# Patient Record
Sex: FEMALE | Race: WHITE | Employment: OTHER | URBAN - METROPOLITAN AREA
[De-identification: names, ages, dates, MRNs, and addresses within clinical notes are randomized per-mention and may not be internally consistent; named-entity substitution may affect disease eponyms.]

---

## 2017-05-23 ENCOUNTER — OFFICE VISIT (OUTPATIENT)
Dept: ORTHOPEDIC SURGERY | Age: 77
End: 2017-05-23

## 2017-05-23 VITALS
BODY MASS INDEX: 26.4 KG/M2 | HEIGHT: 63 IN | WEIGHT: 149 LBS | HEART RATE: 60 BPM | SYSTOLIC BLOOD PRESSURE: 95 MMHG | DIASTOLIC BLOOD PRESSURE: 53 MMHG

## 2017-05-23 DIAGNOSIS — S52.532A CLOSED COLLES' FRACTURE OF LEFT RADIUS, INITIAL ENCOUNTER: Primary | ICD-10-CM

## 2017-05-23 PROCEDURE — 25600 CLTX DST RDL FX/EPHYS SEP WO: CPT | Performed by: ORTHOPAEDIC SURGERY

## 2017-05-23 PROCEDURE — 99203 OFFICE O/P NEW LOW 30 MIN: CPT | Performed by: ORTHOPAEDIC SURGERY

## 2017-05-23 PROCEDURE — L3908 WHO COCK-UP NONMOLDE PRE OTS: HCPCS | Performed by: ORTHOPAEDIC SURGERY

## 2017-07-06 ENCOUNTER — OFFICE VISIT (OUTPATIENT)
Dept: ORTHOPEDIC SURGERY | Age: 77
End: 2017-07-06

## 2017-07-06 VITALS — BODY MASS INDEX: 26.4 KG/M2 | HEIGHT: 63 IN | WEIGHT: 149 LBS | RESPIRATION RATE: 16 BRPM

## 2017-07-06 DIAGNOSIS — S52.532A CLOSED COLLES' FRACTURE OF LEFT RADIUS, INITIAL ENCOUNTER: Primary | ICD-10-CM

## 2017-07-06 PROCEDURE — 99024 POSTOP FOLLOW-UP VISIT: CPT | Performed by: NURSE PRACTITIONER

## 2017-07-06 PROCEDURE — 73110 X-RAY EXAM OF WRIST: CPT | Performed by: NURSE PRACTITIONER

## 2017-09-07 ENCOUNTER — OFFICE VISIT (OUTPATIENT)
Dept: ORTHOPEDIC SURGERY | Age: 77
End: 2017-09-07

## 2017-09-07 VITALS
SYSTOLIC BLOOD PRESSURE: 147 MMHG | WEIGHT: 149 LBS | HEART RATE: 66 BPM | HEIGHT: 63 IN | DIASTOLIC BLOOD PRESSURE: 85 MMHG | BODY MASS INDEX: 26.4 KG/M2 | RESPIRATION RATE: 15 BRPM

## 2017-09-07 DIAGNOSIS — S52.532A CLOSED COLLES' FRACTURE OF LEFT RADIUS, INITIAL ENCOUNTER: Primary | ICD-10-CM

## 2017-09-07 PROCEDURE — 99213 OFFICE O/P EST LOW 20 MIN: CPT | Performed by: ORTHOPAEDIC SURGERY

## 2017-09-07 PROCEDURE — 73110 X-RAY EXAM OF WRIST: CPT | Performed by: ORTHOPAEDIC SURGERY

## 2017-11-01 ENCOUNTER — OFFICE VISIT (OUTPATIENT)
Dept: FAMILY MEDICINE CLINIC | Age: 77
End: 2017-11-01

## 2017-11-01 VITALS
SYSTOLIC BLOOD PRESSURE: 124 MMHG | RESPIRATION RATE: 16 BRPM | BODY MASS INDEX: 26.57 KG/M2 | HEIGHT: 62 IN | TEMPERATURE: 97.5 F | HEART RATE: 68 BPM | WEIGHT: 144.4 LBS | DIASTOLIC BLOOD PRESSURE: 66 MMHG

## 2017-11-01 DIAGNOSIS — M83.1 SENILE OSTEOMALACIA: ICD-10-CM

## 2017-11-01 DIAGNOSIS — E78.2 MIXED HYPERLIPIDEMIA: ICD-10-CM

## 2017-11-01 DIAGNOSIS — G47.00 INSOMNIA, UNSPECIFIED TYPE: Primary | ICD-10-CM

## 2017-11-01 DIAGNOSIS — G25.81 RESTLESS LEG SYNDROME: ICD-10-CM

## 2017-11-01 PROBLEM — M85.80 OSTEOPENIA: Status: ACTIVE | Noted: 2017-06-22

## 2017-11-01 PROBLEM — M48.061 SPINAL STENOSIS OF LUMBAR REGION: Status: ACTIVE | Noted: 2017-06-22

## 2017-11-01 PROBLEM — R20.8 BURNING SENSATION OF MOUTH: Status: ACTIVE | Noted: 2017-06-22

## 2017-11-01 PROCEDURE — 99202 OFFICE O/P NEW SF 15 MIN: CPT | Performed by: FAMILY MEDICINE

## 2017-11-01 PROCEDURE — 1090F PRES/ABSN URINE INCON ASSESS: CPT | Performed by: FAMILY MEDICINE

## 2017-11-01 PROCEDURE — G8427 DOCREV CUR MEDS BY ELIG CLIN: HCPCS | Performed by: FAMILY MEDICINE

## 2017-11-01 PROCEDURE — G8417 CALC BMI ABV UP PARAM F/U: HCPCS | Performed by: FAMILY MEDICINE

## 2017-11-01 PROCEDURE — 1036F TOBACCO NON-USER: CPT | Performed by: FAMILY MEDICINE

## 2017-11-01 PROCEDURE — 1123F ACP DISCUSS/DSCN MKR DOCD: CPT | Performed by: FAMILY MEDICINE

## 2017-11-01 PROCEDURE — G8484 FLU IMMUNIZE NO ADMIN: HCPCS | Performed by: FAMILY MEDICINE

## 2017-11-01 PROCEDURE — 4040F PNEUMOC VAC/ADMIN/RCVD: CPT | Performed by: FAMILY MEDICINE

## 2017-11-01 PROCEDURE — G8400 PT W/DXA NO RESULTS DOC: HCPCS | Performed by: FAMILY MEDICINE

## 2017-11-01 RX ORDER — PRAVASTATIN SODIUM 40 MG
40 TABLET ORAL NIGHTLY
COMMUNITY
Start: 2017-03-02 | End: 2018-07-18 | Stop reason: SDUPTHER

## 2017-11-01 RX ORDER — HYDROCODONE BITARTRATE AND ACETAMINOPHEN 5; 325 MG/1; MG/1
1 TABLET ORAL DAILY PRN
COMMUNITY
Start: 2017-10-03 | End: 2018-05-04 | Stop reason: ALTCHOICE

## 2017-11-01 RX ORDER — OMEGA-3/DHA/EPA/FISH OIL 60 MG-90MG
500 CAPSULE ORAL EVERY MORNING
Status: ON HOLD | COMMUNITY
End: 2019-04-02 | Stop reason: HOSPADM

## 2017-11-01 RX ORDER — CLONAZEPAM 1 MG/1
.5-1 TABLET ORAL NIGHTLY
Qty: 30 TABLET | Refills: 2 | Status: SHIPPED | OUTPATIENT
Start: 2017-11-01 | End: 2018-06-01 | Stop reason: SDUPTHER

## 2017-11-01 RX ORDER — CELECOXIB 200 MG/1
200 CAPSULE ORAL DAILY
COMMUNITY
Start: 2017-08-22 | End: 2018-03-28 | Stop reason: SDUPTHER

## 2017-11-01 RX ORDER — ASCORBIC ACID 500 MG
500 TABLET ORAL DAILY
COMMUNITY

## 2017-11-01 RX ORDER — LANOLIN ALCOHOL/MO/W.PET/CERES
1000 CREAM (GRAM) TOPICAL DAILY
COMMUNITY

## 2017-11-01 RX ORDER — CLONAZEPAM 1 MG/1
.5-1 TABLET ORAL NIGHTLY
COMMUNITY
Start: 2017-10-03 | End: 2017-11-01 | Stop reason: SDUPTHER

## 2017-11-01 ASSESSMENT — ENCOUNTER SYMPTOMS
COUGH: 0
VOMITING: 0
SHORTNESS OF BREATH: 0
ABDOMINAL PAIN: 0
NAUSEA: 0
CONSTIPATION: 0
DIARRHEA: 0

## 2017-11-01 NOTE — PROGRESS NOTES
Father 47     CHOKED AND FELL    Stroke Sister 80     BOTH SISTERS    Other Brother      BOTH  FROM ACCIDENTS     Current Outpatient Prescriptions   Medication Sig Dispense Refill    aspirin 81 MG tablet Take 1 tablet by mouth nightly      celecoxib (CELEBREX) 200 MG capsule Take 200 mg by mouth daily      HYDROcodone-acetaminophen (NORCO) 5-325 MG per tablet Take 1 tablet by mouth daily as needed .  Omega-3 Fatty Acids (FISH OIL) 500 MG CAPS Take 500 mg by mouth every morning      pravastatin (PRAVACHOL) 40 MG tablet Take 40 mg by mouth nightly      vitamin B-12 (CYANOCOBALAMIN) 1000 MCG tablet Take 1,000 mcg by mouth daily      vitamin C (ASCORBIC ACID) 500 MG tablet Take 500 mg by mouth daily      vitamin D (CHOLECALCIFEROL) 1000 UNIT TABS tablet Take 1,000 Units by mouth daily      clonazePAM (KLONOPIN) 1 MG tablet Take 0.5-1 tablets by mouth nightly 30 tablet 2     No current facility-administered medications for this visit. Allergies   Allergen Reactions    Naproxen Other (See Comments)     GI UPSET       Review of Systems   Constitutional: Positive for unexpected weight change. Negative for chills and fever. Respiratory: Negative for cough and shortness of breath. Cardiovascular: Negative for chest pain and palpitations. Gastrointestinal: Negative for abdominal pain, constipation, diarrhea, nausea and vomiting. Genitourinary: Negative for difficulty urinating. Musculoskeletal: Positive for arthralgias and myalgias. Neurological: Negative for dizziness, syncope and light-headedness. Psychiatric/Behavioral: Positive for sleep disturbance. Objective:  /66 (Site: Right Arm, Position: Sitting, Cuff Size: Medium Adult)   Pulse 68   Temp 97.5 °F (36.4 °C) (Oral)   Resp 16   Ht 5' 2.25\" (1.581 m) Comment: NO SHOES  Wt 144 lb 6.4 oz (65.5 kg) Comment: NO SHOES  Breastfeeding?  No   BMI 26.20 kg/m²     Physical Exam   Constitutional: She is oriented to person,

## 2017-11-01 NOTE — PATIENT INSTRUCTIONS
Patient Education        Restless Legs Syndrome: Care Instructions  Your Care Instructions  Restless legs syndrome is a common nervous system problem. People with this syndrome feel a creeping, achy, or unpleasant feeling in the legs and an overpowering urge to move them. It often occurs in the evening and at night and can lead to sleep problems and tiredness. Your doctor may suggest doing a study of your sleep patterns to figure out what is happening when you try to sleep. Many people get relief from symptoms when they get regular exercise, eat well, and avoid caffeine, alcohol, and tobacco.  Follow-up care is a key part of your treatment and safety. Be sure to make and go to all appointments, and call your doctor if you are having problems. It's also a good idea to know your test results and keep a list of the medicines you take. How can you care for yourself at home? · Take your medicines exactly as prescribed. Call your doctor if you think you are having a problem with your medicine. · Try bathing in hot or cold water. Applying a heating pad or ice bag to your legs may also help symptoms. · Stretch and massage your legs before bed or when discomfort begins. · Get some exercise for at least 30 minutes a day on most days of the week. Stop exercising at least 3 hours before bedtime. · Try to plan for situations where you will need to remain seated for long stretches. For example, if you are traveling by car, plan some stops so you can get out and walk around. · Tell your doctor about any medicines you are taking. This includes all over-the-counter, prescription, and herbal medicines. Some medicines, such as antidepressants, antihistamines, and cold and sinus medicines, can make your symptoms worse. · Avoid caffeine products, such as coffee, tea, cola, and chocolate. Caffeine can interrupt your sleep and stimulate you. · Do not smoke. Nicotine can make restless legs worse.  If you need help quitting, talk to your doctor about stop-smoking programs and medicines. These can increase your chances of quitting for good. · Do not drink alcohol late in the evening. Take steps to help you sleep better  · Get plenty of sunlight in the outdoors, particularly later in the afternoon. · Use the evening hours for settling down. Avoid activities that challenge you in the hours before bedtime. · Eat meals at regular times, and do not snack before bedtime. · Keep your bedroom quiet, dark, and cool. Try using a sleep mask and earplugs to help you sleep. · Limit how much you drink at night to reduce your need to get up to urinate. But do not go to bed thirsty. · Run a fan or other steady \"white noise\" during the night if noises wake you up. · [place] the bed for sleeping and sex. Do your reading or TV watching in another room. · Once you are in bed, relax from head to toe, and guide your mind to pleasant thoughts. · Do not stay in bed longer than 8 hours, and try to avoid naps. · If your symptoms usually improve around 4 a.m. to 6 a.m., try going to bed later than usual or allowing extra time for sleeping in to help you get the rest you need. When should you call for help? Watch closely for changes in your health, and be sure to contact your doctor if:  · You are still not getting enough sleep. · Your symptoms become more severe or happen more often. Where can you learn more? Go to https://ControluspejazzyUsarium.Vmedia Research. org and sign in to your [employer] account. Enter X037 in the [facility] box to learn more about \"Restless Legs Syndrome: Care Instructions. \"     If you do not have an account, please click on the \"Sign Up Now\" link. Current as of: October 14, 2016  Content Version: 11.3  © 4068-7637 [employer]. Care instructions adapted under license by [facility] ([facility]).  If you have questions about a medical condition or this instruction, always ask your healthcare professional. [employer] disclaims any warranty or liability for your use of this information.

## 2017-11-10 ENCOUNTER — NURSE ONLY (OUTPATIENT)
Dept: FAMILY MEDICINE CLINIC | Age: 77
End: 2017-11-10

## 2017-11-10 DIAGNOSIS — E78.2 MIXED HYPERLIPIDEMIA: ICD-10-CM

## 2017-11-10 DIAGNOSIS — G25.81 RESTLESS LEG SYNDROME: ICD-10-CM

## 2017-11-10 DIAGNOSIS — G47.00 INSOMNIA, UNSPECIFIED TYPE: ICD-10-CM

## 2017-11-10 DIAGNOSIS — M83.1 SENILE OSTEOMALACIA: ICD-10-CM

## 2017-11-10 LAB
A/G RATIO: 1.4 (ref 1.1–2.2)
ALBUMIN SERPL-MCNC: 3.9 G/DL (ref 3.4–5)
ALP BLD-CCNC: 56 U/L (ref 40–129)
ALT SERPL-CCNC: 14 U/L (ref 10–40)
ANION GAP SERPL CALCULATED.3IONS-SCNC: 11 MMOL/L (ref 3–16)
AST SERPL-CCNC: 26 U/L (ref 15–37)
BASOPHILS ABSOLUTE: 0 K/UL (ref 0–0.2)
BASOPHILS RELATIVE PERCENT: 0.4 %
BILIRUB SERPL-MCNC: 0.5 MG/DL (ref 0–1)
BUN BLDV-MCNC: 19 MG/DL (ref 7–20)
CALCIUM SERPL-MCNC: 9.3 MG/DL (ref 8.3–10.6)
CHLORIDE BLD-SCNC: 102 MMOL/L (ref 99–110)
CHOLESTEROL, TOTAL: 182 MG/DL (ref 0–199)
CO2: 31 MMOL/L (ref 21–32)
CREAT SERPL-MCNC: 1 MG/DL (ref 0.6–1.2)
EOSINOPHILS ABSOLUTE: 0.2 K/UL (ref 0–0.6)
EOSINOPHILS RELATIVE PERCENT: 2.4 %
FOLATE: >20 NG/ML (ref 4.78–24.2)
GFR AFRICAN AMERICAN: >60
GFR NON-AFRICAN AMERICAN: 54
GLOBULIN: 2.7 G/DL
GLUCOSE BLD-MCNC: 96 MG/DL (ref 70–99)
HCT VFR BLD CALC: 47 % (ref 36–48)
HDLC SERPL-MCNC: 51 MG/DL (ref 40–60)
HEMOGLOBIN: 15.3 G/DL (ref 12–16)
LDL CHOLESTEROL CALCULATED: 101 MG/DL
LYMPHOCYTES ABSOLUTE: 3 K/UL (ref 1–5.1)
LYMPHOCYTES RELATIVE PERCENT: 41.7 %
MCH RBC QN AUTO: 30.3 PG (ref 26–34)
MCHC RBC AUTO-ENTMCNC: 32.7 G/DL (ref 31–36)
MCV RBC AUTO: 92.7 FL (ref 80–100)
MONOCYTES ABSOLUTE: 0.4 K/UL (ref 0–1.3)
MONOCYTES RELATIVE PERCENT: 6 %
NEUTROPHILS ABSOLUTE: 3.5 K/UL (ref 1.7–7.7)
NEUTROPHILS RELATIVE PERCENT: 49.5 %
PDW BLD-RTO: 14.5 % (ref 12.4–15.4)
PLATELET # BLD: 179 K/UL (ref 135–450)
PMV BLD AUTO: 8.9 FL (ref 5–10.5)
POTASSIUM SERPL-SCNC: 5.1 MMOL/L (ref 3.5–5.1)
RBC # BLD: 5.07 M/UL (ref 4–5.2)
SODIUM BLD-SCNC: 144 MMOL/L (ref 136–145)
TOTAL PROTEIN: 6.6 G/DL (ref 6.4–8.2)
TRIGL SERPL-MCNC: 151 MG/DL (ref 0–150)
TSH REFLEX: 3.19 UIU/ML (ref 0.27–4.2)
VITAMIN B-12: >2000 PG/ML (ref 211–911)
VITAMIN D 25-HYDROXY: 44.2 NG/ML
VLDLC SERPL CALC-MCNC: 30 MG/DL
WBC # BLD: 7.1 K/UL (ref 4–11)

## 2017-11-10 PROCEDURE — 36415 COLL VENOUS BLD VENIPUNCTURE: CPT | Performed by: FAMILY MEDICINE

## 2017-11-29 ENCOUNTER — OFFICE VISIT (OUTPATIENT)
Dept: FAMILY MEDICINE CLINIC | Age: 77
End: 2017-11-29

## 2017-11-29 VITALS
BODY MASS INDEX: 27.09 KG/M2 | DIASTOLIC BLOOD PRESSURE: 68 MMHG | HEART RATE: 70 BPM | HEIGHT: 62 IN | SYSTOLIC BLOOD PRESSURE: 118 MMHG | WEIGHT: 147.2 LBS | RESPIRATION RATE: 16 BRPM

## 2017-11-29 DIAGNOSIS — H69.83 EUSTACHIAN TUBE DYSFUNCTION, BILATERAL: ICD-10-CM

## 2017-11-29 DIAGNOSIS — H61.21 IMPACTED CERUMEN OF RIGHT EAR: Primary | ICD-10-CM

## 2017-11-29 PROCEDURE — G8400 PT W/DXA NO RESULTS DOC: HCPCS | Performed by: REGISTERED NURSE

## 2017-11-29 PROCEDURE — G8484 FLU IMMUNIZE NO ADMIN: HCPCS | Performed by: REGISTERED NURSE

## 2017-11-29 PROCEDURE — G8427 DOCREV CUR MEDS BY ELIG CLIN: HCPCS | Performed by: REGISTERED NURSE

## 2017-11-29 PROCEDURE — 1090F PRES/ABSN URINE INCON ASSESS: CPT | Performed by: REGISTERED NURSE

## 2017-11-29 PROCEDURE — 4040F PNEUMOC VAC/ADMIN/RCVD: CPT | Performed by: REGISTERED NURSE

## 2017-11-29 PROCEDURE — 99213 OFFICE O/P EST LOW 20 MIN: CPT | Performed by: REGISTERED NURSE

## 2017-11-29 PROCEDURE — G8417 CALC BMI ABV UP PARAM F/U: HCPCS | Performed by: REGISTERED NURSE

## 2017-11-29 PROCEDURE — 1123F ACP DISCUSS/DSCN MKR DOCD: CPT | Performed by: REGISTERED NURSE

## 2017-11-29 PROCEDURE — 1036F TOBACCO NON-USER: CPT | Performed by: REGISTERED NURSE

## 2017-11-29 PROCEDURE — 69209 REMOVE IMPACTED EAR WAX UNI: CPT | Performed by: REGISTERED NURSE

## 2017-11-29 ASSESSMENT — PATIENT HEALTH QUESTIONNAIRE - PHQ9
SUM OF ALL RESPONSES TO PHQ9 QUESTIONS 1 & 2: 0
SUM OF ALL RESPONSES TO PHQ QUESTIONS 1-9: 0
2. FEELING DOWN, DEPRESSED OR HOPELESS: 0
1. LITTLE INTEREST OR PLEASURE IN DOING THINGS: 0

## 2017-11-29 ASSESSMENT — ENCOUNTER SYMPTOMS
SINUS PAIN: 0
SINUS PRESSURE: 0

## 2017-11-29 NOTE — PATIENT INSTRUCTIONS
Patient Education        Earwax Blockage: Care Instructions  Your Care Instructions    Earwax is a natural substance that protects the ear canal. Normally, earwax drains from the ears and does not cause problems. Sometimes earwax builds up and hardens. Earwax blockage (also called cerumen impaction) can cause some loss of hearing and pain. When wax is tightly packed, you will need to have your doctor remove it. Follow-up care is a key part of your treatment and safety. Be sure to make and go to all appointments, and call your doctor if you are having problems. Its also a good idea to know your test results and keep a list of the medicines you take. How can you care for yourself at home? · Do not try to remove earwax with cotton swabs, fingers, or other objects. This can make the blockage worse and damage the eardrum. · If your doctor recommends that you try to remove earwax at home:  ¨ Soften and loosen the earwax with warm mineral oil. You also can try hydrogen peroxide mixed with an equal amount of room temperature water. Place 2 drops of the fluid, warmed to body temperature, in the ear two times a day for up to 5 days. ¨ Once the wax is loose and soft, all that is usually needed to remove it from the ear canal is a gentle, warm shower. Direct the water into the ear, then tip your head to let the earwax drain out. Dry your ear thoroughly with a hair dryer set on low. Hold the dryer several inches from your ear. ¨ If the warm mineral oil and shower do not work, use an over-the-counter wax softener followed by gentle flushing with an ear syringe each night for a week or two. Make sure the flushing solution is body temperature. Cool or hot fluids in the ear can cause dizziness. When should you call for help? Call your doctor now or seek immediate medical care if:  · Pus or blood drains from your ear. · Your ears are ringing or feel full. · You have a loss of hearing.   Watch closely for changes in your health, and be sure to contact your doctor if:  · You have pain or reduced hearing after 1 week of home treatment. · You have any new symptoms, such as nausea or balance problems. Where can you learn more? Go to https://chnancy.CyrusOne. org and sign in to your [employer] account. Enter Z055 in the [facility] box to learn more about \"Earwax Blockage: Care Instructions. \"     If you do not have an account, please click on the \"Sign Up Now\" link. Current as of: March 20, 2017  Content Version: 11.3  © 1758-7389 [employer]. Care instructions adapted under license by [facility] ([facility]). If you have questions about a medical condition or this instruction, always ask your healthcare professional. [address] any warranty or liability for your use of this information. Patient Education        Middle Ear Fluid: Care Instructions  Your Care Instructions    Fluid often builds up inside the ear during a cold or allergies. Usually the fluid drains away, but sometimes a small tube in the ear, called the eustachian tube, stays blocked for months. Symptoms of fluid buildup may include:  · Popping, ringing, or a feeling of fullness or pressure in the ear. · Trouble hearing. · Balance problems and dizziness. In most cases, you can treat yourself at home. Follow-up care is a key part of your treatment and safety. Be sure to make and go to all appointments, and call your doctor if you are having problems. It's also a good idea to know your test results and keep a list of the medicines you take. How can you care for yourself at home? · In most cases, the fluid clears up within a few months without treatment. You may need more tests if the fluid does not clear up after 3 months. · If your doctor prescribed antibiotics, take them as directed. Do not stop taking them just because you feel better. You need to take the full course of antibiotics.   When should you call for help? Call your doctor now or seek immediate medical care if:  · You have symptoms of infection, such as:  ¨ Increased pain, swelling, warmth, or redness. ¨ Pus draining from the area. ¨ A fever. Watch closely for changes in your health, and be sure to contact your doctor if:  · You notice changes in hearing. · You do not get better as expected. Where can you learn more? Go to https://Cell Gate USApepiceweb.[email or web address]. org and sign in to your [email or web address] account. Enter H307 in the [employer] box to learn more about \"Middle Ear Fluid: Care Instructions. \"     If you do not have an account, please click on the \"Sign Up Now\" link. Current as of: July 29, 2016  Content Version: 11.3  © 2353-5988 [employer]ink [employer] Incorporated. Care instructions adapted under license by [facility] ([facility]). If you have questions about a medical condition or this instruction, always ask your healthcare professional. [place] [address] any warranty or liability for your use of this information.

## 2017-11-29 NOTE — PROGRESS NOTES
[facility]  Clinic Note    Date: 11/29/2017                                               Subjective/Objective:     Chief Complaint   Patient presents with    Ear Fullness     LEFT EAR CLOGGED X 2 WKS. USED DROPS AND IT HELPED BREAK UP THE WAX BUT NOW HAVING PROBLEMS AGAIN. HPI Patient presents with left ear clogged and decreased hearing x 2 weeks. Denies fever, chills, sinus congestion, ear drainage or pain. Attempted to treat with ear drops OTC with no relief. No Q tips.  States she has had this issue in the past.         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    Burning sensation of mouth 06/22/2017    Spinal stenosis of lumbar region 06/22/2017    Osteopenia 06/22/2017    Closed Colles' fracture of left radius 05/23/2017    History of nonmelanoma skin cancer 08/03/2016    Mixed hyperlipidemia 10/08/2007       Past Medical History:   Diagnosis Date    Arthritis     Diverticulitis     Hyperlipidemia     Osteoarthritis     Restless legs syndrome     Wrist fracture, left 06/2017    TRIPPED AT SON'S HOUSE       Past Surgical History:   Procedure Laterality Date    CHOLECYSTECTOMY  1996    COLON SURGERY  2007    DIVERTICULITIS, REMOVED DIVERTICULA POCKETS       Nurse Only on 11/10/2017   Component Date Value Ref Range Status    Sodium 11/10/2017 144  136 - 145 mmol/L Final    Potassium 11/10/2017 5.1  3.5 - 5.1 mmol/L Final    Chloride 11/10/2017 102  99 - 110 mmol/L Final    CO2 11/10/2017 31  21 - 32 mmol/L Final    Anion Gap 11/10/2017 11  3 - 16 Final    Glucose 11/10/2017 96  70 - 99 mg/dL Final    BUN 11/10/2017 19  7 - 20 mg/dL Final    CREATININE 11/10/2017 1.0  0.6 - 1.2 mg/dL Final    GFR Non- 11/10/2017 54* >60 Final    Comment: >60 mL/min/1.73m2 EGFR, calc. for ages 25 and older using the  MDRD formula (not corrected for weight), is valid for stable  renal function.  GFR  11/10/2017 >60  >60 Final    Comment: Chronic Kidney Disease: less than 60 ml/min/1.73 sq.m. Kidney Failure: less than 15 ml/min/1.73 sq.m. Results valid for patients 18 years and older.       Calcium 11/10/2017 9.3  8.3 - 10.6 mg/dL Final    Total Protein 11/10/2017 6.6  6.4 - 8.2 g/dL Final    Alb 11/10/2017 3.9  3.4 - 5.0 g/dL Final    Albumin/Globulin Ratio 11/10/2017 1.4  1.1 - 2.2 Final    Total Bilirubin 11/10/2017 0.5  0.0 - 1.0 mg/dL Final    Alkaline Phosphatase 11/10/2017 56  40 - 129 U/L Final    ALT 11/10/2017 14  10 - 40 U/L Final    AST 11/10/2017 26  15 - 37 U/L Final    Globulin 11/10/2017 2.7  g/dL Final    Cholesterol, Total 11/10/2017 182  0 - 199 mg/dL Final    Triglycerides 11/10/2017 151* 0 - 150 mg/dL Final    HDL 11/10/2017 51  40 - 60 mg/dL Final    LDL Calculated 11/10/2017 101* <100 mg/dL Final    VLDL CHOLESTEROL CALCULATED 11/10/2017 30  Not Established mg/dL Final    TSH 11/10/2017 3.19  0.27 - 4.20 uIU/mL Final    WBC 11/10/2017 7.1  4.0 - 11.0 K/uL Final    RBC 11/10/2017 5.07  4.00 - 5.20 M/uL Final    Hemoglobin 11/10/2017 15.3  12.0 - 16.0 g/dL Final    Hematocrit 11/10/2017 47.0  36.0 - 48.0 % Final    MCV 11/10/2017 92.7  80.0 - 100.0 fL Final    MCH 11/10/2017 30.3  26.0 - 34.0 pg Final    MCHC 11/10/2017 32.7  31.0 - 36.0 g/dL Final    RDW 11/10/2017 14.5  12.4 - 15.4 % Final    Platelets 04/78/1970 179  135 - 450 K/uL Final    MPV 11/10/2017 8.9  5.0 - 10.5 fL Final    Neutrophils % 11/10/2017 49.5  % Final    Lymphocytes % 11/10/2017 41.7  % Final    Monocytes % 11/10/2017 6.0  % Final    Eosinophils % 11/10/2017 2.4  % Final    Basophils % 11/10/2017 0.4  % Final    Neutrophils # 11/10/2017 3.5  1.7 - 7.7 K/uL Final    Lymphocytes # 11/10/2017 3.0  1.0 - 5.1 K/uL Final    Monocytes # 11/10/2017 0.4  0.0 - 1.3 K/uL Final    Eosinophils # 11/10/2017 0.2  0.0 - 0.6 K/uL Final    Basophils #

## 2017-12-27 ENCOUNTER — OFFICE VISIT (OUTPATIENT)
Dept: FAMILY MEDICINE CLINIC | Age: 77
End: 2017-12-27

## 2017-12-27 ENCOUNTER — HOSPITAL ENCOUNTER (OUTPATIENT)
Dept: OTHER | Age: 77
Discharge: HOME | End: 2017-12-27
Attending: NURSE PRACTITIONER | Admitting: NURSE PRACTITIONER

## 2017-12-27 VITALS
HEIGHT: 62 IN | WEIGHT: 146.6 LBS | TEMPERATURE: 98.5 F | OXYGEN SATURATION: 98 % | DIASTOLIC BLOOD PRESSURE: 70 MMHG | HEART RATE: 62 BPM | RESPIRATION RATE: 16 BRPM | BODY MASS INDEX: 26.98 KG/M2 | SYSTOLIC BLOOD PRESSURE: 140 MMHG

## 2017-12-27 DIAGNOSIS — M79.605 LEFT LEG PAIN: Primary | ICD-10-CM

## 2017-12-27 DIAGNOSIS — M79.605 LEFT LEG PAIN: ICD-10-CM

## 2017-12-27 PROCEDURE — G8400 PT W/DXA NO RESULTS DOC: HCPCS | Performed by: NURSE PRACTITIONER

## 2017-12-27 PROCEDURE — 1123F ACP DISCUSS/DSCN MKR DOCD: CPT | Performed by: NURSE PRACTITIONER

## 2017-12-27 PROCEDURE — 1090F PRES/ABSN URINE INCON ASSESS: CPT | Performed by: NURSE PRACTITIONER

## 2017-12-27 PROCEDURE — 99213 OFFICE O/P EST LOW 20 MIN: CPT | Performed by: NURSE PRACTITIONER

## 2017-12-27 PROCEDURE — G8484 FLU IMMUNIZE NO ADMIN: HCPCS | Performed by: NURSE PRACTITIONER

## 2017-12-27 PROCEDURE — 1036F TOBACCO NON-USER: CPT | Performed by: NURSE PRACTITIONER

## 2017-12-27 PROCEDURE — G8427 DOCREV CUR MEDS BY ELIG CLIN: HCPCS | Performed by: NURSE PRACTITIONER

## 2017-12-27 PROCEDURE — G8417 CALC BMI ABV UP PARAM F/U: HCPCS | Performed by: NURSE PRACTITIONER

## 2017-12-27 PROCEDURE — 4040F PNEUMOC VAC/ADMIN/RCVD: CPT | Performed by: NURSE PRACTITIONER

## 2017-12-27 RX ORDER — PREDNISONE 10 MG/1
TABLET ORAL
Qty: 20 TABLET | Refills: 0 | Status: SHIPPED | OUTPATIENT
Start: 2017-12-27 | End: 2018-03-20 | Stop reason: ALTCHOICE

## 2018-01-29 ENCOUNTER — OFFICE VISIT (OUTPATIENT)
Dept: FAMILY MEDICINE CLINIC | Age: 78
End: 2018-01-29

## 2018-01-29 VITALS
HEIGHT: 60 IN | BODY MASS INDEX: 29.09 KG/M2 | RESPIRATION RATE: 16 BRPM | TEMPERATURE: 98.1 F | WEIGHT: 148.2 LBS | OXYGEN SATURATION: 96 % | HEART RATE: 68 BPM | SYSTOLIC BLOOD PRESSURE: 140 MMHG | DIASTOLIC BLOOD PRESSURE: 82 MMHG

## 2018-01-29 DIAGNOSIS — M25.572 ARTHRALGIA OF LEFT ANKLE: Primary | ICD-10-CM

## 2018-01-29 DIAGNOSIS — R20.8 BURNING SENSATION OF MOUTH: ICD-10-CM

## 2018-01-29 DIAGNOSIS — E78.5 HYPERLIPIDEMIA, UNSPECIFIED HYPERLIPIDEMIA TYPE: ICD-10-CM

## 2018-01-29 DIAGNOSIS — N39.0 URINARY TRACT INFECTION WITHOUT HEMATURIA, SITE UNSPECIFIED: ICD-10-CM

## 2018-01-29 DIAGNOSIS — Z91.81 AT HIGH RISK FOR FALLS: ICD-10-CM

## 2018-01-29 DIAGNOSIS — N28.9 RENAL INSUFFICIENCY: ICD-10-CM

## 2018-01-29 LAB
ANION GAP SERPL CALCULATED.3IONS-SCNC: 17 MMOL/L (ref 3–16)
BILIRUBIN, POC: ABNORMAL
BLOOD URINE, POC: ABNORMAL
BUN BLDV-MCNC: 22 MG/DL (ref 7–20)
CALCIUM SERPL-MCNC: 10 MG/DL (ref 8.3–10.6)
CHLORIDE BLD-SCNC: 104 MMOL/L (ref 99–110)
CLARITY, POC: ABNORMAL
CO2: 27 MMOL/L (ref 21–32)
COLOR, POC: ABNORMAL
CREAT SERPL-MCNC: 1 MG/DL (ref 0.6–1.2)
FOLATE: >20 NG/ML (ref 4.78–24.2)
GFR AFRICAN AMERICAN: >60
GFR NON-AFRICAN AMERICAN: 54
GLUCOSE BLD-MCNC: 84 MG/DL (ref 70–99)
GLUCOSE URINE, POC: ABNORMAL
HOMOCYSTEINE: 15 UMOL/L (ref 0–10)
KETONES, POC: ABNORMAL
LEUKOCYTE EST, POC: ABNORMAL
NITRITE, POC: ABNORMAL
PARATHYROID HORMONE INTACT: 32.2 PG/ML (ref 14–72)
PH, POC: 7
PHOSPHORUS: 3.9 MG/DL (ref 2.5–4.9)
POTASSIUM SERPL-SCNC: 5.4 MMOL/L (ref 3.5–5.1)
PROTEIN, POC: ABNORMAL
SODIUM BLD-SCNC: 148 MMOL/L (ref 136–145)
SPECIFIC GRAVITY, POC: 1.02
URIC ACID, SERUM: 5.4 MG/DL (ref 2.6–6)
UROBILINOGEN, POC: ABNORMAL
VITAMIN B-12: >2000 PG/ML (ref 211–911)

## 2018-01-29 PROCEDURE — 99214 OFFICE O/P EST MOD 30 MIN: CPT | Performed by: FAMILY MEDICINE

## 2018-01-29 PROCEDURE — 81002 URINALYSIS NONAUTO W/O SCOPE: CPT | Performed by: FAMILY MEDICINE

## 2018-01-29 PROCEDURE — 36415 COLL VENOUS BLD VENIPUNCTURE: CPT | Performed by: FAMILY MEDICINE

## 2018-01-29 RX ORDER — SULFAMETHOXAZOLE AND TRIMETHOPRIM 800; 160 MG/1; MG/1
1 TABLET ORAL 2 TIMES DAILY
Qty: 14 TABLET | Refills: 0 | Status: SHIPPED | OUTPATIENT
Start: 2018-01-29 | End: 2018-02-05

## 2018-01-29 ASSESSMENT — ENCOUNTER SYMPTOMS
SHORTNESS OF BREATH: 0
VOMITING: 0
ABDOMINAL PAIN: 0
DIARRHEA: 0
COUGH: 0

## 2018-01-29 NOTE — PROGRESS NOTES
Neutrophils % 11/10/2017 49.5  % Final    Lymphocytes % 11/10/2017 41.7  % Final    Monocytes % 11/10/2017 6.0  % Final    Eosinophils % 11/10/2017 2.4  % Final    Basophils % 11/10/2017 0.4  % Final    Neutrophils # 11/10/2017 3.5  1.7 - 7.7 K/uL Final    Lymphocytes # 11/10/2017 3.0  1.0 - 5.1 K/uL Final    Monocytes # 11/10/2017 0.4  0.0 - 1.3 K/uL Final    Eosinophils # 11/10/2017 0.2  0.0 - 0.6 K/uL Final    Basophils # 11/10/2017 0.0  0.0 - 0.2 K/uL Final    Vitamin B-12 11/10/2017 >2000* 211 - 911 pg/mL Final    Folate 11/10/2017 >20.00  4.78 - 24.20 ng/mL Final    Comment: Effective 11-15-16 10:00am EST  Please note reference ranges have  changed for Folate.  Vit D, 25-Hydroxy 11/10/2017 44.2  >=30 ng/mL Final    Comment: <=20 ng/mL. ........... Skylar Sean Deficient  21-29 ng/mL. ......... Skylar Sean Insufficient  >=30 ng/mL. ........ Skylar Sean Sufficient       Family History   Problem Relation Age of Onset    Stroke Mother [de-identified]    Alzheimer's Disease Mother 68    Other Father 47     CHOKED AND FELL    Stroke Sister 80     BOTH SISTERS    Other Brother      BOTH  FROM ACCIDENTS     Current Outpatient Prescriptions   Medication Sig Dispense Refill    sulfamethoxazole-trimethoprim (BACTRIM DS) 800-160 MG per tablet Take 1 tablet by mouth 2 times daily for 7 days 14 tablet 0    aspirin 81 MG tablet Take 1 tablet by mouth nightly      HYDROcodone-acetaminophen (NORCO) 5-325 MG per tablet Take 1 tablet by mouth daily as needed .       Omega-3 Fatty Acids (FISH OIL) 500 MG CAPS Take 500 mg by mouth every morning      pravastatin (PRAVACHOL) 40 MG tablet Take 40 mg by mouth nightly      vitamin B-12 (CYANOCOBALAMIN) 1000 MCG tablet Take 1,000 mcg by mouth daily      vitamin C (ASCORBIC ACID) 500 MG tablet Take 500 mg by mouth daily      vitamin D (CHOLECALCIFEROL) 1000 UNIT TABS tablet Take 1,000 Units by mouth daily      clonazePAM (KLONOPIN) 1 MG tablet Take 0.5-1 tablets by mouth nightly 30 tablet 2    predniSONE Assessment/Plan:   Shaquille Puga was seen today for other. Diagnoses and all orders for this visit:    Arthralgia of left ankle  -     Homocysteine, Serum; Future  -     Vitamin B12 & Folate; Future  -     Uric Acid; Future  -     Basic Metabolic Panel; Future  -     Homocysteine, Serum  -     Vitamin B12 & Folate  -     Uric Acid  -     Basic Metabolic Panel    Urinary tract infection without hematuria, site unspecified  -     POCT Urinalysis no Micro  -     Urine Culture; Future  -     sulfamethoxazole-trimethoprim (BACTRIM DS) 800-160 MG per tablet; Take 1 tablet by mouth 2 times daily for 7 days  -     Urine Culture    Renal insufficiency  -     PTH, Intact; Future  -     Phosphorus; Future  -     PTH, Intact  -     Phosphorus    Burning sensation of mouth  -     Vitamin B12 & Folate; Future  -     Vitamin B12 & Folate    Hyperlipidemia, unspecified hyperlipidemia type   -     Homocysteine, Serum; Future  -     Homocysteine, Serum    At high risk for falls    Treat for UTI  Refer to pain if continues to require norco  Refer to rheum or ortho if leg and ankle pain persist    Return if symptoms worsen or fail to improve. [id number] [address]  1/29/2018  5:18 PM    On the basis of positive falls risk screening, assessment and plan is as follows: medications adjusted- added atbx for suspected UTI.

## 2018-01-30 DIAGNOSIS — N28.9 RENAL INSUFFICIENCY: ICD-10-CM

## 2018-01-30 DIAGNOSIS — E78.2 MIXED HYPERLIPIDEMIA: Primary | ICD-10-CM

## 2018-01-31 LAB — URINE CULTURE, ROUTINE: NORMAL

## 2018-02-13 ENCOUNTER — NURSE ONLY (OUTPATIENT)
Dept: FAMILY MEDICINE CLINIC | Age: 78
End: 2018-02-13

## 2018-02-13 DIAGNOSIS — E78.2 MIXED HYPERLIPIDEMIA: ICD-10-CM

## 2018-02-13 DIAGNOSIS — N28.9 RENAL INSUFFICIENCY: ICD-10-CM

## 2018-02-13 LAB
ANION GAP SERPL CALCULATED.3IONS-SCNC: 15 MMOL/L (ref 3–16)
BUN BLDV-MCNC: 20 MG/DL (ref 7–20)
CALCIUM SERPL-MCNC: 9.5 MG/DL (ref 8.3–10.6)
CHLORIDE BLD-SCNC: 102 MMOL/L (ref 99–110)
CO2: 28 MMOL/L (ref 21–32)
CREAT SERPL-MCNC: 0.8 MG/DL (ref 0.6–1.2)
GFR AFRICAN AMERICAN: >60
GFR NON-AFRICAN AMERICAN: >60
GLUCOSE BLD-MCNC: 115 MG/DL (ref 70–99)
HOMOCYSTEINE: 11 UMOL/L (ref 0–10)
POTASSIUM SERPL-SCNC: 4.4 MMOL/L (ref 3.5–5.1)
SODIUM BLD-SCNC: 145 MMOL/L (ref 136–145)

## 2018-02-13 PROCEDURE — 36415 COLL VENOUS BLD VENIPUNCTURE: CPT | Performed by: FAMILY MEDICINE

## 2018-02-15 ENCOUNTER — TELEPHONE (OUTPATIENT)
Dept: FAMILY MEDICINE CLINIC | Age: 78
End: 2018-02-15

## 2018-02-15 DIAGNOSIS — M19.90 ARTHRITIS: Primary | ICD-10-CM

## 2018-02-19 ENCOUNTER — TELEPHONE (OUTPATIENT)
Dept: INTERNAL MEDICINE CLINIC | Age: 78
End: 2018-02-19

## 2018-02-19 NOTE — TELEPHONE ENCOUNTER
Referral from Dr Benny Perez --called pt made NP appt for 3/20 ---consult requested for arthritits. ---records should be in epic.

## 2018-03-20 ENCOUNTER — OFFICE VISIT (OUTPATIENT)
Dept: RHEUMATOLOGY | Age: 78
End: 2018-03-20

## 2018-03-20 VITALS
SYSTOLIC BLOOD PRESSURE: 134 MMHG | WEIGHT: 149 LBS | HEIGHT: 64 IN | DIASTOLIC BLOOD PRESSURE: 82 MMHG | HEART RATE: 64 BPM | BODY MASS INDEX: 25.44 KG/M2

## 2018-03-20 DIAGNOSIS — M15.9 PRIMARY OSTEOARTHRITIS INVOLVING MULTIPLE JOINTS: Primary | ICD-10-CM

## 2018-03-20 DIAGNOSIS — S52.532D CLOSED COLLES' FRACTURE OF LEFT RADIUS WITH ROUTINE HEALING, SUBSEQUENT ENCOUNTER: ICD-10-CM

## 2018-03-20 PROCEDURE — 1090F PRES/ABSN URINE INCON ASSESS: CPT | Performed by: INTERNAL MEDICINE

## 2018-03-20 PROCEDURE — G8482 FLU IMMUNIZE ORDER/ADMIN: HCPCS | Performed by: INTERNAL MEDICINE

## 2018-03-20 PROCEDURE — 1036F TOBACCO NON-USER: CPT | Performed by: INTERNAL MEDICINE

## 2018-03-20 PROCEDURE — 99204 OFFICE O/P NEW MOD 45 MIN: CPT | Performed by: INTERNAL MEDICINE

## 2018-03-20 PROCEDURE — G8417 CALC BMI ABV UP PARAM F/U: HCPCS | Performed by: INTERNAL MEDICINE

## 2018-03-20 PROCEDURE — G8427 DOCREV CUR MEDS BY ELIG CLIN: HCPCS | Performed by: INTERNAL MEDICINE

## 2018-03-20 PROCEDURE — 1123F ACP DISCUSS/DSCN MKR DOCD: CPT | Performed by: INTERNAL MEDICINE

## 2018-03-20 PROCEDURE — G8400 PT W/DXA NO RESULTS DOC: HCPCS | Performed by: INTERNAL MEDICINE

## 2018-03-20 PROCEDURE — 4040F PNEUMOC VAC/ADMIN/RCVD: CPT | Performed by: INTERNAL MEDICINE

## 2018-03-20 NOTE — PROGRESS NOTES
SUBJECTIVE:    Patient ID: Silverio Negro is a 65 y.o. female. She is sent for consultation because of pain in her left ankle and pain right lateral hip. She was previously seen by another rheumatologist for osteoarthritis. She's currently on Celebrex 200 mg a day with some relief of her discomfort. She denies joint swelling. She denies psoriasis. Review of Systems:  Genitourinary: Menopause in her 45s briefly on replacement therapy  Constitutional symptoms: denies weight loss, fevers night sweats  Eyes: denies dry eyes,  Ears, nose, mouth, throat: denies dry mouth  Cardiovascular: denies pleuritic chest pain  Respiratory: denies cough, denies shortness of breath  Gastrointestinal: denies peptic ulcer disease,   Skin: denies psori  Hematologic: denies blood clots  Musculoskeletal: denies Raynaud's, denies joint swelling  History left Colles' fracture  Prior to Visit Medications    Medication Sig Taking? Authorizing Provider   Multiple Vitamins-Minerals (CENTRUM SILVER PO) Take by mouth Yes Historical Provider, MD   aspirin 81 MG tablet Take 1 tablet by mouth nightly Yes Historical Provider, MD   celecoxib (CELEBREX) 200 MG capsule Take 200 mg by mouth daily Yes Historical Provider, MD   HYDROcodone-acetaminophen (NORCO) 5-325 MG per tablet Take 1 tablet by mouth daily as needed .  Yes Historical Provider, MD   Omega-3 Fatty Acids (FISH OIL) 500 MG CAPS Take 500 mg by mouth every morning Yes Historical Provider, MD   pravastatin (PRAVACHOL) 40 MG tablet Take 40 mg by mouth nightly Yes Historical Provider, MD   vitamin B-12 (CYANOCOBALAMIN) 1000 MCG tablet Take 1,000 mcg by mouth daily Yes Historical Provider, MD   vitamin C (ASCORBIC ACID) 500 MG tablet Take 500 mg by mouth daily Yes Historical Provider, MD   vitamin D (CHOLECALCIFEROL) 1000 UNIT TABS tablet Take 1,000 Units by mouth daily Yes Historical Provider, MD   clonazePAM (KLONOPIN) 1 MG tablet Take 0.5-1 tablets by mouth nightly Yes Wyatt Jacobs

## 2018-03-28 ENCOUNTER — TELEPHONE (OUTPATIENT)
Dept: INTERNAL MEDICINE CLINIC | Age: 78
End: 2018-03-28

## 2018-03-28 RX ORDER — CELECOXIB 200 MG/1
200 CAPSULE ORAL DAILY
Qty: 90 CAPSULE | Refills: 0 | Status: SHIPPED | OUTPATIENT
Start: 2018-03-28 | End: 2018-07-18 | Stop reason: SDUPTHER

## 2018-05-01 ENCOUNTER — OFFICE VISIT (OUTPATIENT)
Dept: RHEUMATOLOGY | Age: 78
End: 2018-05-01

## 2018-05-01 ENCOUNTER — HOSPITAL ENCOUNTER (OUTPATIENT)
Dept: OTHER | Age: 78
Discharge: HOME | End: 2018-05-01
Attending: INTERNAL MEDICINE | Admitting: INTERNAL MEDICINE

## 2018-05-01 VITALS
WEIGHT: 149.38 LBS | HEIGHT: 64 IN | SYSTOLIC BLOOD PRESSURE: 134 MMHG | BODY MASS INDEX: 25.5 KG/M2 | DIASTOLIC BLOOD PRESSURE: 80 MMHG | HEART RATE: 72 BPM

## 2018-05-01 DIAGNOSIS — M25.572 CHRONIC PAIN OF LEFT ANKLE: ICD-10-CM

## 2018-05-01 DIAGNOSIS — M15.9 PRIMARY OSTEOARTHRITIS INVOLVING MULTIPLE JOINTS: ICD-10-CM

## 2018-05-01 DIAGNOSIS — M25.572 CHRONIC PAIN OF LEFT ANKLE: Primary | ICD-10-CM

## 2018-05-01 DIAGNOSIS — G89.29 CHRONIC PAIN OF LEFT ANKLE: ICD-10-CM

## 2018-05-01 DIAGNOSIS — G89.29 CHRONIC PAIN OF LEFT ANKLE: Primary | ICD-10-CM

## 2018-05-01 PROCEDURE — 99213 OFFICE O/P EST LOW 20 MIN: CPT | Performed by: INTERNAL MEDICINE

## 2018-05-01 PROCEDURE — G8417 CALC BMI ABV UP PARAM F/U: HCPCS | Performed by: INTERNAL MEDICINE

## 2018-05-01 PROCEDURE — 4040F PNEUMOC VAC/ADMIN/RCVD: CPT | Performed by: INTERNAL MEDICINE

## 2018-05-01 PROCEDURE — G8399 PT W/DXA RESULTS DOCUMENT: HCPCS | Performed by: INTERNAL MEDICINE

## 2018-05-01 PROCEDURE — 1036F TOBACCO NON-USER: CPT | Performed by: INTERNAL MEDICINE

## 2018-05-01 PROCEDURE — 1090F PRES/ABSN URINE INCON ASSESS: CPT | Performed by: INTERNAL MEDICINE

## 2018-05-01 PROCEDURE — 1123F ACP DISCUSS/DSCN MKR DOCD: CPT | Performed by: INTERNAL MEDICINE

## 2018-05-01 PROCEDURE — G8427 DOCREV CUR MEDS BY ELIG CLIN: HCPCS | Performed by: INTERNAL MEDICINE

## 2018-05-02 ENCOUNTER — TELEPHONE (OUTPATIENT)
Dept: RHEUMATOLOGY | Age: 78
End: 2018-05-02

## 2018-05-02 DIAGNOSIS — M25.572 CHRONIC PAIN OF LEFT ANKLE: Primary | ICD-10-CM

## 2018-05-02 DIAGNOSIS — G89.29 CHRONIC PAIN OF LEFT ANKLE: Primary | ICD-10-CM

## 2018-05-04 ENCOUNTER — OFFICE VISIT (OUTPATIENT)
Dept: FAMILY MEDICINE CLINIC | Age: 78
End: 2018-05-04

## 2018-05-04 VITALS
DIASTOLIC BLOOD PRESSURE: 78 MMHG | WEIGHT: 151 LBS | SYSTOLIC BLOOD PRESSURE: 120 MMHG | RESPIRATION RATE: 16 BRPM | HEART RATE: 80 BPM | HEIGHT: 64 IN | BODY MASS INDEX: 25.78 KG/M2

## 2018-05-04 DIAGNOSIS — N30.01 ACUTE CYSTITIS WITH HEMATURIA: Primary | ICD-10-CM

## 2018-05-04 DIAGNOSIS — R35.0 FREQUENT URINATION: ICD-10-CM

## 2018-05-04 LAB
BILIRUBIN, POC: ABNORMAL
BLOOD URINE, POC: ABNORMAL
CLARITY, POC: ABNORMAL
COLOR, POC: ABNORMAL
GLUCOSE URINE, POC: ABNORMAL
KETONES, POC: ABNORMAL
LEUKOCYTE EST, POC: ABNORMAL
NITRITE, POC: ABNORMAL
PH, POC: 6.5
PROTEIN, POC: ABNORMAL
SPECIFIC GRAVITY, POC: 1.01
UROBILINOGEN, POC: ABNORMAL

## 2018-05-04 PROCEDURE — G8427 DOCREV CUR MEDS BY ELIG CLIN: HCPCS | Performed by: REGISTERED NURSE

## 2018-05-04 PROCEDURE — G8417 CALC BMI ABV UP PARAM F/U: HCPCS | Performed by: REGISTERED NURSE

## 2018-05-04 PROCEDURE — G8399 PT W/DXA RESULTS DOCUMENT: HCPCS | Performed by: REGISTERED NURSE

## 2018-05-04 PROCEDURE — 99213 OFFICE O/P EST LOW 20 MIN: CPT | Performed by: REGISTERED NURSE

## 2018-05-04 PROCEDURE — 1090F PRES/ABSN URINE INCON ASSESS: CPT | Performed by: REGISTERED NURSE

## 2018-05-04 PROCEDURE — 81002 URINALYSIS NONAUTO W/O SCOPE: CPT | Performed by: REGISTERED NURSE

## 2018-05-04 PROCEDURE — 4040F PNEUMOC VAC/ADMIN/RCVD: CPT | Performed by: REGISTERED NURSE

## 2018-05-04 PROCEDURE — 1036F TOBACCO NON-USER: CPT | Performed by: REGISTERED NURSE

## 2018-05-04 PROCEDURE — 1123F ACP DISCUSS/DSCN MKR DOCD: CPT | Performed by: REGISTERED NURSE

## 2018-05-04 RX ORDER — SULFAMETHOXAZOLE AND TRIMETHOPRIM 800; 160 MG/1; MG/1
1 TABLET ORAL 2 TIMES DAILY
Qty: 6 TABLET | Refills: 0 | Status: SHIPPED | OUTPATIENT
Start: 2018-05-04 | End: 2018-05-07

## 2018-05-04 ASSESSMENT — ENCOUNTER SYMPTOMS
WHEEZING: 0
COUGH: 0
CHEST TIGHTNESS: 0
SHORTNESS OF BREATH: 0

## 2018-05-06 LAB — URINE CULTURE, ROUTINE: NORMAL

## 2018-05-07 ENCOUNTER — HOSPITAL ENCOUNTER (OUTPATIENT)
Dept: MRI IMAGING | Age: 78
Discharge: HOME | End: 2018-05-07
Attending: INTERNAL MEDICINE | Admitting: INTERNAL MEDICINE

## 2018-05-07 DIAGNOSIS — G89.29 CHRONIC PAIN OF LEFT ANKLE: ICD-10-CM

## 2018-05-07 DIAGNOSIS — M25.572 CHRONIC PAIN OF LEFT ANKLE: ICD-10-CM

## 2018-05-07 DIAGNOSIS — M25.572 PAIN IN LEFT ANKLE: ICD-10-CM

## 2018-05-08 ENCOUNTER — TELEPHONE (OUTPATIENT)
Dept: ORTHOPEDIC SURGERY | Age: 78
End: 2018-05-08

## 2018-05-08 ENCOUNTER — OFFICE VISIT (OUTPATIENT)
Dept: FAMILY MEDICINE CLINIC | Age: 78
End: 2018-05-08

## 2018-05-08 VITALS
SYSTOLIC BLOOD PRESSURE: 120 MMHG | HEIGHT: 64 IN | RESPIRATION RATE: 12 BRPM | DIASTOLIC BLOOD PRESSURE: 80 MMHG | BODY MASS INDEX: 25.51 KG/M2 | WEIGHT: 149.4 LBS | HEART RATE: 78 BPM

## 2018-05-08 DIAGNOSIS — R22.32 ELBOW MASS, LEFT: Primary | ICD-10-CM

## 2018-05-08 PROCEDURE — 1123F ACP DISCUSS/DSCN MKR DOCD: CPT | Performed by: REGISTERED NURSE

## 2018-05-08 PROCEDURE — G8417 CALC BMI ABV UP PARAM F/U: HCPCS | Performed by: REGISTERED NURSE

## 2018-05-08 PROCEDURE — 1036F TOBACCO NON-USER: CPT | Performed by: REGISTERED NURSE

## 2018-05-08 PROCEDURE — 99213 OFFICE O/P EST LOW 20 MIN: CPT | Performed by: REGISTERED NURSE

## 2018-05-08 PROCEDURE — 1090F PRES/ABSN URINE INCON ASSESS: CPT | Performed by: REGISTERED NURSE

## 2018-05-08 PROCEDURE — 4040F PNEUMOC VAC/ADMIN/RCVD: CPT | Performed by: REGISTERED NURSE

## 2018-05-08 PROCEDURE — G8399 PT W/DXA RESULTS DOCUMENT: HCPCS | Performed by: REGISTERED NURSE

## 2018-05-08 PROCEDURE — 20605 DRAIN/INJ JOINT/BURSA W/O US: CPT | Performed by: REGISTERED NURSE

## 2018-05-08 PROCEDURE — G8427 DOCREV CUR MEDS BY ELIG CLIN: HCPCS | Performed by: REGISTERED NURSE

## 2018-05-10 ENCOUNTER — OFFICE VISIT (OUTPATIENT)
Dept: ORTHOPEDIC SURGERY | Age: 78
End: 2018-05-10

## 2018-05-10 VITALS
WEIGHT: 149 LBS | HEIGHT: 63 IN | DIASTOLIC BLOOD PRESSURE: 84 MMHG | SYSTOLIC BLOOD PRESSURE: 156 MMHG | HEART RATE: 64 BPM | BODY MASS INDEX: 26.4 KG/M2

## 2018-05-10 DIAGNOSIS — M25.522 ELBOW PAIN, LEFT: ICD-10-CM

## 2018-05-10 DIAGNOSIS — M19.079 ANKLE ARTHRITIS: Primary | ICD-10-CM

## 2018-05-10 PROCEDURE — 1090F PRES/ABSN URINE INCON ASSESS: CPT | Performed by: ORTHOPAEDIC SURGERY

## 2018-05-10 PROCEDURE — G8399 PT W/DXA RESULTS DOCUMENT: HCPCS | Performed by: ORTHOPAEDIC SURGERY

## 2018-05-10 PROCEDURE — APPNB15 APP NON BILLABLE TIME 0-15 MINS: Performed by: NURSE PRACTITIONER

## 2018-05-10 PROCEDURE — 99214 OFFICE O/P EST MOD 30 MIN: CPT | Performed by: ORTHOPAEDIC SURGERY

## 2018-05-10 PROCEDURE — 1036F TOBACCO NON-USER: CPT | Performed by: ORTHOPAEDIC SURGERY

## 2018-05-10 PROCEDURE — G8428 CUR MEDS NOT DOCUMENT: HCPCS | Performed by: ORTHOPAEDIC SURGERY

## 2018-05-10 PROCEDURE — 1123F ACP DISCUSS/DSCN MKR DOCD: CPT | Performed by: ORTHOPAEDIC SURGERY

## 2018-05-10 PROCEDURE — G8417 CALC BMI ABV UP PARAM F/U: HCPCS | Performed by: ORTHOPAEDIC SURGERY

## 2018-05-10 PROCEDURE — 4040F PNEUMOC VAC/ADMIN/RCVD: CPT | Performed by: ORTHOPAEDIC SURGERY

## 2018-05-10 PROCEDURE — 20605 DRAIN/INJ JOINT/BURSA W/O US: CPT | Performed by: ORTHOPAEDIC SURGERY

## 2018-05-11 ENCOUNTER — NURSE ONLY (OUTPATIENT)
Dept: FAMILY MEDICINE CLINIC | Age: 78
End: 2018-05-11

## 2018-05-11 DIAGNOSIS — N30.01 ACUTE CYSTITIS WITH HEMATURIA: ICD-10-CM

## 2018-05-11 DIAGNOSIS — N39.0 URINARY TRACT INFECTION WITHOUT HEMATURIA, SITE UNSPECIFIED: Primary | ICD-10-CM

## 2018-05-11 DIAGNOSIS — M93.272 OSTEOCHONDRITIS DISSECANS OF LEFT TALUS: Primary | ICD-10-CM

## 2018-05-11 LAB
BILIRUBIN, POC: ABNORMAL
BLOOD URINE, POC: ABNORMAL
CLARITY, POC: ABNORMAL
COLOR, POC: ABNORMAL
GLUCOSE URINE, POC: ABNORMAL
KETONES, POC: ABNORMAL
LEUKOCYTE EST, POC: ABNORMAL
NITRITE, POC: ABNORMAL
PH, POC: 5.5
PROTEIN, POC: ABNORMAL
SPECIFIC GRAVITY, POC: >=1.03
UROBILINOGEN, POC: ABNORMAL

## 2018-05-11 PROCEDURE — 81002 URINALYSIS NONAUTO W/O SCOPE: CPT | Performed by: REGISTERED NURSE

## 2018-05-12 PROBLEM — M19.079 ANKLE ARTHRITIS: Status: ACTIVE | Noted: 2018-05-12

## 2018-06-01 ENCOUNTER — TELEPHONE (OUTPATIENT)
Dept: FAMILY MEDICINE CLINIC | Age: 78
End: 2018-06-01

## 2018-06-01 DIAGNOSIS — G25.81 RESTLESS LEG SYNDROME: Primary | ICD-10-CM

## 2018-06-01 RX ORDER — CLONAZEPAM 1 MG/1
.5-1 TABLET ORAL NIGHTLY
Qty: 30 TABLET | Refills: 0 | Status: SHIPPED | OUTPATIENT
Start: 2018-06-01 | End: 2018-08-07 | Stop reason: SDUPTHER

## 2018-06-20 ENCOUNTER — OFFICE VISIT (OUTPATIENT)
Dept: FAMILY MEDICINE CLINIC | Age: 78
End: 2018-06-20

## 2018-06-20 VITALS
OXYGEN SATURATION: 98 % | HEART RATE: 62 BPM | HEIGHT: 63 IN | SYSTOLIC BLOOD PRESSURE: 128 MMHG | DIASTOLIC BLOOD PRESSURE: 72 MMHG | TEMPERATURE: 98.3 F | BODY MASS INDEX: 26.08 KG/M2 | WEIGHT: 147.2 LBS | RESPIRATION RATE: 16 BRPM

## 2018-06-20 DIAGNOSIS — R05.9 COUGH: ICD-10-CM

## 2018-06-20 DIAGNOSIS — J06.9 VIRAL URI: ICD-10-CM

## 2018-06-20 DIAGNOSIS — J02.9 SORE THROAT: Primary | ICD-10-CM

## 2018-06-20 LAB — S PYO AG THROAT QL: NORMAL

## 2018-06-20 PROCEDURE — 1123F ACP DISCUSS/DSCN MKR DOCD: CPT | Performed by: NURSE PRACTITIONER

## 2018-06-20 PROCEDURE — G8427 DOCREV CUR MEDS BY ELIG CLIN: HCPCS | Performed by: NURSE PRACTITIONER

## 2018-06-20 PROCEDURE — 87880 STREP A ASSAY W/OPTIC: CPT | Performed by: NURSE PRACTITIONER

## 2018-06-20 PROCEDURE — 1090F PRES/ABSN URINE INCON ASSESS: CPT | Performed by: NURSE PRACTITIONER

## 2018-06-20 PROCEDURE — G8399 PT W/DXA RESULTS DOCUMENT: HCPCS | Performed by: NURSE PRACTITIONER

## 2018-06-20 PROCEDURE — 1036F TOBACCO NON-USER: CPT | Performed by: NURSE PRACTITIONER

## 2018-06-20 PROCEDURE — G8417 CALC BMI ABV UP PARAM F/U: HCPCS | Performed by: NURSE PRACTITIONER

## 2018-06-20 PROCEDURE — 99213 OFFICE O/P EST LOW 20 MIN: CPT | Performed by: NURSE PRACTITIONER

## 2018-06-20 PROCEDURE — 4040F PNEUMOC VAC/ADMIN/RCVD: CPT | Performed by: NURSE PRACTITIONER

## 2018-06-20 ASSESSMENT — PATIENT HEALTH QUESTIONNAIRE - PHQ9
1. LITTLE INTEREST OR PLEASURE IN DOING THINGS: 0
SUM OF ALL RESPONSES TO PHQ9 QUESTIONS 1 & 2: 0
2. FEELING DOWN, DEPRESSED OR HOPELESS: 0
SUM OF ALL RESPONSES TO PHQ QUESTIONS 1-9: 0

## 2018-06-20 ASSESSMENT — ENCOUNTER SYMPTOMS: VOICE CHANGE: 0

## 2018-07-18 ENCOUNTER — OFFICE VISIT (OUTPATIENT)
Dept: FAMILY MEDICINE CLINIC | Age: 78
End: 2018-07-18

## 2018-07-18 VITALS
WEIGHT: 147 LBS | HEART RATE: 66 BPM | BODY MASS INDEX: 26.05 KG/M2 | SYSTOLIC BLOOD PRESSURE: 128 MMHG | RESPIRATION RATE: 16 BRPM | HEIGHT: 63 IN | DIASTOLIC BLOOD PRESSURE: 74 MMHG

## 2018-07-18 DIAGNOSIS — Z00.00 WELL ADULT EXAM: ICD-10-CM

## 2018-07-18 DIAGNOSIS — M19.072 ARTHRITIS OF LEFT ANKLE: Primary | ICD-10-CM

## 2018-07-18 DIAGNOSIS — M15.9 PRIMARY OSTEOARTHRITIS INVOLVING MULTIPLE JOINTS: ICD-10-CM

## 2018-07-18 DIAGNOSIS — E78.00 HYPERCHOLESTEREMIA: ICD-10-CM

## 2018-07-18 DIAGNOSIS — Z00.00 ROUTINE GENERAL MEDICAL EXAMINATION AT A HEALTH CARE FACILITY: ICD-10-CM

## 2018-07-18 PROCEDURE — 4040F PNEUMOC VAC/ADMIN/RCVD: CPT | Performed by: FAMILY MEDICINE

## 2018-07-18 PROCEDURE — G0438 PPPS, INITIAL VISIT: HCPCS | Performed by: FAMILY MEDICINE

## 2018-07-18 RX ORDER — CELECOXIB 200 MG/1
200 CAPSULE ORAL DAILY
Qty: 90 CAPSULE | Refills: 3 | Status: SHIPPED | OUTPATIENT
Start: 2018-07-18 | End: 2018-07-18 | Stop reason: SDUPTHER

## 2018-07-18 RX ORDER — TRAMADOL HYDROCHLORIDE 50 MG/1
50 TABLET ORAL EVERY 6 HOURS PRN
Qty: 60 TABLET | Refills: 0 | Status: SHIPPED | OUTPATIENT
Start: 2018-07-18 | End: 2018-08-17

## 2018-07-18 RX ORDER — CELECOXIB 200 MG/1
CAPSULE ORAL
Qty: 90 CAPSULE | Refills: 0 | Status: ON HOLD | OUTPATIENT
Start: 2018-07-18 | End: 2019-04-02 | Stop reason: HOSPADM

## 2018-07-18 RX ORDER — PRAVASTATIN SODIUM 40 MG
40 TABLET ORAL NIGHTLY
Qty: 90 TABLET | Refills: 3 | Status: SHIPPED | OUTPATIENT
Start: 2018-07-18 | End: 2018-07-27 | Stop reason: SDUPTHER

## 2018-07-18 ASSESSMENT — ANXIETY QUESTIONNAIRES: GAD7 TOTAL SCORE: 0

## 2018-07-18 ASSESSMENT — LIFESTYLE VARIABLES: HOW OFTEN DO YOU HAVE A DRINK CONTAINING ALCOHOL: 0

## 2018-07-18 ASSESSMENT — PATIENT HEALTH QUESTIONNAIRE - PHQ9: SUM OF ALL RESPONSES TO PHQ QUESTIONS 1-9: 0

## 2018-07-18 NOTE — PATIENT INSTRUCTIONS
Personalized Preventive Plan for Naomy Gentile - 7/18/2018  Medicare offers a range of preventive health benefits. Some of the tests and screenings are paid in full while other may be subject to a deductible, co-insurance, and/or copay. Some of these benefits include a comprehensive review of your medical history including lifestyle, illnesses that may run in your family, and various assessments and screenings as appropriate. After reviewing your medical record and screening and assessments performed today your provider may have ordered immunizations, labs, imaging, and/or referrals for you. A list of these orders (if applicable) as well as your Preventive Care list are included within your After Visit Summary for your review. Other Preventive Recommendations:    · A preventive eye exam performed by an eye specialist is recommended every 1-2 years to screen for glaucoma; cataracts, macular degeneration, and other eye disorders. · A preventive dental visit is recommended every 6 months. · Try to get at least 150 minutes of exercise per week or 10,000 steps per day on a pedometer . · Order or download the FREE \"Exercise & Physical Activity: Your Everyday Guide\" from [employer]. Call 5-736.978.9337 or search [employer] online. · You need 2455-1715 mg of calcium and 7898-0867 IU of vitamin D per day. It is possible to meet your calcium requirement with diet alone, but a vitamin D supplement is usually necessary to meet this goal.  · When exposed to the sun, use a sunscreen that protects against both UVA and UVB radiation with an SPF of 30 or greater. Reapply every 2 to 3 hours or after sweating, drying off with a towel, or swimming. · Always wear a seat belt when traveling in a car. Always wear a helmet when riding a bicycle or motorcycle.

## 2018-07-18 NOTE — PROGRESS NOTES
History:   Diagnosis Date    Arthritis     Diverticulitis     Hyperlipidemia     Osteoarthritis     Restless legs syndrome     Wrist fracture, left 2017    TRIPPED AT SON'S HOUSE     Past Surgical History:   Procedure Laterality Date    CHOLECYSTECTOMY      COLON SURGERY      DIVERTICULITIS, REMOVED DIVERTICULA POCKETS     Family History   Problem Relation Age of Onset    Stroke Mother [de-identified]    Alzheimer's Disease Mother 68    Other Father 47        [address]    Stroke Sister 80        BOTH SISTERS    Other Brother         BOTH  FROM ACCIDENTS     occ pain in left breast - not sure related to exertion    pe - nad   cv rrr  Lungs cta  Minimal edema ankles  Fairly normal gait  abd soft nd/nt    CareTeam (Including outside providers/suppliers regularly involved in providing care):   Patient Care Team:  MICAELA Escoto CNP as PCP - General (Family Nurse Practitioner)    Wt Readings from Last 3 Encounters:   18 147 lb (66.7 kg)   18 147 lb 3.2 oz (66.8 kg)   05/10/18 149 lb (67.6 kg)     Vitals:    18 1005   BP: 128/74   Site: Left Arm   Position: Sitting   Cuff Size: Medium Adult   Pulse: 66   Resp: 16   Weight: 147 lb (66.7 kg)   Height: 5' 3\" (1.6 m)       Patient's complete Health Risk Assessment and screening values have been reviewed and are found in Flowsheets. The following problems were reviewed today and where indicated follow up appointments were made and/or referrals ordered.     Positive Risk Factor Screenings with Interventions:     General Health:  General  In general, how would you say your health is?: Good  In the past 7 days, have you experienced any of the following?: (!) New or Increased Pain  Do you get the social and emotional support that you need?: Yes  Do you have a Living Will?: Yes  General Health Risk Interventions:  · pain fairly stable - on celebrex - tolerating well    Health Habits/Nutrition:  Health Habits/Nutrition  Do you exercise

## 2018-07-27 RX ORDER — PRAVASTATIN SODIUM 40 MG
40 TABLET ORAL NIGHTLY
Qty: 90 TABLET | Refills: 3 | Status: SHIPPED | OUTPATIENT
Start: 2018-07-27 | End: 2019-07-22 | Stop reason: SDUPTHER

## 2018-08-07 ENCOUNTER — OFFICE VISIT (OUTPATIENT)
Dept: ORTHOPEDIC SURGERY | Age: 78
End: 2018-08-07

## 2018-08-07 VITALS
WEIGHT: 147 LBS | BODY MASS INDEX: 26.05 KG/M2 | DIASTOLIC BLOOD PRESSURE: 85 MMHG | HEART RATE: 55 BPM | SYSTOLIC BLOOD PRESSURE: 164 MMHG | HEIGHT: 63 IN

## 2018-08-07 DIAGNOSIS — G25.81 RESTLESS LEG SYNDROME: ICD-10-CM

## 2018-08-07 DIAGNOSIS — M25.572 ACUTE LEFT ANKLE PAIN: Primary | ICD-10-CM

## 2018-08-07 PROCEDURE — 99214 OFFICE O/P EST MOD 30 MIN: CPT | Performed by: ORTHOPAEDIC SURGERY

## 2018-08-07 PROCEDURE — 1090F PRES/ABSN URINE INCON ASSESS: CPT | Performed by: ORTHOPAEDIC SURGERY

## 2018-08-07 PROCEDURE — G8417 CALC BMI ABV UP PARAM F/U: HCPCS | Performed by: ORTHOPAEDIC SURGERY

## 2018-08-07 PROCEDURE — 1101F PT FALLS ASSESS-DOCD LE1/YR: CPT | Performed by: ORTHOPAEDIC SURGERY

## 2018-08-07 PROCEDURE — 4040F PNEUMOC VAC/ADMIN/RCVD: CPT | Performed by: ORTHOPAEDIC SURGERY

## 2018-08-07 PROCEDURE — G8399 PT W/DXA RESULTS DOCUMENT: HCPCS | Performed by: ORTHOPAEDIC SURGERY

## 2018-08-07 PROCEDURE — 1123F ACP DISCUSS/DSCN MKR DOCD: CPT | Performed by: ORTHOPAEDIC SURGERY

## 2018-08-07 PROCEDURE — 1036F TOBACCO NON-USER: CPT | Performed by: ORTHOPAEDIC SURGERY

## 2018-08-07 PROCEDURE — G8427 DOCREV CUR MEDS BY ELIG CLIN: HCPCS | Performed by: ORTHOPAEDIC SURGERY

## 2018-08-07 RX ORDER — CLONAZEPAM 1 MG/1
TABLET ORAL
Qty: 30 TABLET | Refills: 0 | Status: SHIPPED | OUTPATIENT
Start: 2018-08-07 | End: 2018-09-24 | Stop reason: SDUPTHER

## 2018-08-08 RX ORDER — MELOXICAM 7.5 MG/1
7.5 TABLET ORAL DAILY
Qty: 30 TABLET | Refills: 3 | Status: SHIPPED | OUTPATIENT
Start: 2018-08-08 | End: 2018-11-27

## 2018-08-14 NOTE — PROGRESS NOTES
Former User     Quit date: 2017    Alcohol use No    Drug use: No    Sexual activity: Not on file     Other Topics Concern    Not on file     Social History Narrative    No narrative on file       Family History   Problem Relation Age of Onset    Stroke Mother [de-identified]    Alzheimer's Disease Mother 68    Other Father 47        [address] Stroke Sister 80        BOTH SISTERS    Other Brother         BOTH  FROM ACCIDENTS       Current Outpatient Prescriptions on File Prior to Visit   Medication Sig Dispense Refill    pravastatin (PRAVACHOL) 40 MG tablet Take 1 tablet by mouth nightly 90 tablet 3    traMADol (ULTRAM) 50 MG tablet Take 1 tablet by mouth every 6 hours as needed for Pain for up to 30 days. . 60 tablet 0    celecoxib (CELEBREX) 200 MG capsule TAKE 1 CAPSULE EVERY DAY 90 capsule 0    Multiple Vitamins-Minerals (CENTRUM SILVER PO) Take by mouth      aspirin 81 MG tablet Take 1 tablet by mouth nightly      Omega-3 Fatty Acids (FISH OIL) 500 MG CAPS Take 500 mg by mouth every morning      vitamin B-12 (CYANOCOBALAMIN) 1000 MCG tablet Take 1,000 mcg by mouth daily      vitamin C (ASCORBIC ACID) 500 MG tablet Take 500 mg by mouth daily      vitamin D (CHOLECALCIFEROL) 1000 UNIT TABS tablet Take 1,000 Units by mouth daily       No current facility-administered medications on file prior to visit. Pertinent items are noted in HPI  Review of systems reviewed from Patient History Form dated on 2017 and available in the patient's chart under the Media tab. No change       PHYSICAL EXAMINATION:  Ms. Victor Manuel Ortega is a very pleasant 65 y.o.  female who presents today in no acute distress, awake, alert, and oriented. She is well dressed, nourished and  groomed. Patient with normal affect. Height is  5' 3\" (1.6 m), weight is 147 lb (66.7 kg), Body mass index is 26.04 kg/m². Resting respiratory rate is 16.      Examination of the gait, showed that the patient walks heel-toe with minimal limp.  Examination of left ankle showing decrease ROM compare to the other side. She has intact sensation and good pedal pulses.  She has good strength in all four planes, including eversion, and has moderate tenderness on deep palpation over the medial ankle joint line, with mild swelling compared to the other side.  The ankles are stable to drawer test bilaterally, equally.    Examination of both elbows showing a good range of motion. She has intact sensation and good radial pulses bilateral.  She has good hand  strength bilateral, and has mild to no tenderness on deep palpation over the olecranon bursa. There is mild soft tissue swelling over the left olecranon bursa. No erythema or warmth. The elbows are stable. Biceps reflex 1+ bilaterally. IMAGING:  [facility] were reviewed,  3 views of the left ankle taken 5/1/2018, and showed no acute fracture. X-rays were taken in the office 5/10/2018, 2 views of the left elbow, and showed no fracture. Posterior soft tissue swelling. 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 IMPRESSION:   1-Left ankle pain/OCD lesion talus. 2-Left posterior elbow pain/Olecranon bursitis. 3-Left anterior leg pain/ shin splints. PLAN: For the ankle: I discussed with the patient the treatment options. We recommended continue aggressive stretching exercises of the calf which was taught to the patient today. She will take NSAIDS Mobic. For the elbow:I discussed with the patient the treatment options including both surgical and non-surgical treatment. We recommended stretching exercises of the forearm and avoid pressure on the olecranon. She will take NSAIDS as needed. F/u in 6 weeks, PT if needed.        Leona Nguyen MD

## 2018-09-18 ENCOUNTER — OFFICE VISIT (OUTPATIENT)
Dept: ORTHOPEDIC SURGERY | Age: 78
End: 2018-09-18

## 2018-09-18 VITALS
HEIGHT: 63 IN | BODY MASS INDEX: 26.05 KG/M2 | SYSTOLIC BLOOD PRESSURE: 155 MMHG | DIASTOLIC BLOOD PRESSURE: 87 MMHG | RESPIRATION RATE: 16 BRPM | WEIGHT: 147 LBS | HEART RATE: 56 BPM

## 2018-09-18 DIAGNOSIS — M72.2 PLANTAR FASCIITIS, BILATERAL: Primary | ICD-10-CM

## 2018-09-18 PROCEDURE — G8427 DOCREV CUR MEDS BY ELIG CLIN: HCPCS | Performed by: ORTHOPAEDIC SURGERY

## 2018-09-18 PROCEDURE — 1123F ACP DISCUSS/DSCN MKR DOCD: CPT | Performed by: ORTHOPAEDIC SURGERY

## 2018-09-18 PROCEDURE — 1090F PRES/ABSN URINE INCON ASSESS: CPT | Performed by: ORTHOPAEDIC SURGERY

## 2018-09-18 PROCEDURE — 99214 OFFICE O/P EST MOD 30 MIN: CPT | Performed by: ORTHOPAEDIC SURGERY

## 2018-09-18 PROCEDURE — 1036F TOBACCO NON-USER: CPT | Performed by: ORTHOPAEDIC SURGERY

## 2018-09-18 PROCEDURE — G8417 CALC BMI ABV UP PARAM F/U: HCPCS | Performed by: ORTHOPAEDIC SURGERY

## 2018-09-18 PROCEDURE — 4040F PNEUMOC VAC/ADMIN/RCVD: CPT | Performed by: ORTHOPAEDIC SURGERY

## 2018-09-18 PROCEDURE — G8399 PT W/DXA RESULTS DOCUMENT: HCPCS | Performed by: ORTHOPAEDIC SURGERY

## 2018-09-18 PROCEDURE — 1101F PT FALLS ASSESS-DOCD LE1/YR: CPT | Performed by: ORTHOPAEDIC SURGERY

## 2018-09-24 DIAGNOSIS — G25.81 RESTLESS LEG SYNDROME: ICD-10-CM

## 2018-09-25 NOTE — PROGRESS NOTES
CHIEF COMPLAINT:   1-Left ankle pain/OCD lesion talus. 2-Left posterior elbow pain/Olecranon bursitis. 3-Left anterior leg pain/ shin splints. HISTORY:  Ms. Lilian Cottrell 77 y.o.  female presents today for f/u evaluation of left anterior tibia pain which started spring 2018 and has been worsening. She had left ankle cortisone injection on 5/10/2018 with no improvement. She is still complaining of achy pain anterior left ankle and leg 8/10 end of day. Alleviating factors:  OTC NSAIDS and rest. Pain is increase with standing and walking and shoe wear. Pain is sharp with first few steps, dull achy pain by the end of the day. No radiation and no numbness and tingling sensation. No other complaint. There is no history of ankle injury that she can remember. She also presents today for evaluation of left posterior elbow pain which started one week ago. She sees Dr. Kristie Garces for arthritis. Dr. Kristie Garces drained of left olecranon bursitis May 2018. The pain has improved since it was drained. She is complaining of dull pain that is improving. Pain is increase with moving the elbow. Pain is dull achy pain by the end of the day. No radiation to the forearm and no numbness and tingling sensation. No other complaint. No h/o trauma or gout. Past Medical History:   Diagnosis Date    Arthritis     Diverticulitis     Hyperlipidemia     Osteoarthritis     Restless legs syndrome     Wrist fracture, left 06/2017    TRIPPED AT [facility] HOUSE       Past Surgical History:   Procedure Laterality Date    CHOLECYSTECTOMY  1996    COLON SURGERY  2007    DIVERTICULITIS, REMOVED DIVERTICULA POCKETS       Social History     Social History    Marital status:      Spouse name: N/A    Number of children: N/A    Years of education: N/A     Occupational History    Not on file.      Social History Main Topics    Smoking status: Former Smoker     Packs/day: 0.25     Years: 50.00     Types: Cigarettes     Quit date: 2016    Smokeless tobacco: Former User     Quit date: 2017    Alcohol use No    Drug use: No    Sexual activity: Not on file     Other Topics Concern    Not on file     Social History Narrative    No narrative on file       Family History   Problem Relation Age of Onset    Stroke Mother [de-identified]    Alzheimer's Disease Mother 68    Other Father 47        [address] Stroke Sister 80        BOTH SISTERS    Other Brother         BOTH  FROM ACCIDENTS       Current Outpatient Prescriptions on File Prior to Visit   Medication Sig Dispense Refill    meloxicam (MOBIC) 7.5 MG tablet Take 1 tablet by mouth daily 30 tablet 3    clonazePAM (KLONOPIN) 1 MG tablet TAKE ONE-HALF TO ONE TABLET BY MOUTH TAKE TABLET BY MOUTH ONCE NIGHTLY 30 tablet 0    pravastatin (PRAVACHOL) 40 MG tablet Take 1 tablet by mouth nightly 90 tablet 3    celecoxib (CELEBREX) 200 MG capsule TAKE 1 CAPSULE EVERY DAY 90 capsule 0    Multiple Vitamins-Minerals (CENTRUM SILVER PO) Take by mouth      aspirin 81 MG tablet Take 1 tablet by mouth nightly      Omega-3 Fatty Acids (FISH OIL) 500 MG CAPS Take 500 mg by mouth every morning      vitamin B-12 (CYANOCOBALAMIN) 1000 MCG tablet Take 1,000 mcg by mouth daily      vitamin C (ASCORBIC ACID) 500 MG tablet Take 500 mg by mouth daily      vitamin D (CHOLECALCIFEROL) 1000 UNIT TABS tablet Take 1,000 Units by mouth daily       No current facility-administered medications on file prior to visit. Pertinent items are noted in HPI  Review of systems reviewed from Patient History Form dated on 2017 and available in the patient's chart under the Media tab. No change. PHYSICAL EXAMINATION:  Ms. Ridge Nagel is a very pleasant 77 y.o.  female who presents today in no acute distress, awake, alert, and oriented. She is well dressed, nourished and  groomed. Patient with normal affect.   Height is  5' 3\" (1.6 m), weight is 147 lb (66.7 kg), Body mass index is 26.04 kg/m². Resting respiratory rate is 16. Examination of the gait, showed that the patient walks heel-toe with minimal limp.  Examination of left ankle showing decrease ROM compare to the other side. She has intact sensation and good pedal pulses.  She has good strength in all four planes, including eversion, and has moderate tenderness on deep palpation over the medial ankle joint line, with mild swelling compared to the other side.  The ankles are stable to drawer test bilaterally, equally.    Examination of both elbows showing a good range of motion. She has intact sensation and good radial pulses bilateral.  She has good hand  strength bilateral, and has mild to no tenderness on deep palpation over the olecranon bursa. There is mild soft tissue swelling over the left olecranon bursa. No erythema or warmth. The elbows are stable. Biceps reflex 1+ bilaterally. IMAGING:  [facility] were reviewed,  3 views of the left ankle taken 5/1/2018, and showed no acute fracture. X-rays were taken in the office 5/10/2018, 2 views of the left elbow, and showed no fracture. Posterior soft tissue swelling. 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 IMPRESSION:   1-Left ankle pain/OCD lesion talus. 2-Left posterior elbow pain/Olecranon bursitis. 3-Left anterior leg pain/ shin splints. PLAN:  For the elbow:I discussed with the patient the treatment options including both surgical and non-surgical treatment. We recommended stretching exercises of the forearm and avoid pressure on the olecranon. She will take NSAIDS as needed. For the ankle: I discussed with the patient the treatment options. We recommended continue aggressive stretching exercises of the calf which was taught to the patient today. She will take NSAIDS Mobic. She would like to try Orthosis, and I believe she may benefit from Orthosis, Rx given to her.  I

## 2018-09-26 RX ORDER — CLONAZEPAM 1 MG/1
TABLET ORAL
Qty: 30 TABLET | Refills: 1 | Status: SHIPPED | OUTPATIENT
Start: 2018-09-26 | End: 2019-01-30 | Stop reason: SDUPTHER

## 2018-10-11 ENCOUNTER — OFFICE VISIT (OUTPATIENT)
Dept: ENT CLINIC | Age: 78
End: 2018-10-11

## 2018-10-11 VITALS — HEART RATE: 64 BPM | DIASTOLIC BLOOD PRESSURE: 70 MMHG | OXYGEN SATURATION: 96 % | SYSTOLIC BLOOD PRESSURE: 110 MMHG

## 2018-10-11 DIAGNOSIS — H65.02 ACUTE SEROUS OTITIS MEDIA OF LEFT EAR, RECURRENCE NOT SPECIFIED: Primary | ICD-10-CM

## 2018-10-11 PROCEDURE — 99203 OFFICE O/P NEW LOW 30 MIN: CPT | Performed by: OTOLARYNGOLOGY

## 2018-10-11 RX ORDER — PREDNISONE 10 MG/1
TABLET ORAL
Qty: 25 TABLET | Refills: 0 | Status: SHIPPED | OUTPATIENT
Start: 2018-10-11 | End: 2018-11-27 | Stop reason: ALTCHOICE

## 2018-10-11 RX ORDER — DOXYCYCLINE 100 MG/1
100 TABLET ORAL 2 TIMES DAILY
Qty: 20 TABLET | Refills: 0 | Status: SHIPPED | OUTPATIENT
Start: 2018-10-11 | End: 2019-01-30 | Stop reason: SDUPTHER

## 2018-10-11 ASSESSMENT — ENCOUNTER SYMPTOMS
SORE THROAT: 0
RESPIRATORY NEGATIVE: 1
SINUS PAIN: 0
ALLERGIC/IMMUNOLOGIC NEGATIVE: 1
RHINORRHEA: 0
TROUBLE SWALLOWING: 0
EYES NEGATIVE: 1
FACIAL SWELLING: 0
VOICE CHANGE: 0
SINUS PRESSURE: 0

## 2018-11-27 ENCOUNTER — OFFICE VISIT (OUTPATIENT)
Dept: FAMILY MEDICINE CLINIC | Age: 78
End: 2018-11-27
Payer: MEDICARE

## 2018-11-27 VITALS
HEIGHT: 63 IN | DIASTOLIC BLOOD PRESSURE: 84 MMHG | RESPIRATION RATE: 16 BRPM | BODY MASS INDEX: 26.86 KG/M2 | HEART RATE: 68 BPM | WEIGHT: 151.6 LBS | TEMPERATURE: 98.5 F | SYSTOLIC BLOOD PRESSURE: 126 MMHG | OXYGEN SATURATION: 97 %

## 2018-11-27 DIAGNOSIS — R30.0 DYSURIA: ICD-10-CM

## 2018-11-27 DIAGNOSIS — R35.0 URINARY FREQUENCY: Primary | ICD-10-CM

## 2018-11-27 LAB
BILIRUBIN, POC: NEGATIVE
BLOOD URINE, POC: ABNORMAL
CLARITY, POC: CLEAR
COLOR, POC: YELLOW
GLUCOSE URINE, POC: NEGATIVE
KETONES, POC: NEGATIVE
LEUKOCYTE EST, POC: ABNORMAL
NITRITE, POC: NEGATIVE
PH, POC: 6
PROTEIN, POC: ABNORMAL
SPECIFIC GRAVITY, POC: >=1.03
UROBILINOGEN, POC: 0.2

## 2018-11-27 PROCEDURE — 1123F ACP DISCUSS/DSCN MKR DOCD: CPT | Performed by: NURSE PRACTITIONER

## 2018-11-27 PROCEDURE — 81002 URINALYSIS NONAUTO W/O SCOPE: CPT | Performed by: NURSE PRACTITIONER

## 2018-11-27 PROCEDURE — G8399 PT W/DXA RESULTS DOCUMENT: HCPCS | Performed by: NURSE PRACTITIONER

## 2018-11-27 PROCEDURE — 1036F TOBACCO NON-USER: CPT | Performed by: NURSE PRACTITIONER

## 2018-11-27 PROCEDURE — G8482 FLU IMMUNIZE ORDER/ADMIN: HCPCS | Performed by: NURSE PRACTITIONER

## 2018-11-27 PROCEDURE — G8417 CALC BMI ABV UP PARAM F/U: HCPCS | Performed by: NURSE PRACTITIONER

## 2018-11-27 PROCEDURE — 99213 OFFICE O/P EST LOW 20 MIN: CPT | Performed by: NURSE PRACTITIONER

## 2018-11-27 PROCEDURE — 4040F PNEUMOC VAC/ADMIN/RCVD: CPT | Performed by: NURSE PRACTITIONER

## 2018-11-27 PROCEDURE — 1101F PT FALLS ASSESS-DOCD LE1/YR: CPT | Performed by: NURSE PRACTITIONER

## 2018-11-27 PROCEDURE — 1090F PRES/ABSN URINE INCON ASSESS: CPT | Performed by: NURSE PRACTITIONER

## 2018-11-27 PROCEDURE — G8427 DOCREV CUR MEDS BY ELIG CLIN: HCPCS | Performed by: NURSE PRACTITIONER

## 2018-11-27 RX ORDER — CIPROFLOXACIN 250 MG/1
250 TABLET, FILM COATED ORAL 2 TIMES DAILY
Qty: 14 TABLET | Refills: 0 | Status: SHIPPED | OUTPATIENT
Start: 2018-11-27 | End: 2018-12-04

## 2018-11-27 ASSESSMENT — PATIENT HEALTH QUESTIONNAIRE - PHQ9
2. FEELING DOWN, DEPRESSED OR HOPELESS: 0
1. LITTLE INTEREST OR PLEASURE IN DOING THINGS: 0
SUM OF ALL RESPONSES TO PHQ9 QUESTIONS 1 & 2: 0
SUM OF ALL RESPONSES TO PHQ QUESTIONS 1-9: 0
SUM OF ALL RESPONSES TO PHQ QUESTIONS 1-9: 0

## 2018-11-29 LAB — URINE CULTURE, ROUTINE: NORMAL

## 2018-12-10 ENCOUNTER — TELEPHONE (OUTPATIENT)
Dept: FAMILY MEDICINE CLINIC | Age: 78
End: 2018-12-10

## 2018-12-10 DIAGNOSIS — L98.9 FACE LESION: Primary | ICD-10-CM

## 2018-12-10 NOTE — TELEPHONE ENCOUNTER
Pt is calling to get a referral to a dermatologist.  Pt has a thing on her nose and it gets read and itches sometime. So she wants to see a derm. Pt wants one close to here.       Pt just wants one from a doc to a dco

## 2018-12-27 ENCOUNTER — OFFICE VISIT (OUTPATIENT)
Dept: ORTHOPEDIC SURGERY | Age: 78
End: 2018-12-27
Payer: MEDICARE

## 2018-12-27 VITALS
DIASTOLIC BLOOD PRESSURE: 84 MMHG | HEIGHT: 63 IN | WEIGHT: 152.4 LBS | SYSTOLIC BLOOD PRESSURE: 146 MMHG | BODY MASS INDEX: 27 KG/M2 | HEART RATE: 63 BPM

## 2018-12-27 DIAGNOSIS — M17.11 PRIMARY OSTEOARTHRITIS OF RIGHT KNEE: ICD-10-CM

## 2018-12-27 DIAGNOSIS — M25.561 RIGHT KNEE PAIN, UNSPECIFIED CHRONICITY: Primary | ICD-10-CM

## 2018-12-27 PROCEDURE — 99214 OFFICE O/P EST MOD 30 MIN: CPT | Performed by: ORTHOPAEDIC SURGERY

## 2018-12-27 PROCEDURE — 4004F PT TOBACCO SCREEN RCVD TLK: CPT | Performed by: ORTHOPAEDIC SURGERY

## 2018-12-27 PROCEDURE — 1090F PRES/ABSN URINE INCON ASSESS: CPT | Performed by: ORTHOPAEDIC SURGERY

## 2018-12-27 PROCEDURE — 4040F PNEUMOC VAC/ADMIN/RCVD: CPT | Performed by: ORTHOPAEDIC SURGERY

## 2018-12-27 PROCEDURE — 1101F PT FALLS ASSESS-DOCD LE1/YR: CPT | Performed by: ORTHOPAEDIC SURGERY

## 2018-12-27 PROCEDURE — G8417 CALC BMI ABV UP PARAM F/U: HCPCS | Performed by: ORTHOPAEDIC SURGERY

## 2018-12-27 PROCEDURE — 20610 DRAIN/INJ JOINT/BURSA W/O US: CPT | Performed by: ORTHOPAEDIC SURGERY

## 2018-12-27 PROCEDURE — G8427 DOCREV CUR MEDS BY ELIG CLIN: HCPCS | Performed by: ORTHOPAEDIC SURGERY

## 2018-12-27 PROCEDURE — G8482 FLU IMMUNIZE ORDER/ADMIN: HCPCS | Performed by: ORTHOPAEDIC SURGERY

## 2018-12-27 PROCEDURE — G8399 PT W/DXA RESULTS DOCUMENT: HCPCS | Performed by: ORTHOPAEDIC SURGERY

## 2018-12-27 PROCEDURE — 1123F ACP DISCUSS/DSCN MKR DOCD: CPT | Performed by: ORTHOPAEDIC SURGERY

## 2018-12-27 RX ORDER — TRIAMCINOLONE ACETONIDE 40 MG/ML
40 INJECTION, SUSPENSION INTRA-ARTICULAR; INTRAMUSCULAR ONCE
Status: COMPLETED | OUTPATIENT
Start: 2018-12-27 | End: 2018-12-27

## 2018-12-27 RX ADMIN — TRIAMCINOLONE ACETONIDE 40 MG: 40 INJECTION, SUSPENSION INTRA-ARTICULAR; INTRAMUSCULAR at 09:54

## 2018-12-31 ENCOUNTER — APPOINTMENT (OUTPATIENT)
Dept: GENERAL RADIOLOGY | Age: 78
End: 2018-12-31
Payer: MEDICARE

## 2018-12-31 ENCOUNTER — HOSPITAL ENCOUNTER (EMERGENCY)
Age: 78
Discharge: HOME | End: 2018-12-31
Payer: MEDICARE

## 2018-12-31 VITALS
HEIGHT: 63 IN | DIASTOLIC BLOOD PRESSURE: 93 MMHG | SYSTOLIC BLOOD PRESSURE: 187 MMHG | WEIGHT: 152 LBS | RESPIRATION RATE: 20 BRPM | TEMPERATURE: 98.4 F | BODY MASS INDEX: 26.93 KG/M2 | HEART RATE: 66 BPM | OXYGEN SATURATION: 98 %

## 2018-12-31 DIAGNOSIS — M25.561 RIGHT KNEE PAIN, UNSPECIFIED CHRONICITY: Primary | ICD-10-CM

## 2018-12-31 PROCEDURE — 73560 X-RAY EXAM OF KNEE 1 OR 2: CPT

## 2018-12-31 PROCEDURE — 99283 EMERGENCY DEPT VISIT LOW MDM: CPT

## 2018-12-31 RX ORDER — HYDROCODONE BITARTRATE AND ACETAMINOPHEN 5; 325 MG/1; MG/1
1 TABLET ORAL EVERY 6 HOURS PRN
Qty: 20 TABLET | Refills: 0 | Status: SHIPPED | OUTPATIENT
Start: 2018-12-31 | End: 2019-01-05

## 2018-12-31 ASSESSMENT — PAIN DESCRIPTION - ORIENTATION: ORIENTATION: RIGHT

## 2018-12-31 ASSESSMENT — PAIN SCALES - GENERAL: PAINLEVEL_OUTOF10: 9

## 2018-12-31 ASSESSMENT — PAIN DESCRIPTION - PAIN TYPE: TYPE: CHRONIC PAIN

## 2018-12-31 ASSESSMENT — PAIN DESCRIPTION - LOCATION: LOCATION: KNEE

## 2019-01-08 ENCOUNTER — OFFICE VISIT (OUTPATIENT)
Dept: ORTHOPEDIC SURGERY | Age: 79
End: 2019-01-08
Payer: MEDICARE

## 2019-01-08 VITALS — RESPIRATION RATE: 16 BRPM | BODY MASS INDEX: 26.93 KG/M2 | HEIGHT: 63 IN | WEIGHT: 152 LBS

## 2019-01-08 DIAGNOSIS — M25.551 RIGHT HIP PAIN: ICD-10-CM

## 2019-01-08 DIAGNOSIS — M19.071 PRIMARY OSTEOARTHRITIS OF RIGHT ANKLE: Primary | ICD-10-CM

## 2019-01-08 PROCEDURE — 1123F ACP DISCUSS/DSCN MKR DOCD: CPT | Performed by: ORTHOPAEDIC SURGERY

## 2019-01-08 PROCEDURE — G8427 DOCREV CUR MEDS BY ELIG CLIN: HCPCS | Performed by: ORTHOPAEDIC SURGERY

## 2019-01-08 PROCEDURE — 1101F PT FALLS ASSESS-DOCD LE1/YR: CPT | Performed by: ORTHOPAEDIC SURGERY

## 2019-01-08 PROCEDURE — G8399 PT W/DXA RESULTS DOCUMENT: HCPCS | Performed by: ORTHOPAEDIC SURGERY

## 2019-01-08 PROCEDURE — G8482 FLU IMMUNIZE ORDER/ADMIN: HCPCS | Performed by: ORTHOPAEDIC SURGERY

## 2019-01-08 PROCEDURE — 99214 OFFICE O/P EST MOD 30 MIN: CPT | Performed by: ORTHOPAEDIC SURGERY

## 2019-01-08 PROCEDURE — G8417 CALC BMI ABV UP PARAM F/U: HCPCS | Performed by: ORTHOPAEDIC SURGERY

## 2019-01-08 PROCEDURE — 1090F PRES/ABSN URINE INCON ASSESS: CPT | Performed by: ORTHOPAEDIC SURGERY

## 2019-01-08 PROCEDURE — 4004F PT TOBACCO SCREEN RCVD TLK: CPT | Performed by: ORTHOPAEDIC SURGERY

## 2019-01-08 PROCEDURE — 4040F PNEUMOC VAC/ADMIN/RCVD: CPT | Performed by: ORTHOPAEDIC SURGERY

## 2019-01-08 RX ORDER — MELOXICAM 7.5 MG/1
7.5 TABLET ORAL DAILY
Qty: 30 TABLET | Refills: 0 | Status: SHIPPED | OUTPATIENT
Start: 2019-01-08 | End: 2019-03-21 | Stop reason: ALTCHOICE

## 2019-01-10 ENCOUNTER — HOSPITAL ENCOUNTER (OUTPATIENT)
Dept: PHYSICAL THERAPY | Age: 79
Setting detail: THERAPIES SERIES
Discharge: HOME | End: 2019-01-10
Payer: MEDICARE

## 2019-01-10 PROCEDURE — 97530 THERAPEUTIC ACTIVITIES: CPT

## 2019-01-10 PROCEDURE — 97110 THERAPEUTIC EXERCISES: CPT

## 2019-01-10 PROCEDURE — 97161 PT EVAL LOW COMPLEX 20 MIN: CPT

## 2019-01-10 ASSESSMENT — PAIN SCALES - GENERAL: PAINLEVEL_OUTOF10: 6

## 2019-01-10 ASSESSMENT — PAIN DESCRIPTION - PAIN TYPE: TYPE: ACUTE PAIN

## 2019-01-15 ENCOUNTER — HOSPITAL ENCOUNTER (OUTPATIENT)
Dept: PHYSICAL THERAPY | Age: 79
Setting detail: THERAPIES SERIES
Discharge: HOME | End: 2019-01-15
Payer: MEDICARE

## 2019-01-15 PROCEDURE — 97110 THERAPEUTIC EXERCISES: CPT

## 2019-01-15 PROCEDURE — 97530 THERAPEUTIC ACTIVITIES: CPT

## 2019-01-17 ENCOUNTER — HOSPITAL ENCOUNTER (OUTPATIENT)
Dept: PHYSICAL THERAPY | Age: 79
Setting detail: THERAPIES SERIES
Discharge: HOME | End: 2019-01-17
Payer: MEDICARE

## 2019-01-17 PROCEDURE — 97110 THERAPEUTIC EXERCISES: CPT

## 2019-01-22 ENCOUNTER — HOSPITAL ENCOUNTER (OUTPATIENT)
Dept: PHYSICAL THERAPY | Age: 79
Setting detail: THERAPIES SERIES
Discharge: HOME | End: 2019-01-22
Payer: MEDICARE

## 2019-01-22 PROCEDURE — 97530 THERAPEUTIC ACTIVITIES: CPT

## 2019-01-22 PROCEDURE — 97110 THERAPEUTIC EXERCISES: CPT

## 2019-01-24 ENCOUNTER — HOSPITAL ENCOUNTER (OUTPATIENT)
Dept: PHYSICAL THERAPY | Age: 79
Setting detail: THERAPIES SERIES
Discharge: HOME | End: 2019-01-24
Payer: MEDICARE

## 2019-01-24 PROCEDURE — 97530 THERAPEUTIC ACTIVITIES: CPT

## 2019-01-24 PROCEDURE — 97110 THERAPEUTIC EXERCISES: CPT

## 2019-01-25 DIAGNOSIS — G25.81 RESTLESS LEG SYNDROME: ICD-10-CM

## 2019-01-25 RX ORDER — CLONAZEPAM 1 MG/1
TABLET ORAL
Qty: 30 TABLET | Refills: 1 | OUTPATIENT
Start: 2019-01-25 | End: 2019-02-24

## 2019-01-29 ENCOUNTER — HOSPITAL ENCOUNTER (OUTPATIENT)
Dept: PHYSICAL THERAPY | Age: 79
Setting detail: THERAPIES SERIES
Discharge: HOME | End: 2019-01-29
Payer: MEDICARE

## 2019-01-29 PROCEDURE — 97530 THERAPEUTIC ACTIVITIES: CPT

## 2019-01-29 PROCEDURE — 97110 THERAPEUTIC EXERCISES: CPT

## 2019-01-30 ENCOUNTER — OFFICE VISIT (OUTPATIENT)
Dept: FAMILY MEDICINE CLINIC | Age: 79
End: 2019-01-30
Payer: MEDICARE

## 2019-01-30 VITALS
RESPIRATION RATE: 16 BRPM | BODY MASS INDEX: 26.26 KG/M2 | WEIGHT: 148.2 LBS | DIASTOLIC BLOOD PRESSURE: 60 MMHG | TEMPERATURE: 98.4 F | HEIGHT: 63 IN | SYSTOLIC BLOOD PRESSURE: 116 MMHG | OXYGEN SATURATION: 98 % | HEART RATE: 62 BPM

## 2019-01-30 DIAGNOSIS — Z79.899 LONG TERM USE OF DRUG: ICD-10-CM

## 2019-01-30 DIAGNOSIS — G25.81 RESTLESS LEG SYNDROME: Primary | ICD-10-CM

## 2019-01-30 DIAGNOSIS — H65.02 ACUTE SEROUS OTITIS MEDIA OF LEFT EAR, RECURRENCE NOT SPECIFIED: ICD-10-CM

## 2019-01-30 LAB
AMPHETAMINE SCREEN, URINE: NORMAL
BARBITURATE SCREEN, URINE: NORMAL
BENZODIAZEPINE SCREEN, URINE: NORMAL
BUPRENORPHINE URINE: NORMAL
COCAINE METABOLITE SCREEN URINE: NORMAL
GABAPENTIN SCREEN, URINE: NORMAL
MDMA URINE: NORMAL
METHADONE SCREEN, URINE: NORMAL
METHAMPHETAMINE, URINE: NORMAL
OPIATE SCREEN URINE: NORMAL
OXYCODONE SCREEN URINE: NORMAL
PHENCYCLIDINE SCREEN URINE: NORMAL
PROPOXYPHENE SCREEN, URINE: NORMAL
THC SCREEN, URINE: NORMAL
TRICYCLIC ANTIDEPRESSANTS, UR: NORMAL

## 2019-01-30 PROCEDURE — G8417 CALC BMI ABV UP PARAM F/U: HCPCS | Performed by: FAMILY MEDICINE

## 2019-01-30 PROCEDURE — 4004F PT TOBACCO SCREEN RCVD TLK: CPT | Performed by: FAMILY MEDICINE

## 2019-01-30 PROCEDURE — G8399 PT W/DXA RESULTS DOCUMENT: HCPCS | Performed by: FAMILY MEDICINE

## 2019-01-30 PROCEDURE — G8482 FLU IMMUNIZE ORDER/ADMIN: HCPCS | Performed by: FAMILY MEDICINE

## 2019-01-30 PROCEDURE — 1101F PT FALLS ASSESS-DOCD LE1/YR: CPT | Performed by: FAMILY MEDICINE

## 2019-01-30 PROCEDURE — G8427 DOCREV CUR MEDS BY ELIG CLIN: HCPCS | Performed by: FAMILY MEDICINE

## 2019-01-30 PROCEDURE — 1123F ACP DISCUSS/DSCN MKR DOCD: CPT | Performed by: FAMILY MEDICINE

## 2019-01-30 PROCEDURE — 1090F PRES/ABSN URINE INCON ASSESS: CPT | Performed by: FAMILY MEDICINE

## 2019-01-30 PROCEDURE — 4040F PNEUMOC VAC/ADMIN/RCVD: CPT | Performed by: FAMILY MEDICINE

## 2019-01-30 PROCEDURE — 99213 OFFICE O/P EST LOW 20 MIN: CPT | Performed by: FAMILY MEDICINE

## 2019-01-30 PROCEDURE — 80305 DRUG TEST PRSMV DIR OPT OBS: CPT | Performed by: FAMILY MEDICINE

## 2019-01-30 RX ORDER — PREDNISONE 10 MG/1
TABLET ORAL
Qty: 25 TABLET | Refills: 0 | Status: SHIPPED | OUTPATIENT
Start: 2019-01-30 | End: 2019-03-12 | Stop reason: ALTCHOICE

## 2019-01-30 RX ORDER — CLONAZEPAM 1 MG/1
TABLET ORAL
Qty: 30 TABLET | Refills: 2 | Status: SHIPPED | OUTPATIENT
Start: 2019-01-30 | End: 2019-07-25 | Stop reason: SDUPTHER

## 2019-01-30 RX ORDER — DOXYCYCLINE 100 MG/1
100 TABLET ORAL 2 TIMES DAILY
Qty: 20 TABLET | Refills: 0 | Status: SHIPPED | OUTPATIENT
Start: 2019-01-30 | End: 2019-02-09

## 2019-01-30 ASSESSMENT — ENCOUNTER SYMPTOMS
ABDOMINAL PAIN: 0
COUGH: 0
VOMITING: 0
DIARRHEA: 0
SHORTNESS OF BREATH: 0
WHEEZING: 0

## 2019-01-31 ENCOUNTER — HOSPITAL ENCOUNTER (OUTPATIENT)
Dept: PHYSICAL THERAPY | Age: 79
Setting detail: THERAPIES SERIES
Discharge: HOME | End: 2019-01-31
Payer: MEDICARE

## 2019-01-31 PROCEDURE — 97530 THERAPEUTIC ACTIVITIES: CPT

## 2019-01-31 PROCEDURE — 97110 THERAPEUTIC EXERCISES: CPT

## 2019-02-05 ENCOUNTER — HOSPITAL ENCOUNTER (OUTPATIENT)
Dept: PHYSICAL THERAPY | Age: 79
Setting detail: THERAPIES SERIES
Discharge: HOME | End: 2019-02-05
Payer: MEDICARE

## 2019-02-05 PROCEDURE — 97110 THERAPEUTIC EXERCISES: CPT

## 2019-02-05 PROCEDURE — 97112 NEUROMUSCULAR REEDUCATION: CPT

## 2019-02-07 ENCOUNTER — HOSPITAL ENCOUNTER (OUTPATIENT)
Dept: PHYSICAL THERAPY | Age: 79
Setting detail: THERAPIES SERIES
Discharge: HOME | End: 2019-02-07
Payer: MEDICARE

## 2019-02-07 PROCEDURE — 97110 THERAPEUTIC EXERCISES: CPT

## 2019-02-07 PROCEDURE — 97530 THERAPEUTIC ACTIVITIES: CPT

## 2019-02-19 ENCOUNTER — OFFICE VISIT (OUTPATIENT)
Dept: ORTHOPEDIC SURGERY | Age: 79
End: 2019-02-19
Payer: MEDICARE

## 2019-02-19 ENCOUNTER — TELEPHONE (OUTPATIENT)
Dept: ORTHOPEDIC SURGERY | Age: 79
End: 2019-02-19

## 2019-02-19 VITALS
SYSTOLIC BLOOD PRESSURE: 136 MMHG | WEIGHT: 152 LBS | HEIGHT: 63 IN | DIASTOLIC BLOOD PRESSURE: 84 MMHG | RESPIRATION RATE: 16 BRPM | BODY MASS INDEX: 26.93 KG/M2 | HEART RATE: 64 BPM

## 2019-02-19 DIAGNOSIS — S83.281A ACUTE TEAR LATERAL MENISCUS, RIGHT, INITIAL ENCOUNTER: Primary | ICD-10-CM

## 2019-02-19 PROCEDURE — 1101F PT FALLS ASSESS-DOCD LE1/YR: CPT | Performed by: ORTHOPAEDIC SURGERY

## 2019-02-19 PROCEDURE — 1036F TOBACCO NON-USER: CPT | Performed by: ORTHOPAEDIC SURGERY

## 2019-02-19 PROCEDURE — G8427 DOCREV CUR MEDS BY ELIG CLIN: HCPCS | Performed by: ORTHOPAEDIC SURGERY

## 2019-02-19 PROCEDURE — G8482 FLU IMMUNIZE ORDER/ADMIN: HCPCS | Performed by: ORTHOPAEDIC SURGERY

## 2019-02-19 PROCEDURE — 1123F ACP DISCUSS/DSCN MKR DOCD: CPT | Performed by: ORTHOPAEDIC SURGERY

## 2019-02-19 PROCEDURE — G8417 CALC BMI ABV UP PARAM F/U: HCPCS | Performed by: ORTHOPAEDIC SURGERY

## 2019-02-19 PROCEDURE — 99214 OFFICE O/P EST MOD 30 MIN: CPT | Performed by: ORTHOPAEDIC SURGERY

## 2019-02-19 PROCEDURE — G8399 PT W/DXA RESULTS DOCUMENT: HCPCS | Performed by: ORTHOPAEDIC SURGERY

## 2019-02-19 PROCEDURE — 4040F PNEUMOC VAC/ADMIN/RCVD: CPT | Performed by: ORTHOPAEDIC SURGERY

## 2019-02-19 PROCEDURE — 1090F PRES/ABSN URINE INCON ASSESS: CPT | Performed by: ORTHOPAEDIC SURGERY

## 2019-02-19 RX ORDER — MELOXICAM 7.5 MG/1
7.5 TABLET ORAL DAILY
Qty: 60 TABLET | Refills: 1 | Status: SHIPPED | OUTPATIENT
Start: 2019-02-19 | End: 2019-03-21 | Stop reason: ALTCHOICE

## 2019-02-26 ENCOUNTER — HOSPITAL ENCOUNTER (OUTPATIENT)
Dept: MRI IMAGING | Age: 79
Discharge: HOME | End: 2019-02-26
Payer: MEDICARE

## 2019-02-26 DIAGNOSIS — S83.281A ACUTE TEAR LATERAL MENISCUS, RIGHT, INITIAL ENCOUNTER: ICD-10-CM

## 2019-02-26 PROCEDURE — 73721 MRI JNT OF LWR EXTRE W/O DYE: CPT

## 2019-02-28 ENCOUNTER — OFFICE VISIT (OUTPATIENT)
Dept: ORTHOPEDIC SURGERY | Age: 79
End: 2019-02-28
Payer: MEDICARE

## 2019-02-28 VITALS
HEART RATE: 63 BPM | TEMPERATURE: 98 F | BODY MASS INDEX: 26.93 KG/M2 | DIASTOLIC BLOOD PRESSURE: 92 MMHG | WEIGHT: 152 LBS | HEIGHT: 63 IN | SYSTOLIC BLOOD PRESSURE: 156 MMHG | RESPIRATION RATE: 16 BRPM

## 2019-02-28 DIAGNOSIS — M17.11 PRIMARY OSTEOARTHRITIS OF RIGHT KNEE: Primary | ICD-10-CM

## 2019-02-28 PROCEDURE — G8399 PT W/DXA RESULTS DOCUMENT: HCPCS | Performed by: ORTHOPAEDIC SURGERY

## 2019-02-28 PROCEDURE — G8417 CALC BMI ABV UP PARAM F/U: HCPCS | Performed by: ORTHOPAEDIC SURGERY

## 2019-02-28 PROCEDURE — 1036F TOBACCO NON-USER: CPT | Performed by: ORTHOPAEDIC SURGERY

## 2019-02-28 PROCEDURE — 1090F PRES/ABSN URINE INCON ASSESS: CPT | Performed by: ORTHOPAEDIC SURGERY

## 2019-02-28 PROCEDURE — 1101F PT FALLS ASSESS-DOCD LE1/YR: CPT | Performed by: ORTHOPAEDIC SURGERY

## 2019-02-28 PROCEDURE — G8427 DOCREV CUR MEDS BY ELIG CLIN: HCPCS | Performed by: ORTHOPAEDIC SURGERY

## 2019-02-28 PROCEDURE — 1123F ACP DISCUSS/DSCN MKR DOCD: CPT | Performed by: ORTHOPAEDIC SURGERY

## 2019-02-28 PROCEDURE — 99214 OFFICE O/P EST MOD 30 MIN: CPT | Performed by: ORTHOPAEDIC SURGERY

## 2019-02-28 PROCEDURE — 4040F PNEUMOC VAC/ADMIN/RCVD: CPT | Performed by: ORTHOPAEDIC SURGERY

## 2019-02-28 PROCEDURE — G8482 FLU IMMUNIZE ORDER/ADMIN: HCPCS | Performed by: ORTHOPAEDIC SURGERY

## 2019-03-05 ENCOUNTER — PREP FOR PROCEDURE (OUTPATIENT)
Dept: ORTHOPEDIC SURGERY | Age: 79
End: 2019-03-05

## 2019-03-05 ENCOUNTER — OFFICE VISIT (OUTPATIENT)
Dept: ORTHOPEDIC SURGERY | Age: 79
End: 2019-03-05
Payer: MEDICARE

## 2019-03-05 VITALS — BODY MASS INDEX: 26.93 KG/M2 | HEIGHT: 63 IN | RESPIRATION RATE: 16 BRPM | HEART RATE: 64 BPM | WEIGHT: 152 LBS

## 2019-03-05 DIAGNOSIS — M79.672 FOOT PAIN, LEFT: Primary | ICD-10-CM

## 2019-03-05 DIAGNOSIS — M17.11 PRIMARY OSTEOARTHRITIS OF RIGHT KNEE: Primary | ICD-10-CM

## 2019-03-05 DIAGNOSIS — M25.572 LEFT ANKLE PAIN, UNSPECIFIED CHRONICITY: ICD-10-CM

## 2019-03-05 PROCEDURE — G8399 PT W/DXA RESULTS DOCUMENT: HCPCS | Performed by: ORTHOPAEDIC SURGERY

## 2019-03-05 PROCEDURE — G8427 DOCREV CUR MEDS BY ELIG CLIN: HCPCS | Performed by: ORTHOPAEDIC SURGERY

## 2019-03-05 PROCEDURE — 1101F PT FALLS ASSESS-DOCD LE1/YR: CPT | Performed by: ORTHOPAEDIC SURGERY

## 2019-03-05 PROCEDURE — 1036F TOBACCO NON-USER: CPT | Performed by: ORTHOPAEDIC SURGERY

## 2019-03-05 PROCEDURE — G8482 FLU IMMUNIZE ORDER/ADMIN: HCPCS | Performed by: ORTHOPAEDIC SURGERY

## 2019-03-05 PROCEDURE — 1123F ACP DISCUSS/DSCN MKR DOCD: CPT | Performed by: ORTHOPAEDIC SURGERY

## 2019-03-05 PROCEDURE — G8417 CALC BMI ABV UP PARAM F/U: HCPCS | Performed by: ORTHOPAEDIC SURGERY

## 2019-03-05 PROCEDURE — 4040F PNEUMOC VAC/ADMIN/RCVD: CPT | Performed by: ORTHOPAEDIC SURGERY

## 2019-03-05 PROCEDURE — 1090F PRES/ABSN URINE INCON ASSESS: CPT | Performed by: ORTHOPAEDIC SURGERY

## 2019-03-05 PROCEDURE — 99213 OFFICE O/P EST LOW 20 MIN: CPT | Performed by: ORTHOPAEDIC SURGERY

## 2019-03-06 ENCOUNTER — TELEPHONE (OUTPATIENT)
Dept: ORTHOPEDIC SURGERY | Age: 79
End: 2019-03-06

## 2019-03-06 ENCOUNTER — HOSPITAL ENCOUNTER (OUTPATIENT)
Dept: PREADMISSION TESTING | Age: 79
Discharge: HOME | End: 2019-03-10
Payer: MEDICARE

## 2019-03-06 DIAGNOSIS — M17.11 PRIMARY OSTEOARTHRITIS OF RIGHT KNEE: ICD-10-CM

## 2019-03-06 LAB
ABO/RH: NORMAL
ALBUMIN SERPL-MCNC: 4.5 G/DL (ref 3.4–5)
ANION GAP SERPL CALCULATED.3IONS-SCNC: 13 MMOL/L (ref 3–16)
ANTIBODY SCREEN: NORMAL
APTT: 30.4 SEC (ref 26–36)
BASOPHILS ABSOLUTE: 0 K/UL (ref 0–0.2)
BASOPHILS RELATIVE PERCENT: 0.4 %
BILIRUBIN URINE: NEGATIVE
BLOOD, URINE: NEGATIVE
BUN BLDV-MCNC: 18 MG/DL (ref 7–20)
CALCIUM SERPL-MCNC: 10.2 MG/DL (ref 8.3–10.6)
CHLORIDE BLD-SCNC: 101 MMOL/L (ref 99–110)
CLARITY: CLEAR
CO2: 29 MMOL/L (ref 21–32)
COLOR: YELLOW
CREAT SERPL-MCNC: 0.8 MG/DL (ref 0.6–1.2)
EKG ATRIAL RATE: 60 BPM
EKG DIAGNOSIS: NORMAL
EKG P AXIS: 67 DEGREES
EKG P-R INTERVAL: 144 MS
EKG Q-T INTERVAL: 444 MS
EKG QRS DURATION: 76 MS
EKG QTC CALCULATION (BAZETT): 444 MS
EKG R AXIS: 3 DEGREES
EKG T AXIS: 56 DEGREES
EKG VENTRICULAR RATE: 60 BPM
EOSINOPHILS ABSOLUTE: 0.1 K/UL (ref 0–0.6)
EOSINOPHILS RELATIVE PERCENT: 0.8 %
GFR AFRICAN AMERICAN: >60
GFR NON-AFRICAN AMERICAN: >60
GLUCOSE BLD-MCNC: 101 MG/DL (ref 70–99)
GLUCOSE URINE: NEGATIVE MG/DL
HCT VFR BLD CALC: 48.1 % (ref 36–48)
HEMOGLOBIN: 15.7 G/DL (ref 12–16)
INR BLD: 0.93 (ref 0.86–1.14)
KETONES, URINE: NEGATIVE MG/DL
LEUKOCYTE ESTERASE, URINE: NEGATIVE
LYMPHOCYTES ABSOLUTE: 2.5 K/UL (ref 1–5.1)
LYMPHOCYTES RELATIVE PERCENT: 33.8 %
MCH RBC QN AUTO: 30.2 PG (ref 26–34)
MCHC RBC AUTO-ENTMCNC: 32.6 G/DL (ref 31–36)
MCV RBC AUTO: 92.8 FL (ref 80–100)
MICROSCOPIC EXAMINATION: NORMAL
MONOCYTES ABSOLUTE: 0.5 K/UL (ref 0–1.3)
MONOCYTES RELATIVE PERCENT: 6.6 %
NEUTROPHILS ABSOLUTE: 4.3 K/UL (ref 1.7–7.7)
NEUTROPHILS RELATIVE PERCENT: 58.4 %
NITRITE, URINE: NEGATIVE
PDW BLD-RTO: 14.4 % (ref 12.4–15.4)
PH UA: 5 (ref 5–8)
PLATELET # BLD: 263 K/UL (ref 135–450)
PMV BLD AUTO: 7.9 FL (ref 5–10.5)
POTASSIUM SERPL-SCNC: 4.6 MMOL/L (ref 3.5–5.1)
PREALBUMIN: 28.2 MG/DL (ref 20–40)
PROTEIN UA: NEGATIVE MG/DL
PROTHROMBIN TIME: 10.6 SEC (ref 9.8–13)
RBC # BLD: 5.19 M/UL (ref 4–5.2)
SEDIMENTATION RATE, ERYTHROCYTE: 10 MM/HR (ref 0–30)
SODIUM BLD-SCNC: 143 MMOL/L (ref 136–145)
SPECIFIC GRAVITY UA: 1.02 (ref 1–1.03)
URINE REFLEX TO CULTURE: NORMAL
URINE TYPE: NORMAL
UROBILINOGEN, URINE: 0.2 E.U./DL
VITAMIN D 25-HYDROXY: 43.3 NG/ML
WBC # BLD: 7.4 K/UL (ref 4–11)

## 2019-03-06 PROCEDURE — 82306 VITAMIN D 25 HYDROXY: CPT

## 2019-03-06 PROCEDURE — 81003 URINALYSIS AUTO W/O SCOPE: CPT

## 2019-03-06 PROCEDURE — 84134 ASSAY OF PREALBUMIN: CPT

## 2019-03-06 PROCEDURE — 85025 COMPLETE CBC W/AUTO DIFF WBC: CPT

## 2019-03-06 PROCEDURE — 93005 ELECTROCARDIOGRAM TRACING: CPT | Performed by: ORTHOPAEDIC SURGERY

## 2019-03-06 PROCEDURE — 85610 PROTHROMBIN TIME: CPT

## 2019-03-06 PROCEDURE — 80048 BASIC METABOLIC PNL TOTAL CA: CPT

## 2019-03-06 PROCEDURE — 93010 ELECTROCARDIOGRAM REPORT: CPT | Performed by: INTERNAL MEDICINE

## 2019-03-06 PROCEDURE — 86850 RBC ANTIBODY SCREEN: CPT

## 2019-03-06 PROCEDURE — 83036 HEMOGLOBIN GLYCOSYLATED A1C: CPT

## 2019-03-06 PROCEDURE — 82040 ASSAY OF SERUM ALBUMIN: CPT

## 2019-03-06 PROCEDURE — 86900 BLOOD TYPING SEROLOGIC ABO: CPT

## 2019-03-06 PROCEDURE — 85652 RBC SED RATE AUTOMATED: CPT

## 2019-03-06 PROCEDURE — 86901 BLOOD TYPING SEROLOGIC RH(D): CPT

## 2019-03-06 PROCEDURE — 85730 THROMBOPLASTIN TIME PARTIAL: CPT

## 2019-03-06 PROCEDURE — 87641 MR-STAPH DNA AMP PROBE: CPT

## 2019-03-06 NOTE — CARE COORDINATION
DATE OF JET CLASS INTERVIEW: 3-6-19  Pt is here with her . FIRST JOINT REPLACEMENT? yes     CHOICES FOR HHC, DME VENDORS AND SKILLED/ REHAB FACILITIES PROVIDED TO PT DURING THIS INTERVIEW. DISCHARGE PLAN:/ INCLUDING WHO WILL BE STAYING WITH YOU AT HOME? Pt plans to return to her two story home in the care of her retired . 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 She walks with no AD prior to this surgery. There are 2 entry steps into the house and 11 to the upstairs bedroom, but she states she can stay in the first floor bedroom. LENGTH OF STAY HAS BEEN DISCUSSED WITH THE PT, APPROPRIATE TO HIS/ HER PROCEDURE. PT HAS BEEN INFORMED THAT THEY WILL BE DISCHARGED WHEN THE PHYSICIAN DEEMS THEM MEDICALLY READY. MOST PATIENTS CAN EXPECT  TO BE IN THE HOSPITAL ONE NIGHT AS AN OBSERVATION ONLY, OR 1-2 DAYS AS AN ADMISSION FOR THOSE WITH MEDICAL HEALTH  ISSUES/COMPLICATIONS. HOME CARE: pt declined a home care list.  She requests a referral be made to [facility] post op. SNF/REHAB: na    DME: Ilir Arango already has a two wheeled walker and a shower chair. She states that she will not need a RTS as she is only  5' 3 and there is a sink next to the main commode. PT AGREEABLE TO MEDS TO BEDS FROM [facility]. TRANSPORTATION: her daughter, Felipe Molina, or her  will drive her home. Contact information for case management provided to pt. Will follow with therapies and social service. Jeramy Brown R.N.     [phone]

## 2019-03-07 LAB
ESTIMATED AVERAGE GLUCOSE: 131.2 MG/DL
HBA1C MFR BLD: 6.2 %
MRSA SCREEN RT-PCR: NORMAL

## 2019-03-08 ENCOUNTER — TELEPHONE (OUTPATIENT)
Dept: ORTHOPEDIC SURGERY | Age: 79
End: 2019-03-08

## 2019-03-08 ENCOUNTER — PREP FOR PROCEDURE (OUTPATIENT)
Dept: ORTHOPEDICS UNIT | Age: 79
End: 2019-03-08

## 2019-03-12 ENCOUNTER — OFFICE VISIT (OUTPATIENT)
Dept: FAMILY MEDICINE CLINIC | Age: 79
End: 2019-03-12
Payer: MEDICARE

## 2019-03-12 VITALS
TEMPERATURE: 97.7 F | HEIGHT: 63 IN | HEART RATE: 68 BPM | WEIGHT: 152.6 LBS | DIASTOLIC BLOOD PRESSURE: 68 MMHG | BODY MASS INDEX: 27.04 KG/M2 | SYSTOLIC BLOOD PRESSURE: 118 MMHG | OXYGEN SATURATION: 99 %

## 2019-03-12 DIAGNOSIS — R30.0 DYSURIA: Primary | ICD-10-CM

## 2019-03-12 LAB
BILIRUBIN, POC: ABNORMAL
BLOOD URINE, POC: ABNORMAL
CLARITY, POC: ABNORMAL
COLOR, POC: YELLOW
GLUCOSE URINE, POC: ABNORMAL
KETONES, POC: ABNORMAL
LEUKOCYTE EST, POC: ABNORMAL
NITRITE, POC: ABNORMAL
PH, POC: 7.5
PROTEIN, POC: 100
SPECIFIC GRAVITY, POC: 1.02
UROBILINOGEN, POC: 0.2

## 2019-03-12 PROCEDURE — G8399 PT W/DXA RESULTS DOCUMENT: HCPCS | Performed by: NURSE PRACTITIONER

## 2019-03-12 PROCEDURE — G8482 FLU IMMUNIZE ORDER/ADMIN: HCPCS | Performed by: NURSE PRACTITIONER

## 2019-03-12 PROCEDURE — 1101F PT FALLS ASSESS-DOCD LE1/YR: CPT | Performed by: NURSE PRACTITIONER

## 2019-03-12 PROCEDURE — 1036F TOBACCO NON-USER: CPT | Performed by: NURSE PRACTITIONER

## 2019-03-12 PROCEDURE — G8427 DOCREV CUR MEDS BY ELIG CLIN: HCPCS | Performed by: NURSE PRACTITIONER

## 2019-03-12 PROCEDURE — G8417 CALC BMI ABV UP PARAM F/U: HCPCS | Performed by: NURSE PRACTITIONER

## 2019-03-12 PROCEDURE — 4040F PNEUMOC VAC/ADMIN/RCVD: CPT | Performed by: NURSE PRACTITIONER

## 2019-03-12 PROCEDURE — 1090F PRES/ABSN URINE INCON ASSESS: CPT | Performed by: NURSE PRACTITIONER

## 2019-03-12 PROCEDURE — 81002 URINALYSIS NONAUTO W/O SCOPE: CPT | Performed by: NURSE PRACTITIONER

## 2019-03-12 PROCEDURE — 99213 OFFICE O/P EST LOW 20 MIN: CPT | Performed by: NURSE PRACTITIONER

## 2019-03-12 PROCEDURE — 1123F ACP DISCUSS/DSCN MKR DOCD: CPT | Performed by: NURSE PRACTITIONER

## 2019-03-12 RX ORDER — SULFAMETHOXAZOLE AND TRIMETHOPRIM 800; 160 MG/1; MG/1
1 TABLET ORAL 2 TIMES DAILY
Qty: 14 TABLET | Refills: 0 | Status: SHIPPED | OUTPATIENT
Start: 2019-03-12 | End: 2019-03-19

## 2019-03-12 ASSESSMENT — PATIENT HEALTH QUESTIONNAIRE - PHQ9
1. LITTLE INTEREST OR PLEASURE IN DOING THINGS: 0
SUM OF ALL RESPONSES TO PHQ QUESTIONS 1-9: 0
SUM OF ALL RESPONSES TO PHQ QUESTIONS 1-9: 0
2. FEELING DOWN, DEPRESSED OR HOPELESS: 0
SUM OF ALL RESPONSES TO PHQ9 QUESTIONS 1 & 2: 0

## 2019-03-12 ASSESSMENT — ENCOUNTER SYMPTOMS: ABDOMINAL PAIN: 0

## 2019-03-13 ENCOUNTER — TELEPHONE (OUTPATIENT)
Dept: ORTHOPEDIC SURGERY | Age: 79
End: 2019-03-13

## 2019-03-13 DIAGNOSIS — M17.11 PRIMARY OSTEOARTHRITIS OF RIGHT KNEE: Primary | ICD-10-CM

## 2019-03-14 LAB — URINE CULTURE, ROUTINE: NORMAL

## 2019-03-14 RX ORDER — OXYCODONE HYDROCHLORIDE 5 MG/1
10 TABLET ORAL ONCE
Status: CANCELLED | OUTPATIENT
Start: 2019-03-14 | End: 2019-03-14

## 2019-03-14 RX ORDER — CELECOXIB 200 MG/1
200 CAPSULE ORAL ONCE
Status: CANCELLED | OUTPATIENT
Start: 2019-03-14 | End: 2019-03-14

## 2019-03-15 ENCOUNTER — TELEPHONE (OUTPATIENT)
Dept: FAMILY MEDICINE CLINIC | Age: 79
End: 2019-03-15

## 2019-03-21 ENCOUNTER — OFFICE VISIT (OUTPATIENT)
Dept: FAMILY MEDICINE CLINIC | Age: 79
End: 2019-03-21
Payer: MEDICARE

## 2019-03-21 VITALS
HEIGHT: 63 IN | RESPIRATION RATE: 26 BRPM | TEMPERATURE: 98.2 F | BODY MASS INDEX: 26.93 KG/M2 | OXYGEN SATURATION: 96 % | HEART RATE: 64 BPM | WEIGHT: 152 LBS | DIASTOLIC BLOOD PRESSURE: 64 MMHG | SYSTOLIC BLOOD PRESSURE: 100 MMHG

## 2019-03-21 DIAGNOSIS — M17.11 PRIMARY OSTEOARTHRITIS OF RIGHT KNEE: ICD-10-CM

## 2019-03-21 DIAGNOSIS — Z01.818 PRE-OP EVALUATION: Primary | ICD-10-CM

## 2019-03-21 PROBLEM — R20.8 BURNING SENSATION OF MOUTH: Status: RESOLVED | Noted: 2017-06-22 | Resolved: 2019-03-21

## 2019-03-21 PROBLEM — H65.02 ACUTE SEROUS OTITIS MEDIA OF LEFT EAR: Status: RESOLVED | Noted: 2018-10-11 | Resolved: 2019-03-21

## 2019-03-21 PROBLEM — S52.532A CLOSED COLLES' FRACTURE OF LEFT RADIUS: Status: RESOLVED | Noted: 2017-05-23 | Resolved: 2019-03-21

## 2019-03-21 PROCEDURE — 1101F PT FALLS ASSESS-DOCD LE1/YR: CPT | Performed by: REGISTERED NURSE

## 2019-03-21 PROCEDURE — 99214 OFFICE O/P EST MOD 30 MIN: CPT | Performed by: REGISTERED NURSE

## 2019-03-21 PROCEDURE — G8417 CALC BMI ABV UP PARAM F/U: HCPCS | Performed by: REGISTERED NURSE

## 2019-03-21 PROCEDURE — G8427 DOCREV CUR MEDS BY ELIG CLIN: HCPCS | Performed by: REGISTERED NURSE

## 2019-03-21 PROCEDURE — G8482 FLU IMMUNIZE ORDER/ADMIN: HCPCS | Performed by: REGISTERED NURSE

## 2019-03-21 PROCEDURE — 1090F PRES/ABSN URINE INCON ASSESS: CPT | Performed by: REGISTERED NURSE

## 2019-03-21 ASSESSMENT — PATIENT HEALTH QUESTIONNAIRE - PHQ9
SUM OF ALL RESPONSES TO PHQ QUESTIONS 1-9: 0
SUM OF ALL RESPONSES TO PHQ9 QUESTIONS 1 & 2: 0
1. LITTLE INTEREST OR PLEASURE IN DOING THINGS: 0
SUM OF ALL RESPONSES TO PHQ QUESTIONS 1-9: 0
2. FEELING DOWN, DEPRESSED OR HOPELESS: 0

## 2019-03-21 ASSESSMENT — ENCOUNTER SYMPTOMS
CONSTIPATION: 0
FACIAL SWELLING: 1
CHEST TIGHTNESS: 0
WHEEZING: 0
ABDOMINAL PAIN: 0
VOMITING: 0
SHORTNESS OF BREATH: 0
COUGH: 0
DIARRHEA: 0
NAUSEA: 0

## 2019-03-25 NOTE — PROGRESS NOTES
PRE-OP INSTRUCTIONS     · Do not eat or drink anything after 12:00 midnight prior to surgery. · This includes water, chewing gum, mints and ice chips. · You may brush your teeth and gargle the morning of surgery but DO  NOT SWALLOW THE WATER. Take the following medications with a small sip of water on the morning of procedure. ..    · You may be asked to stop blood thinners such as:  Coumadin, Plavix, Fragmin and lovenox. Please check with your doctor before stopping these or any other medications. · Aspirin, ibuprofen, advil and naproxen, any anti-inflammatory products should be stopped for a week prior to your surgery. · Do not smoke and do not drink any alcoholic beverages 24 hours prior to your surgery. · Please do not wear any jewelry or body piercings on the day of surgery. · Please wear something simple, loose fitting clothing to the hospital.  Do not wear any make-up(including eye make-up) or nail polish on your fingers and toes. As part of our patient safety program to minimize surgical infections, we ask you to do the following:    Please notify your surgeon if you develop any illness between now and the day of your surgery. This includes a cough, cold, fever, sore  throat, nausea, vomiting, diarrhea, etc.   Please notify your surgeon if you experience dizziness, shortness of  breath or blurred vision between now and the time of your surgery. Please notify your surgeon of any open or redden areas that may look infected       DO NOT shave your operative site 96 hours(four days) prior to surgery. Shower the week before surgery with an antibacterial soap such as:dial,safeguard, etc.       Three(3) days prior to your surgery, cleanse the operative site with Hibiclens(anti-microbial soap).  This soap may dry your skin, please do not apply any oils or lotions     · Please bring your insurance card and picture ID day of surgery    · If you have a living will or durable power of attorny. Please bring in a copy of you advanced directives. · If you have dentures, they will be removed before going to the OR, we will provide you with a container. If you wear contact lenses/glasses, they will be removes, please bring a case    · Have you seen your family doctor for a pre-op history and physical.      · Surgery scheduler will call you 48 hours prior to your surgery to notify you of the time of your surgery and the time you will need to be at hospital...patients are asked to arrive 2 1/2 hours prior to surgery. ·  Please call Pre-Admission testing if you have any further questions.                   [id number] [facility] [place] testing phone number:  [phone]      Thank You for choosing [facility]!!

## 2019-03-28 ENCOUNTER — OFFICE VISIT (OUTPATIENT)
Dept: ORTHOPEDIC SURGERY | Age: 79
End: 2019-03-28

## 2019-03-28 DIAGNOSIS — M17.11 PRIMARY OSTEOARTHRITIS OF RIGHT KNEE: Primary | ICD-10-CM

## 2019-03-28 PROCEDURE — 99024 POSTOP FOLLOW-UP VISIT: CPT | Performed by: ORTHOPAEDIC SURGERY

## 2019-03-29 ENCOUNTER — ANESTHESIA EVENT (OUTPATIENT)
Dept: OPERATING ROOM | Age: 79
End: 2019-03-29
Payer: MEDICARE

## 2019-04-01 ENCOUNTER — HOSPITAL ENCOUNTER (OUTPATIENT)
Age: 79
Discharge: HOME | End: 2019-04-02
Attending: ORTHOPAEDIC SURGERY | Admitting: ORTHOPAEDIC SURGERY
Payer: MEDICARE

## 2019-04-01 ENCOUNTER — ANESTHESIA (OUTPATIENT)
Dept: OPERATING ROOM | Age: 79
End: 2019-04-01
Payer: MEDICARE

## 2019-04-01 ENCOUNTER — APPOINTMENT (OUTPATIENT)
Dept: GENERAL RADIOLOGY | Age: 79
End: 2019-04-01
Attending: ORTHOPAEDIC SURGERY
Payer: MEDICARE

## 2019-04-01 VITALS
DIASTOLIC BLOOD PRESSURE: 46 MMHG | OXYGEN SATURATION: 98 % | TEMPERATURE: 97.5 F | RESPIRATION RATE: 7 BRPM | SYSTOLIC BLOOD PRESSURE: 81 MMHG

## 2019-04-01 DIAGNOSIS — M17.11 ARTHRITIS OF RIGHT KNEE: ICD-10-CM

## 2019-04-01 LAB
ABO/RH: NORMAL
ANTIBODY SCREEN: NORMAL
GLUCOSE BLD-MCNC: 114 MG/DL (ref 70–99)
GLUCOSE BLD-MCNC: 143 MG/DL (ref 70–99)
PERFORMED ON: ABNORMAL
PERFORMED ON: ABNORMAL

## 2019-04-01 PROCEDURE — 2709999900 HC NON-CHARGEABLE SUPPLY: Performed by: ORTHOPAEDIC SURGERY

## 2019-04-01 PROCEDURE — 86900 BLOOD TYPING SEROLOGIC ABO: CPT

## 2019-04-01 PROCEDURE — 97166 OT EVAL MOD COMPLEX 45 MIN: CPT

## 2019-04-01 PROCEDURE — 2500000003 HC RX 250 WO HCPCS: Performed by: NURSE ANESTHETIST, CERTIFIED REGISTERED

## 2019-04-01 PROCEDURE — 2500000003 HC RX 250 WO HCPCS: Performed by: ORTHOPAEDIC SURGERY

## 2019-04-01 PROCEDURE — 2720000010 HC SURG SUPPLY STERILE: Performed by: ORTHOPAEDIC SURGERY

## 2019-04-01 PROCEDURE — 3600000015 HC SURGERY LEVEL 5 ADDTL 15MIN: Performed by: ORTHOPAEDIC SURGERY

## 2019-04-01 PROCEDURE — 86901 BLOOD TYPING SEROLOGIC RH(D): CPT

## 2019-04-01 PROCEDURE — 2580000003 HC RX 258: Performed by: ANESTHESIOLOGY

## 2019-04-01 PROCEDURE — 2580000003 HC RX 258: Performed by: ORTHOPAEDIC SURGERY

## 2019-04-01 PROCEDURE — 2700000000 HC OXYGEN THERAPY PER DAY

## 2019-04-01 PROCEDURE — 97530 THERAPEUTIC ACTIVITIES: CPT

## 2019-04-01 PROCEDURE — 94761 N-INVAS EAR/PLS OXIMETRY MLT: CPT

## 2019-04-01 PROCEDURE — 3600000005 HC SURGERY LEVEL 5 BASE: Performed by: ORTHOPAEDIC SURGERY

## 2019-04-01 PROCEDURE — 97116 GAIT TRAINING THERAPY: CPT

## 2019-04-01 PROCEDURE — 3700000000 HC ANESTHESIA ATTENDED CARE: Performed by: ORTHOPAEDIC SURGERY

## 2019-04-01 PROCEDURE — 2500000003 HC RX 250 WO HCPCS: Performed by: ANESTHESIOLOGY

## 2019-04-01 PROCEDURE — 73560 X-RAY EXAM OF KNEE 1 OR 2: CPT

## 2019-04-01 PROCEDURE — 6360000002 HC RX W HCPCS: Performed by: ANESTHESIOLOGY

## 2019-04-01 PROCEDURE — 3700000001 HC ADD 15 MINUTES (ANESTHESIA): Performed by: ORTHOPAEDIC SURGERY

## 2019-04-01 PROCEDURE — 7100000001 HC PACU RECOVERY - ADDTL 15 MIN: Performed by: ORTHOPAEDIC SURGERY

## 2019-04-01 PROCEDURE — 88311 DECALCIFY TISSUE: CPT

## 2019-04-01 PROCEDURE — 94664 DEMO&/EVAL PT USE INHALER: CPT

## 2019-04-01 PROCEDURE — 86850 RBC ANTIBODY SCREEN: CPT

## 2019-04-01 PROCEDURE — 7100000000 HC PACU RECOVERY - FIRST 15 MIN: Performed by: ORTHOPAEDIC SURGERY

## 2019-04-01 PROCEDURE — C1713 ANCHOR/SCREW BN/BN,TIS/BN: HCPCS | Performed by: ORTHOPAEDIC SURGERY

## 2019-04-01 PROCEDURE — C1776 JOINT DEVICE (IMPLANTABLE): HCPCS | Performed by: ORTHOPAEDIC SURGERY

## 2019-04-01 PROCEDURE — 6370000000 HC RX 637 (ALT 250 FOR IP): Performed by: ORTHOPAEDIC SURGERY

## 2019-04-01 PROCEDURE — 6360000002 HC RX W HCPCS: Performed by: ORTHOPAEDIC SURGERY

## 2019-04-01 PROCEDURE — 88305 TISSUE EXAM BY PATHOLOGIST: CPT

## 2019-04-01 PROCEDURE — 6360000002 HC RX W HCPCS: Performed by: NURSE ANESTHETIST, CERTIFIED REGISTERED

## 2019-04-01 PROCEDURE — 97161 PT EVAL LOW COMPLEX 20 MIN: CPT

## 2019-04-01 DEVICE — IMPLANTABLE DEVICE: Type: IMPLANTABLE DEVICE | Site: KNEE | Status: FUNCTIONAL

## 2019-04-01 DEVICE — NEXGEN PRECOAT STEMMED TIBIAL PLATE SZ 3: Type: IMPLANTABLE DEVICE | Site: KNEE | Status: FUNCTIONAL

## 2019-04-01 DEVICE — CEMENT BNE 40GM HI VISC RADPQ FOR REV SURG: Type: IMPLANTABLE DEVICE | Site: KNEE | Status: FUNCTIONAL

## 2019-04-01 DEVICE — COMPONENT FEM SZ E R KNEE ZMLY PC CEM PRI PRESSFIT POST: Type: IMPLANTABLE DEVICE | Site: KNEE | Status: FUNCTIONAL

## 2019-04-01 DEVICE — NEXGEN ALL-POLY PATELLA 29MM: Type: IMPLANTABLE DEVICE | Site: KNEE | Status: FUNCTIONAL

## 2019-04-01 RX ORDER — MORPHINE SULFATE 2 MG/ML
4 INJECTION, SOLUTION INTRAMUSCULAR; INTRAVENOUS
Status: DISCONTINUED | OUTPATIENT
Start: 2019-04-01 | End: 2019-04-02 | Stop reason: HOSPADM

## 2019-04-01 RX ORDER — NICOTINE POLACRILEX 4 MG
15 LOZENGE BUCCAL PRN
Status: DISCONTINUED | OUTPATIENT
Start: 2019-04-01 | End: 2019-04-02 | Stop reason: HOSPADM

## 2019-04-01 RX ORDER — MORPHINE SULFATE 2 MG/ML
1 INJECTION, SOLUTION INTRAMUSCULAR; INTRAVENOUS EVERY 5 MIN PRN
Status: DISCONTINUED | OUTPATIENT
Start: 2019-04-01 | End: 2019-04-01 | Stop reason: HOSPADM

## 2019-04-01 RX ORDER — OXYCODONE HYDROCHLORIDE AND ACETAMINOPHEN 5; 325 MG/1; MG/1
2 TABLET ORAL EVERY 4 HOURS PRN
Status: DISCONTINUED | OUTPATIENT
Start: 2019-04-01 | End: 2019-04-02 | Stop reason: HOSPADM

## 2019-04-01 RX ORDER — DEXTROSE MONOHYDRATE 25 G/50ML
12.5 INJECTION, SOLUTION INTRAVENOUS PRN
Status: DISCONTINUED | OUTPATIENT
Start: 2019-04-01 | End: 2019-04-02 | Stop reason: HOSPADM

## 2019-04-01 RX ORDER — SODIUM CHLORIDE 450 MG/100ML
INJECTION, SOLUTION INTRAVENOUS CONTINUOUS
Status: DISCONTINUED | OUTPATIENT
Start: 2019-04-01 | End: 2019-04-02 | Stop reason: HOSPADM

## 2019-04-01 RX ORDER — INSULIN GLARGINE 100 [IU]/ML
0.25 INJECTION, SOLUTION SUBCUTANEOUS NIGHTLY
Status: DISCONTINUED | OUTPATIENT
Start: 2019-04-01 | End: 2019-04-02 | Stop reason: HOSPADM

## 2019-04-01 RX ORDER — OXYCODONE HYDROCHLORIDE AND ACETAMINOPHEN 5; 325 MG/1; MG/1
1 TABLET ORAL EVERY 4 HOURS PRN
Status: DISCONTINUED | OUTPATIENT
Start: 2019-04-01 | End: 2019-04-02 | Stop reason: HOSPADM

## 2019-04-01 RX ORDER — PRAVASTATIN SODIUM 40 MG
40 TABLET ORAL NIGHTLY
Status: DISCONTINUED | OUTPATIENT
Start: 2019-04-01 | End: 2019-04-02 | Stop reason: HOSPADM

## 2019-04-01 RX ORDER — ONDANSETRON 2 MG/ML
4 INJECTION INTRAMUSCULAR; INTRAVENOUS
Status: DISCONTINUED | OUTPATIENT
Start: 2019-04-01 | End: 2019-04-01 | Stop reason: HOSPADM

## 2019-04-01 RX ORDER — OXYCODONE HYDROCHLORIDE 5 MG/1
5 TABLET ORAL PRN
Status: DISCONTINUED | OUTPATIENT
Start: 2019-04-01 | End: 2019-04-01 | Stop reason: HOSPADM

## 2019-04-01 RX ORDER — SODIUM CHLORIDE 0.9 % (FLUSH) 0.9 %
10 SYRINGE (ML) INJECTION PRN
Status: DISCONTINUED | OUTPATIENT
Start: 2019-04-01 | End: 2019-04-01 | Stop reason: HOSPADM

## 2019-04-01 RX ORDER — MEPERIDINE HYDROCHLORIDE 25 MG/ML
12.5 INJECTION INTRAMUSCULAR; INTRAVENOUS; SUBCUTANEOUS EVERY 5 MIN PRN
Status: DISCONTINUED | OUTPATIENT
Start: 2019-04-01 | End: 2019-04-01 | Stop reason: HOSPADM

## 2019-04-01 RX ORDER — FENTANYL CITRATE 50 UG/ML
25 INJECTION, SOLUTION INTRAMUSCULAR; INTRAVENOUS EVERY 5 MIN PRN
Status: DISCONTINUED | OUTPATIENT
Start: 2019-04-01 | End: 2019-04-01 | Stop reason: HOSPADM

## 2019-04-01 RX ORDER — PROPOFOL 10 MG/ML
INJECTION, EMULSION INTRAVENOUS PRN
Status: DISCONTINUED | OUTPATIENT
Start: 2019-04-01 | End: 2019-04-01 | Stop reason: SDUPTHER

## 2019-04-01 RX ORDER — SODIUM CHLORIDE 0.9 % (FLUSH) 0.9 %
10 SYRINGE (ML) INJECTION PRN
Status: DISCONTINUED | OUTPATIENT
Start: 2019-04-01 | End: 2019-04-02 | Stop reason: HOSPADM

## 2019-04-01 RX ORDER — EPHEDRINE SULFATE/0.9% NACL/PF 50 MG/5 ML
SYRINGE (ML) INTRAVENOUS PRN
Status: DISCONTINUED | OUTPATIENT
Start: 2019-04-01 | End: 2019-04-01 | Stop reason: SDUPTHER

## 2019-04-01 RX ORDER — SODIUM CHLORIDE 0.9 % (FLUSH) 0.9 %
10 SYRINGE (ML) INJECTION EVERY 12 HOURS SCHEDULED
Status: DISCONTINUED | OUTPATIENT
Start: 2019-04-01 | End: 2019-04-01 | Stop reason: HOSPADM

## 2019-04-01 RX ORDER — ONDANSETRON 2 MG/ML
4 INJECTION INTRAMUSCULAR; INTRAVENOUS EVERY 6 HOURS PRN
Status: DISCONTINUED | OUTPATIENT
Start: 2019-04-01 | End: 2019-04-02 | Stop reason: HOSPADM

## 2019-04-01 RX ORDER — CELECOXIB 200 MG/1
200 CAPSULE ORAL EVERY 24 HOURS
Status: DISCONTINUED | OUTPATIENT
Start: 2019-04-02 | End: 2019-04-02 | Stop reason: HOSPADM

## 2019-04-01 RX ORDER — LIDOCAINE HYDROCHLORIDE 20 MG/ML
INJECTION, SOLUTION EPIDURAL; INFILTRATION; INTRACAUDAL; PERINEURAL PRN
Status: DISCONTINUED | OUTPATIENT
Start: 2019-04-01 | End: 2019-04-01 | Stop reason: SDUPTHER

## 2019-04-01 RX ORDER — FENTANYL CITRATE 50 UG/ML
INJECTION, SOLUTION INTRAMUSCULAR; INTRAVENOUS PRN
Status: DISCONTINUED | OUTPATIENT
Start: 2019-04-01 | End: 2019-04-01 | Stop reason: SDUPTHER

## 2019-04-01 RX ORDER — CLONAZEPAM 1 MG/1
1 TABLET ORAL DAILY
Status: DISCONTINUED | OUTPATIENT
Start: 2019-04-02 | End: 2019-04-02

## 2019-04-01 RX ORDER — DEXTROSE MONOHYDRATE 50 MG/ML
100 INJECTION, SOLUTION INTRAVENOUS PRN
Status: DISCONTINUED | OUTPATIENT
Start: 2019-04-01 | End: 2019-04-02 | Stop reason: HOSPADM

## 2019-04-01 RX ORDER — DOCUSATE SODIUM 100 MG/1
100 CAPSULE, LIQUID FILLED ORAL 2 TIMES DAILY
Status: DISCONTINUED | OUTPATIENT
Start: 2019-04-01 | End: 2019-04-02 | Stop reason: HOSPADM

## 2019-04-01 RX ORDER — SODIUM CHLORIDE 0.9 % (FLUSH) 0.9 %
10 SYRINGE (ML) INJECTION EVERY 12 HOURS SCHEDULED
Status: DISCONTINUED | OUTPATIENT
Start: 2019-04-01 | End: 2019-04-02 | Stop reason: HOSPADM

## 2019-04-01 RX ORDER — OXYCODONE HYDROCHLORIDE 5 MG/1
10 TABLET ORAL PRN
Status: DISCONTINUED | OUTPATIENT
Start: 2019-04-01 | End: 2019-04-01 | Stop reason: HOSPADM

## 2019-04-01 RX ORDER — CELECOXIB 200 MG/1
200 CAPSULE ORAL ONCE
Status: COMPLETED | OUTPATIENT
Start: 2019-04-01 | End: 2019-04-01

## 2019-04-01 RX ORDER — OXYCODONE HYDROCHLORIDE 5 MG/1
10 TABLET ORAL ONCE
Status: COMPLETED | OUTPATIENT
Start: 2019-04-01 | End: 2019-04-01

## 2019-04-01 RX ORDER — MORPHINE SULFATE 2 MG/ML
2 INJECTION, SOLUTION INTRAMUSCULAR; INTRAVENOUS EVERY 5 MIN PRN
Status: DISCONTINUED | OUTPATIENT
Start: 2019-04-01 | End: 2019-04-01 | Stop reason: HOSPADM

## 2019-04-01 RX ORDER — FENTANYL CITRATE 50 UG/ML
50 INJECTION, SOLUTION INTRAMUSCULAR; INTRAVENOUS EVERY 5 MIN PRN
Status: DISCONTINUED | OUTPATIENT
Start: 2019-04-01 | End: 2019-04-01 | Stop reason: HOSPADM

## 2019-04-01 RX ORDER — SODIUM CHLORIDE 9 MG/ML
INJECTION, SOLUTION INTRAVENOUS CONTINUOUS
Status: DISCONTINUED | OUTPATIENT
Start: 2019-04-01 | End: 2019-04-01

## 2019-04-01 RX ORDER — MORPHINE SULFATE 2 MG/ML
2 INJECTION, SOLUTION INTRAMUSCULAR; INTRAVENOUS
Status: DISCONTINUED | OUTPATIENT
Start: 2019-04-01 | End: 2019-04-02 | Stop reason: HOSPADM

## 2019-04-01 RX ADMIN — FENTANYL CITRATE 25 MCG: 50 INJECTION, SOLUTION INTRAMUSCULAR; INTRAVENOUS at 16:06

## 2019-04-01 RX ADMIN — FENTANYL CITRATE 25 MCG: 50 INJECTION INTRAMUSCULAR; INTRAVENOUS at 14:27

## 2019-04-01 RX ADMIN — ONDANSETRON 4 MG: 2 INJECTION INTRAMUSCULAR; INTRAVENOUS at 23:49

## 2019-04-01 RX ADMIN — SODIUM CHLORIDE: 4.5 INJECTION, SOLUTION INTRAVENOUS at 19:01

## 2019-04-01 RX ADMIN — PRAVASTATIN SODIUM 40 MG: 40 TABLET ORAL at 21:40

## 2019-04-01 RX ADMIN — MORPHINE SULFATE 4 MG: 2 INJECTION, SOLUTION INTRAMUSCULAR; INTRAVENOUS at 20:15

## 2019-04-01 RX ADMIN — PROPOFOL 150 MG: 10 INJECTION, EMULSION INTRAVENOUS at 14:11

## 2019-04-01 RX ADMIN — CELECOXIB 200 MG: 200 CAPSULE ORAL at 11:33

## 2019-04-01 RX ADMIN — Medication 20 MG: at 15:05

## 2019-04-01 RX ADMIN — LIDOCAINE HYDROCHLORIDE 40 MG: 20 INJECTION, SOLUTION EPIDURAL; INFILTRATION; INTRACAUDAL; PERINEURAL at 14:11

## 2019-04-01 RX ADMIN — DOCUSATE SODIUM 100 MG: 100 CAPSULE, LIQUID FILLED ORAL at 21:41

## 2019-04-01 RX ADMIN — FAMOTIDINE 20 MG: 10 INJECTION, SOLUTION INTRAVENOUS at 21:41

## 2019-04-01 RX ADMIN — OXYCODONE HYDROCHLORIDE 10 MG: 5 TABLET ORAL at 11:33

## 2019-04-01 RX ADMIN — FAMOTIDINE 20 MG: 10 INJECTION, SOLUTION INTRAVENOUS at 11:33

## 2019-04-01 RX ADMIN — SODIUM CHLORIDE: 9 INJECTION, SOLUTION INTRAVENOUS at 14:05

## 2019-04-01 RX ADMIN — TRANEXAMIC ACID 1000 MG: 1 INJECTION, SOLUTION INTRAVENOUS at 13:00

## 2019-04-01 RX ADMIN — HYDROMORPHONE HYDROCHLORIDE 1 MG: 1 INJECTION, SOLUTION INTRAMUSCULAR; INTRAVENOUS; SUBCUTANEOUS at 14:04

## 2019-04-01 RX ADMIN — OXYCODONE HYDROCHLORIDE AND ACETAMINOPHEN 2 TABLET: 5; 325 TABLET ORAL at 23:05

## 2019-04-01 RX ADMIN — FENTANYL CITRATE 50 MCG: 50 INJECTION, SOLUTION INTRAMUSCULAR; INTRAVENOUS at 15:55

## 2019-04-01 RX ADMIN — Medication 2 G: at 12:30

## 2019-04-01 RX ADMIN — MORPHINE SULFATE 4 MG: 2 INJECTION, SOLUTION INTRAMUSCULAR; INTRAVENOUS at 17:29

## 2019-04-01 RX ADMIN — SODIUM CHLORIDE: 9 INJECTION, SOLUTION INTRAVENOUS at 11:32

## 2019-04-01 RX ADMIN — OXYCODONE HYDROCHLORIDE AND ACETAMINOPHEN 1 TABLET: 5; 325 TABLET ORAL at 19:01

## 2019-04-01 RX ADMIN — Medication 2 G: at 20:15

## 2019-04-01 ASSESSMENT — PULMONARY FUNCTION TESTS
PIF_VALUE: 13
PIF_VALUE: 15
PIF_VALUE: 15
PIF_VALUE: 0
PIF_VALUE: 0
PIF_VALUE: 13
PIF_VALUE: 14
PIF_VALUE: 14
PIF_VALUE: 13
PIF_VALUE: 1
PIF_VALUE: 14
PIF_VALUE: 13
PIF_VALUE: 15
PIF_VALUE: 4
PIF_VALUE: 14
PIF_VALUE: 0
PIF_VALUE: 13
PIF_VALUE: 15
PIF_VALUE: 13
PIF_VALUE: 14
PIF_VALUE: 15
PIF_VALUE: 13
PIF_VALUE: 1
PIF_VALUE: 14
PIF_VALUE: 3
PIF_VALUE: 20
PIF_VALUE: 2
PIF_VALUE: 13
PIF_VALUE: 1
PIF_VALUE: 14
PIF_VALUE: 0
PIF_VALUE: 14
PIF_VALUE: 19
PIF_VALUE: 14
PIF_VALUE: 1
PIF_VALUE: 3
PIF_VALUE: 13
PIF_VALUE: 1
PIF_VALUE: 13
PIF_VALUE: 14
PIF_VALUE: 0
PIF_VALUE: 14
PIF_VALUE: 13
PIF_VALUE: 14
PIF_VALUE: 14
PIF_VALUE: 15
PIF_VALUE: 14
PIF_VALUE: 13
PIF_VALUE: 14
PIF_VALUE: 13
PIF_VALUE: 18
PIF_VALUE: 3
PIF_VALUE: 13
PIF_VALUE: 3
PIF_VALUE: 14
PIF_VALUE: 14
PIF_VALUE: 15
PIF_VALUE: 14
PIF_VALUE: 15
PIF_VALUE: 1
PIF_VALUE: 14
PIF_VALUE: 15
PIF_VALUE: 21
PIF_VALUE: 14
PIF_VALUE: 13
PIF_VALUE: 14
PIF_VALUE: 14
PIF_VALUE: 15
PIF_VALUE: 14
PIF_VALUE: 13
PIF_VALUE: 14
PIF_VALUE: 14

## 2019-04-01 ASSESSMENT — PAIN DESCRIPTION - PAIN TYPE
TYPE: SURGICAL PAIN
TYPE: ACUTE PAIN;SURGICAL PAIN
TYPE: SURGICAL PAIN
TYPE: SURGICAL PAIN
TYPE: ACUTE PAIN;SURGICAL PAIN

## 2019-04-01 ASSESSMENT — PAIN DESCRIPTION - FREQUENCY
FREQUENCY: CONTINUOUS

## 2019-04-01 ASSESSMENT — PAIN DESCRIPTION - DESCRIPTORS
DESCRIPTORS: ACHING
DESCRIPTORS: TENDER
DESCRIPTORS: ACHING;THROBBING
DESCRIPTORS: ACHING;TINGLING
DESCRIPTORS: THROBBING
DESCRIPTORS: THROBBING
DESCRIPTORS: ACHING
DESCRIPTORS: THROBBING
DESCRIPTORS: ACHING;TINGLING

## 2019-04-01 ASSESSMENT — PAIN DESCRIPTION - LOCATION
LOCATION: KNEE

## 2019-04-01 ASSESSMENT — PAIN SCALES - GENERAL
PAINLEVEL_OUTOF10: 6
PAINLEVEL_OUTOF10: 1
PAINLEVEL_OUTOF10: 4
PAINLEVEL_OUTOF10: 5
PAINLEVEL_OUTOF10: 8
PAINLEVEL_OUTOF10: 4
PAINLEVEL_OUTOF10: 0
PAINLEVEL_OUTOF10: 0
PAINLEVEL_OUTOF10: 10
PAINLEVEL_OUTOF10: 6
PAINLEVEL_OUTOF10: 10
PAINLEVEL_OUTOF10: 7

## 2019-04-01 ASSESSMENT — PAIN DESCRIPTION - ORIENTATION
ORIENTATION: RIGHT

## 2019-04-01 ASSESSMENT — PAIN - FUNCTIONAL ASSESSMENT
PAIN_FUNCTIONAL_ASSESSMENT: 0-10
PAIN_FUNCTIONAL_ASSESSMENT: PREVENTS OR INTERFERES SOME ACTIVE ACTIVITIES AND ADLS

## 2019-04-01 ASSESSMENT — PAIN DESCRIPTION - ONSET
ONSET: ON-GOING

## 2019-04-01 ASSESSMENT — PAIN DESCRIPTION - PROGRESSION
CLINICAL_PROGRESSION: NOT CHANGED
CLINICAL_PROGRESSION: NOT CHANGED
CLINICAL_PROGRESSION: GRADUALLY WORSENING
CLINICAL_PROGRESSION: GRADUALLY WORSENING
CLINICAL_PROGRESSION: GRADUALLY IMPROVING

## 2019-04-01 ASSESSMENT — ENCOUNTER SYMPTOMS: SHORTNESS OF BREATH: 0

## 2019-04-01 ASSESSMENT — LIFESTYLE VARIABLES: SMOKING_STATUS: 1

## 2019-04-01 NOTE — OP NOTE
OPERATIVE REPORT              . Patient:  Kam Otoole    YOB: 1940  Date of Service:  4/1/2019   Location:  [address] [place] [address] at [place]      Preoperative Diagnosis:    Degenerative Arthritis Right  Knee 715.16    Postoperative Diagnosis:  Same. Procedure:   Right Total Knee Replacement    Anesthesia:   General     Blood Loss:  Minimal.    Tourniquet Time:    46 minutes. Assistant: Karin Torres, [facility]    Modifier:    CLINICAL NOTE: The patient is a 77y.o. year old female with degenerative arthritic changes and pain of the Right knee. The patient comes in for Right total knee replacement. OPERATIVE NOTE:  The patient was brought to the operating room. Once anesthesia was obtained and intravenous antibiotics given, the knee was prepped and draped in a sterile fashion. The leg was exsanguinated. The tourniquet was placed to 350 mmHg around the thigh. An anterior incision was made. Skin and subcutaneous tissue were divided down to the extensor mechanism. A medial parapatellar arthrotomy was performed and the patella was pushed laterally. Cheilectomy, synovectomy, and meniscectomy were performed at this time. The distal end of the femur was exposed using the I-ASSIST gyroscopic dead reckoning technology the zero mechanical axis was identified. A distal cut with 2 degrees of flexion was then made on the distal femur. The proximal tibia was exposed and about 10 mm proximal tibia resected parallel to the ankle joint. The extension gap was checked and stabilized at this time. The distal end of the femur was then exposed, measured, and cut to accept a E femoral component. Anterior and posterior condylar cuts, chamfer cuts, and box cut were made for the posterior stablilized implant. Flexion and extension gaps were checked and stablilized. At this time, the proximal tibia was exposed, measured, and cut to accept the 3 tibial tray.   Trial reduction with the 3 tibial tray, the E femoral component, and the 12 mm PS tibial insertion was done. The knee came to full extension, excellent flexion, and the patella tracked well. A lateral release was not performed. The patella was then measured and cut to accept a 29 mm all poly 3-PEG patella and a trial mode tracked well. All trial implants were removed. The ends of the bone were irrigated off and dried. Cement was mixed on the back table and then in the order of the tibia, femur, and patella, the 3 tibia, the E femoral component, and the 29 mm all Poly 3-PEG patella was cemented into place. The cement hardened and a trial reduction with a 12 mm PS tibial tray trial was inserted and then the real 12 mm PS was snapped into place. The knee came to full extension, excellent flexion, good overall stability, and the patella tracked well. The wound was irrigated out. Hemostasis was obtained. The wound was injected with a   mixture of 102 mL containing 90 cc ropivacaine . 5%, 10 mg  Morphine, 40 mg depomedrol, and 750 mg cefuroxime. It was also bathed for 5 minutes with 3 g of tranexamic acid. The wound was closed in layers. A dressing was applied and the patient was brought to the recovery room in good condition. Tammy Bridges.  Kinjal Malave M.D.

## 2019-04-01 NOTE — ANESTHESIA POSTPROCEDURE EVALUATION
[facility] Department of Anesthesiology  Post-Anesthesia Note       Name:  Dominique Becker                                  Age:  77 y.o. MRN:  [id number]     Last Vitals & Oxygen Saturation: /74   Pulse 73   Temp 97.4 °F (36.3 °C) (Oral)   Resp 18   Ht 5' 3\" (1.6 m)   Wt 153 lb 1.8 oz (69.4 kg)   LMP  (Exact Date)   SpO2 95%   BMI 27.12 kg/m²   Patient Vitals for the past 4 hrs:   BP Temp Temp src Pulse Resp SpO2   04/01/19 1650 131/74 97.4 °F (36.3 °C) Oral 73 18 95 %   04/01/19 1640 -- -- -- 70 14 98 %   04/01/19 1635 (!) 145/83 -- -- 77 12 100 %   04/01/19 1630 -- -- -- 77 14 98 %   04/01/19 1625 -- -- -- 78 11 97 %   04/01/19 1620 -- -- -- 70 (!) 6 97 %   04/01/19 1615 (!) 143/80 -- -- 77 9 96 %   04/01/19 1610 -- 98.2 °F (36.8 °C) Temporal -- -- --   04/01/19 1605 -- -- -- 75 (!) 7 95 %   04/01/19 1600 (!) 162/80 -- -- 71 12 100 %   04/01/19 1558 -- -- -- 74 13 100 %   04/01/19 1555 -- -- -- 77 10 99 %   04/01/19 1550 (!) 152/88 -- -- 81 8 97 %   04/01/19 1545 (!) 163/82 -- -- 80 10 96 %   04/01/19 1540 (!) 158/88 -- -- 83 11 96 %   04/01/19 1537 127/68 -- -- 81 11 97 %   04/01/19 1535 127/68 -- -- 79 10 96 %   04/01/19 1532 (!) 93/57 -- -- 74 8 94 %   04/01/19 1529 -- 98.9 °F (37.2 °C) Temporal -- -- --       Level of consciousness:  Awake, alert to baseline    Respiratory: Respirations easy, no distress. Stable. Cardiovascular: Hemodynamically stable. Hydration: Adequate. PONV: Adequately managed. Post-op pain: Adequately controlled. Post-op assessment: Tolerated anesthetic well without complication. Complications:  None.     Aminata Guzman MD  April 1, 2019   5:17 PM

## 2019-04-01 NOTE — DISCHARGE INSTR - COC
[address] [facility] Form    Patient Name:  Robby Velasquez  : 1940    MRN:  [id number]    Admit date:  2019  Discharge date:  19    Code Status Order: Full  Advance Directives: No    Admitting Physician: Kim Claudio MD  PCP: Gilmar Herring MD    Discharging Nurse: [facility] Unit/Room#: 6752  Discharging Unit Phone Number: [phone]    Emergency Contact:        Past Surgical History:  Past Surgical History:   Procedure Laterality Date   [address]  2007    DIVERTICULITIS, REMOVED DIVERTICULA POCKETS    SKIN CANCER EXCISION Left 2019    face       Immunization History:   Immunization History   Administered Date(s) Administered    Influenza A (H1N1) Vaccine IM 2009    Influenza Vaccine, unspecified formulation 2002, 2003, 2004, 10/11/2005, 2006, 10/15/2007, 2008, 2010, 10/17/2011    Influenza, High Dose (Fluzone 65 yrs and older) 2012, 2014, 2015, 10/18/2016, 10/03/2017, 10/31/2018    Influenza, Genetta Kiang, 3 Years and older, IM (Fluzone 3 yrs and older or Afluria 5 yrs and older) 10/15/2013    Pneumococcal 13-valent Conjugate (Iqvgdot23) 2016    Pneumococcal Polysaccharide ([id number]) 2003, 2012    Td Vaccine >6yo [facility] 1997, 2009    Tdap (Boostrix, Adacel) 2012    Zoster Live (Zostavax) 2014       Active Problems:  Active Problems:    * No active hospital problems. *  Resolved Problems:    * No resolved hospital problems.  *      Isolation/Infection:       Nurse Assessment:  Last Vital Signs:/75   Pulse 57   Resp 20   Ht 5' 3\" (1.6 m)   Wt 153 lb 1.8 oz (69.4 kg)   LMP  (Exact Date)   SpO2 96%   BMI 27.12 kg/m²   Last documented pain score (0-10 scale): Pain Level: 1  Last Weight:   Wt Readings from Last 1 Encounters:   19 153 lb 1.8 oz (69.4 kg)     Mental Status:  oriented and alert     IV Access:  - None    Nursing Mobility/ADLs:  Walking   Assisted  Transfer  Assisted  Bathing  Assisted  Dressing  Assisted  Toileting  Assisted  Feeding  Independent  Med Admin  Independent  Med Delivery   whole    Wound Care Documentation and Therapy:  Keep glued Prineo dressing clean and intact. DO NOT remove. Prineo is waterproof for showering. Doctor will evaluated dressing for removal at office visit 2 weeks after surgery        Elimination:  Urinary Catheter: None   Colostomy/Ileostomy: No  Continence:   · Bowel: Yes  · Bladder: Yes  Date of Last BM: 4/1/19    Intake/Output Summary (Last 24 hours)   No intake or output data in the 24 hours ending 04/01/19 6390  Safety Concerns: At Risk for Falls    Impairments/Disabilities:      Vision    Nutrition Therapy:  Current Nutrition Therapy: General  Routes of Feeding: Oral  Liquids: No Restrictions  Daily Fluid Restriction: no  Last Modified Barium Swallow with Video (Video Swallowing Test): not done    Treatments at the Time of Hospital Discharge:   Respiratory Treatments:   Oxygen Therapy:  is not on home oxygen therapy.   Ventilator:    - No ventilator support    Lab orders for discharge:        Rehab Therapies: Physical Therapy, Occupational Therapy   Weight Bearing Status/Restrictions: No weight bearing restirctions  Other Medical Equipment (for information only, NOT a DME order):  Rolling walker  Other Treatments: ASA 81mg twice at day for 14 days for DVT prophylaxis, bilateral FAHEEM hose for 6 weeks after surgery    Patient's personal belongings (please select all that are sent with patient):  Glasses, Dentures upper    RN SIGNATURE:  Electronically signed by Remington Santos RN on 4/2/19 at 8:18 AM    PHYSICIAN SECTION    Prognosis: Good    Condition at Discharge: Stable    Rehab Potential (if transferring to Rehab): Good    Physician Certification: I certify the above orders, information, and transfer of Yamilex Boyle is necessary for the continuing treatment checking with your doctor.  Resume previous medications as instructed by your doctor. You will be on aspirin or Eliquis  for 14 days only. Stay off other anti-inflammatory medications (except Celebrex)  Call Your Doctor If:  [facility] You have increased pain not controlled by medications.  Excessive swelling in your ankle.  You develop numbness, tingling, or decreased movement.  You have a fever greater than 100 degrees for a day or over 101 degrees at any one time.  Your wound becomes more reddened, starts draining, or opens.  If you fall. You have any questions about your recovery. Inform your family doctor/dentist or any other doctor who cares for you in the future that you have a joint replacement. You may need antibiotics for dental or surgical procedures if there is any evidence of infection present. ? If you have required the use of insulin to control your blood sugar after surgery, follow up with your family doctor. ? Call your surgeons office to schedule your appointment to be seen after surgery. ? Make your appointment to continue physical therapy per doctors orders. ?  Smoking cessation assistance can be obtained from your family doctor or by calling [place] @ [phone]    _______________________________   _____   _______________________  ____                Patient Signature              Date      Witness                               Date

## 2019-04-01 NOTE — PROGRESS NOTES
Physical Therapy    Facility/Department: [address] ORTHOPEDICS  Initial Assessment  This note serves as patient discharge summary if pt discharges prior to next PT visit    NAME: Renaldo Torres  : 1940  MRN: [id number]    Date of Service: 2019    Discharge Recommendations:  Continue to assess pending progress, Home with Home health PT, Patient would benefit from continued therapy after discharge, S Level 1, 24 hour supervision or assist   Keely Paulino scored a  on the AM-PAC short mobility form. Current research shows that an AM-PAC score of 18 or greater is typically associated with a discharge to the patient's home setting. Based on the patients AM-PAC score and their current functional mobility deficits, it is recommended that the patient have 2-3 sessions per week of Physical Therapy at d/c to increase the patients independence. Assessment   Body structures, Functions, Activity limitations: Decreased functional mobility ; Decreased ROM; Decreased strength; Increased Pain  Assessment: Prior to elective R TKR, pt living in 2 level home with spouse with 2 (platform) ROSANNA. Pt was independent with mobility, ADLs and driving. At 19 session, pt was SBA for bed mobility and CGA for transfers, requiring cues for proper hand placement. Pt ambulated 5 ft from bed to chair using RW with CGA and verbal cues for directing to chair. Pt tolerated treatment well and shows sufficient strength in B LE. Plan on d/c to home tomorrow with initial  family support and home PT level 1 if PT goals are met but will cont to assess as needed.    Treatment Diagnosis: Impaired functional mobility  Prognosis: Good  Decision Making: Low Complexity  History: See above  Clinical Presentation: Stable  Barriers to Learning: None  REQUIRES PT FOLLOW UP: Yes  Activity Tolerance  Activity Tolerance: Patient Tolerated treatment well;Patient limited by pain       Patient Diagnosis(es): The encounter diagnosis was Arthritis of Equipment: Rolling walker, Cane  ADL Assistance: Independent  Homemaking Assistance: Independent  Ambulation Assistance: Independent  Transfer Assistance: Independent  Active : Yes  Mode of Transportation: Car  Additional Comments: plans home with family assist and home PT  Objective  AROM RLE (degrees)  RLE AROM: WFL(except for knee grossly 5-50 degrees)  AROM LLE (degrees)  LLE AROM : WNL  Strength RLE  Comment: functionally 3+/5  Strength LLE  Strength LLE: WNL  Bed mobility  Supine to Sit: Stand by assistance  Sit to Supine: Unable to assess(pt left up in chair)  Transfers  Sit to Stand: Contact guard assistance(multiple cues required for hand placement, stand up to RW)  Stand to sit: Contact guard assistance(cues required for hand placement, sit down from RW)  Ambulation  Ambulation?: Yes  Ambulation 1  Surface: level tile  Device: Rolling Walker  Assistance: Contact guard assistance  Quality of Gait: decreased step length on left, decreased stance time on right, step to pattern, use of RW for UE support  Distance: 5' from bed to chair     Plan   Plan  Times per week: 1-4 visits as needed  Current Treatment Recommendations: Strengthening, ROM, Functional Mobility Training, Gait Training, Transfer Training, Stair training, Home Exercise Program, Safety Education & Training, Patient/Caregiver Education & Training  Safety Devices  Type of devices: Call light within reach, Chair alarm in place, Gait belt, Left in chair, Nurse notified(RN Valentina Sandoval notified)    AM-PAC Score  AM-PAC Inpatient Mobility Raw Score : 19  AM-PAC Inpatient T-Scale Score : 45.44  Mobility Inpatient CMS 0-100% Score: 41.77  Mobility Inpatient CMS G-Code Modifier : CK       Goals  Short term goals  Time Frame for Short term goals: By acute d/c  Short term goal 1: Gait 48' w RW and SBA  Short term goal 2: Climb 2 platform steps w CGA/cues  Short term goal 3: Transfers SBA w min cues for hand placement   Short term goal 4: Tolerates 5-10 reps TKR initial HEP w min cues  Patient Goals   Patient goals : \"to go home\"     Therapy Time   Individual Concurrent Group Co-treatment   Time In 8600         Time Out 1720         Minutes [address], [facility]      I attest that I was present for and made a skilled & mindful clinical judgement during the evaluation and/or treatment of this patient on 4/1/2019  Electronically signed by Mile Hernández, [address] [place] [address] Drive on 4/1/2019 at 5:42 PM

## 2019-04-01 NOTE — BRIEF OP NOTE
Brief Postoperative Note  ______________________________________________________________    Patient: Harles Duverney  YOB: 1940  MRN: [id number]  Date of Procedure: 4/1/2019    Pre-Op Diagnosis: RIGHT ARTHRITIS KNEE    Post-Op Diagnosis: Same       Procedure(s):  RIGHT TOTAL KNEE REPLACEMENT    Anesthesia: General    Surgeon(s):  Huey Alexis MD    Assistant: Quinn    Estimated Blood Loss (mL): less than 50     Complications: None    Specimens:   ID Type Source Tests Collected by Time Destination   A : A) Right knee bone and tissue Bone Joint, Knee SURGICAL PATHOLOGY Huey Alexis MD 4/1/2019 1436        Implants:  Implant Name Type Inv.  Item Serial No.  Lot No. LRB No. Used   CEMENT BONE R 1X40 US Cement CEMENT BONE R 1X40   [employer] [id number] Right 2   IMPL KNEE PATELLA NXGN ALL POLY 8.0X29MM Knee IMPL KNEE PATELLA NXGN ALL POLY 8.0X29MM  [employer] [id number] Right 1   IMPL KNEE FEM COMP LPS FLX GSF OPT RT SZ Knee IMPL KNEE FEM COMP LPS FLX GSF OPT RT SZ  [employer] [id number] Right 1   IMPL KNEE TIB STEM SZ 3 [id number] Knee IMPL KNEE TIB STEM SZ 3 [id number]  [employer] [id number] Right 1   IMPL KNEE SURF ARTIC 3 4 12MM Knee IMPL KNEE SURF ARTIC 3 4 12MM  [employer] [id number] Right 1         Drains: * No LDAs found *    Findings: OA R knee    Huey Alexis MD  Date: 4/1/2019  Time: 3:18 PM

## 2019-04-01 NOTE — H&P
Preoperative H&P Update     The patient's History and Physical in the medical record   dated 4/1/19 was reviewed by me today. I reviewed the HPI, medications, allergies, reason for surgery, diagnosis and treatment plan and there has been no change. Prior to Visit Medications    Medication Sig Taking? Authorizing Provider   clonazePAM (KLONOPIN) 1 MG tablet TAKE ONE-HALF TO ONE TABLET BY MOUTH ONCE NIGHTLY. Yes Deidra Keene DO   pravastatin (PRAVACHOL) 40 MG tablet Take 1 tablet by mouth nightly Yes MICAELA Colon - CNP   celecoxib (CELEBREX) 200 MG capsule TAKE 1 CAPSULE EVERY DAY Yes Medardo Melendez MD   Multiple Vitamins-Minerals (CENTRUM SILVER PO) Take by mouth Yes Historical Provider, MD   Omega-3 Fatty Acids (FISH OIL) 500 MG CAPS Take 500 mg by mouth every morning Yes Historical Provider, MD   vitamin B-12 (CYANOCOBALAMIN) 1000 MCG tablet Take 1,000 mcg by mouth daily Yes Historical Provider, MD   vitamin C (ASCORBIC ACID) 500 MG tablet Take 500 mg by mouth daily Yes Historical Provider, MD   vitamin D (CHOLECALCIFEROL) 1000 UNIT TABS tablet Take 1,000 Units by mouth daily Yes Historical Provider, MD       The patient was evaluated by me today. Physical exam findings for this update include:     Vitals:    04/01/19 1109   BP: 132/75   Pulse: 57   Resp: 20   SpO2: 96%      Airway is intact   Chest: breathing comfortably   Heart: regular rate and rhythm. Examination of the body region where surgery is to be performed shows skin is intact at the operative site.       Renae Ervin MD    Electronically signed by Layla Jefferson MD on 4/1/2019 at 11:31 AM

## 2019-04-01 NOTE — PROGRESS NOTES
Occupational Therapy   Occupational Therapy Initial Assessment  Date: 2019   Patient Name: Rowland Kanner  MRN: [id number]     : 1940    Date of Service: 2019    Assessment: Pt is 77 y.o.  who presents with arthritis of R knee, s/p R TKA 19. Pt is WBAT. PTA pt lives with spouse in two story home, able to live on main floor with 2 ROSANNA. Pt reports independence in self-care, functional mobility, and homemaking responsibilities. Currently, pt presents with ROM/strength in [facility] for self-care and transfers. Pt completed bed mobility with SBA and sit <> stand transfers with CGA with verbal cues. Pt completed functional mobility with RW with CGA, steady with no overt LOB. Pt required assist for LB ADLs. Anticipate pt to require CGA/min A for ADL needs. Anticipate pt to d/c home with 24/7 supervision/assist and home health OT. Discharge Recommendations:  Home with Home health OT, 24 hour supervision or assist, S Level 1     HOME HEALTH CARE: LEVEL 1 STANDARD    - Initial home health evaluation to occur within 24-48 hours, in patient home   - Therapy to evaluate with goal of regaining prior level of functioning   - Therapy to evaluate if patient has [address] needs for personal care    Assessment   Performance deficits / Impairments: Decreased functional mobility ; Decreased strength;Decreased endurance;Decreased ADL status; Decreased balance  Assessment: Pt is 77 y.o.  who presents with arthritis of R knee, s/p R TKA 19. Pt is WBAT. PTA pt lives with spouse in two story home, able to live on main floor with 2 ROSANNA. Pt reports independence in self-care, functional mobility, and homemaking responsibilities. Currently, pt presents with ROM/strength in [facility] for self-care and transfers. Pt completed bed mobility with SBA and sit <> stand transfers with CGA with verbal cues. Pt completed functional mobility with RW with CGA, steady with no overt LOB. Pt required assist for LB ADLs.  Anticipate pt to require CGA/min A for ADL needs. Anticipate pt to d/c home with 24/7 supervision/assist and home health OT. Treatment Diagnosis: impaired func mob, transfers, and ADLs  Prognosis: Good  Decision Making: Medium Complexity  History: PMH: Diverticulitis, Restless legs syndrome, L wrist fx 2017  Exam: ADLs, transfers, func mob, bed mob  Assistance / Modification: CGA for func mob/transfers, CGA/min A for ADLs  Patient Education: Role of OT, POC, safety   REQUIRES OT FOLLOW UP: Yes  Activity Tolerance  Activity Tolerance: Patient Tolerated treatment well  Safety Devices  Safety Devices in place: Yes(TAYLER Mason) aware)  Type of devices: Call light within reach; Chair alarm in place;Gait belt;Left in chair;Nurse notified           Patient Diagnosis(es): The encounter diagnosis was Arthritis of right knee. has a past medical history of Arthritis, Diverticulitis, History of nonmelanoma skin cancer, Hyperlipidemia, Osteoarthritis, Restless legs syndrome, and Wrist fracture, left.   has a past surgical history that includes Colon surgery (2007); Cholecystectomy (1996); and Skin cancer excision (Left, 03/18/2019). Treatment Diagnosis: impaired func mob, transfers, and ADLs      Restrictions  Restrictions/Precautions  Restrictions/Precautions: Fall Risk, Weight Bearing  Lower Extremity Weight Bearing Restrictions  Right Lower Extremity Weight Bearing: Weight Bearing As Tolerated    Subjective   General  Chart Reviewed: Yes  Patient assessed for rehabilitation services?: Yes  Additional Pertinent Hx: Pt is 77 y.o. F who presents with arthritis of R knee, s/p R TKA 4/1/19. Pt is WBAT. PMH: Diverticulitis, Restless legs syndrome, L wrist fx 2017  Family / Caregiver Present: Yes(Daughter )  Referring Practitioner: Maeve Stern MD  Diagnosis: Arthritis of R knee  Subjective  Subjective: Pt met bedside, agreeable for therapy evaluation and OOB activity.    Pain Assessment  Pain Assessment: 0-10  Pain Level: 4  Patient's Stated steady with no overt LOB. Cues for RW management and assist to manage lines     ADL  LE Dressing: (Assist to thread depends and manage over hips )  Additional Comments: PTA pt reports independence in self-care tasks. Anticipate pt to require CGA/min A for LB ADLs, SBA for UB ADLs, setup for grooming, CGA for toileting. Tone RUE  RUE Tone: Normotonic  Tone LUE  LUE Tone: Normotonic  Coordination  Movements Are Fluid And Coordinated: Yes     Bed mobility  Supine to Sit: Stand by assistance  Sit to Supine: Unable to assess(up in chair at end of session)     Transfers  Sit to stand: Contact guard assistance  Stand to sit: Contact guard assistance  Transfer Comments: CGA for sit <> stand from EOB to RW and sit to recliner chair, verbal cues for safe hand placement      Cognition  Overall Cognitive Status: WFL        Sensation  Overall Sensation Status: WFL        LUE AROM (degrees)  LUE AROM : WFL  Left Hand AROM (degrees)  Left Hand AROM: WFL  RUE AROM (degrees)  RUE AROM : WFL  Right Hand AROM (degrees)  Right Hand AROM: WFL     LUE Strength  Gross LUE Strength: WFL  RUE Strength  Gross RUE Strength: WFL          Plan   Plan  Times per week: 1-2  Times per day: Daily  Current Treatment Recommendations: Strengthening, Functional Mobility Training, Endurance Training, Balance Training, Safety Education & Training, Equipment Evaluation, Education, & procurement, Patient/Caregiver Education & Training, Self-Care / ADL    AM-PAC Score    Stew Shahid scored a 21/24 on the AM-[facility] ADL Inpatient form. Current research shows that an AM-PAC score of 18 or greater is typically associated with a discharge to the patient's home setting. Based on the patients AM-PAC score and their current ADL deficits, it is recommended that the patient have 2-3 sessions per week of Occupational Therapy at d/c to increase the patients independence.      AM-PAC Inpatient Daily Activity Raw Score: 21  AM-PAC Inpatient ADL T-Scale Score : 44.27  ADL Inpatient CMS 0-100% Score: 32.79  ADL Inpatient CMS G-Code Modifier : CJ    Goals  Short term goals  Time Frame for Short term goals: prior to d/c  Short term goal 1: Pt will complete functional mobility and transfers with SPV  Short term goal 2: Pt will complete bathing with CGA  Short term goal 3: Pt will complete dressing with CGA  Short term goal 4: Pt will complete toileting with CGA  Short term goal 5: Pt will complete grooming in stance at sink with SBA  Patient Goals   Patient goals : \"to go home tomorrow with my family\"       Therapy Time   Individual Concurrent Group Co-treatment   Time In       1655   Time Out       1718   Minutes       23   Timed Code Treatment Minutes: 8 Minutes(15 min eval)     If pt is discharged prior to next OT session, this note will serve as the discharge summary.     Gerber Alvarez OTR/L #[id number]

## 2019-04-01 NOTE — ANESTHESIA PRE PROCEDURE
Last 3 Encounters:   04/01/19 153 lb 1.8 oz (69.4 kg)   03/21/19 152 lb (68.9 kg)   03/12/19 152 lb 9.6 oz (69.2 kg)     Body mass index is 27.12 kg/m². CBC:   Lab Results   Component Value Date    WBC 7.4 03/06/2019    RBC 5.19 03/06/2019    HGB 15.7 03/06/2019    HCT 48.1 03/06/2019    MCV 92.8 03/06/2019    RDW 14.4 03/06/2019     03/06/2019       CMP:   Lab Results   Component Value Date     03/06/2019    K 4.6 03/06/2019     03/06/2019    CO2 29 03/06/2019    BUN 18 03/06/2019    CREATININE 0.8 03/06/2019    GFRAA >60 03/06/2019    AGRATIO 1.4 11/10/2017    LABGLOM >60 03/06/2019    GLUCOSE 101 03/06/2019    PROT 6.6 11/10/2017    CALCIUM 10.2 03/06/2019    BILITOT 0.5 11/10/2017    ALKPHOS 56 11/10/2017    AST 26 11/10/2017    ALT 14 11/10/2017       POC Tests: No results for input(s): POCGLU, POCNA, POCK, POCCL, POCBUN, POCHEMO, POCHCT in the last 72 hours. Coags:   Lab Results   Component Value Date    PROTIME 10.6 03/06/2019    INR 0.93 03/06/2019    APTT 30.4 03/06/2019       HCG (If Applicable): No results found for: PREGTESTUR, PREGSERUM, HCG, HCGQUANT     ABGs: No results found for: PHART, PO2ART, MFF1NVD, IIQ8GMV, BEART, Z9WXCGAS     Type & Screen (If Applicable):  No results found for: [facility]    Anesthesia Evaluation  Patient summary reviewed and Nursing notes reviewed  Airway: Mallampati: II  TM distance: >3 FB   Neck ROM: full  Mouth opening: > = 3 FB Dental:          Pulmonary:normal exam    (+) current smoker (very light smoker, occasional use)    (-) pneumonia, COPD, asthma, shortness of breath, recent URI and sleep apnea          Patient did not smoke on day of surgery.                  Cardiovascular:Negative CV ROS  Exercise tolerance: good (>4 METS),                     Neuro/Psych:   Negative Neuro/Psych ROS              GI/Hepatic/Renal: Neg GI/Hepatic/Renal ROS            Endo/Other:    (+) : arthritis:., .    (-) diabetes mellitus, hypothyroidism, hyperthyroidism, blood dyscrasia, no electrolyte abnormalities               Abdominal:           Vascular:                                        Anesthesia Plan      general     ASA 2     (Spoke with patient and her daughter)  Induction: intravenous. MIPS: Postoperative opioids intended and Prophylactic antiemetics administered. Anesthetic plan and risks discussed with patient and child/children. Plan discussed with CRNA. This pre-anesthesia assessment may be used as a history and physical.    DOS STAFF ADDENDUM:    Pt seen and examined, chart reviewed (including anesthesia, drug and allergy history). No interval changes to history and physical examination. Anesthetic plan, risks, benefits, alternatives, and personnel involved discussed with patient. Patient verbalized an understanding and agrees to proceed.       José Manuel Fatima MD  April 1, 2019  11:43 AM            José Manuel Fatima MD   4/1/2019

## 2019-04-01 NOTE — PLAN OF CARE
Problem: Cardiac:  Goal: Ability to maintain vital signs within normal range will improve  Description  Ability to maintain vital signs within normal range will improve  Outcome: Ongoing  Note:   Pt's BP was slightly elevated when she arrived to floor. Will monitor. Problem: Nausea/Vomiting:  Goal: Absence of nausea/vomiting  Description  Absence of nausea/vomiting  Outcome: Ongoing  Note:   Pt has had no N/V since arriving to unit,. Problem: Discharge Planning:  Goal: Discharged to appropriate level of care  Description  Discharged to appropriate level of care  Outcome: Ongoing  Note:   Pt plans to return home tomorrow. Problem: Mobility - Impaired:  Goal: Mobility will improve  Description  Mobility will improve  Outcome: Ongoing  Note:   Pt ambulating with walker and standby assist, tolerating well. Problem: Infection - Surgical Site:  Goal: Will show no infection signs and symptoms  Description  Will show no infection signs and symptoms  Outcome: Ongoing  Note:   Pt has been afebrile since arriving to unit. Will monitor for changes. Problem: Pain - Acute:  Goal: Pain level will decrease  Description  Pain level will decrease  Outcome: Ongoing  Note:   PO c/o R knee pain rated 8/10. PRN Morphine and Percocet administered with moderate results. Problem: Falls - Risk of:  Goal: Will remain free from falls  Description  Will remain free from falls  Outcome: Ongoing  Note:   Pt free from injury or falls at this time, fall precautions in place, bed in low position, side rail up x2, Sosa Fall Risk: High (45 and higher), bed alarm on, reoriented to room and call light, reminded not to get up without assistance, call light in reach, will continue to monitor. Pt verbalizes understanding of fall risk procedures. Goal: Absence of physical injury  Description  Absence of physical injury  Outcome: Ongoing  Note:   Pt has not incurred any type of physical injury this shift.      Problem: Pain:  Goal: Pain level will decrease  Description  Pain level will decrease  Outcome: Ongoing  Note:   PO c/o R knee pain rated 8/10. PRN Morphine and Percocet administered with moderate results.

## 2019-04-01 NOTE — PROGRESS NOTES
4 Eyes Admission Assessment     I agree as the admission nurse that 2 RN's have performed a thorough Head to Toe Skin Assessment on the patient. ALL assessment sites listed below have been assessed on admission. Areas assessed by both nurses: Yamel Ovalles RN and Dina Newby RN  [x]   Head, Face, and Ears   [x]   Shoulders, Back, and Chest  [x]   Arms, Elbows, and Hands   [x]   Coccyx, Sacrum, and Ischum  [x]   Legs, Feet, and Heels        Does the Patient have Skin Breakdown?   No         Arik Prevention initiated:  NA   Wound Care Orders initiated:  NA      WOC nurse consulted for Pressure Injury (Stage 3,4, Unstageable, DTI, NWPT, and Complex wounds):  NA      Nurse 1 eSignature: Electronically signed by Renetta Regan RN on 4/1/19 at 5:46 PM    **SHARE this note so that the co-signing nurse is able to place an eSignature**    Nurse 2 eSignature: Electronically signed by Margarita Trevizo RN on 4/1/2019 at 5:47 PM

## 2019-04-02 VITALS
OXYGEN SATURATION: 94 % | RESPIRATION RATE: 18 BRPM | WEIGHT: 162.92 LBS | HEART RATE: 77 BPM | SYSTOLIC BLOOD PRESSURE: 131 MMHG | DIASTOLIC BLOOD PRESSURE: 55 MMHG | TEMPERATURE: 97.9 F | HEIGHT: 63 IN | BODY MASS INDEX: 28.87 KG/M2

## 2019-04-02 LAB
ESTIMATED AVERAGE GLUCOSE: 119.8 MG/DL
GLUCOSE BLD-MCNC: 141 MG/DL (ref 70–99)
HBA1C MFR BLD: 5.8 %
HCT VFR BLD CALC: 39.7 % (ref 36–48)
HEMOGLOBIN: 12.9 G/DL (ref 12–16)
PERFORMED ON: ABNORMAL

## 2019-04-02 PROCEDURE — 94760 N-INVAS EAR/PLS OXIMETRY 1: CPT

## 2019-04-02 PROCEDURE — 97110 THERAPEUTIC EXERCISES: CPT

## 2019-04-02 PROCEDURE — 36415 COLL VENOUS BLD VENIPUNCTURE: CPT

## 2019-04-02 PROCEDURE — 97530 THERAPEUTIC ACTIVITIES: CPT

## 2019-04-02 PROCEDURE — 6370000000 HC RX 637 (ALT 250 FOR IP): Performed by: ORTHOPAEDIC SURGERY

## 2019-04-02 PROCEDURE — 85018 HEMOGLOBIN: CPT

## 2019-04-02 PROCEDURE — 2500000003 HC RX 250 WO HCPCS: Performed by: ORTHOPAEDIC SURGERY

## 2019-04-02 PROCEDURE — 85014 HEMATOCRIT: CPT

## 2019-04-02 PROCEDURE — 6370000000 HC RX 637 (ALT 250 FOR IP): Performed by: NURSE PRACTITIONER

## 2019-04-02 PROCEDURE — 2580000003 HC RX 258: Performed by: ORTHOPAEDIC SURGERY

## 2019-04-02 PROCEDURE — 83036 HEMOGLOBIN GLYCOSYLATED A1C: CPT

## 2019-04-02 PROCEDURE — 97116 GAIT TRAINING THERAPY: CPT

## 2019-04-02 PROCEDURE — 6360000002 HC RX W HCPCS: Performed by: ORTHOPAEDIC SURGERY

## 2019-04-02 PROCEDURE — 97535 SELF CARE MNGMENT TRAINING: CPT

## 2019-04-02 RX ORDER — ASPIRIN 81 MG/1
81 TABLET ORAL 2 TIMES DAILY
Qty: 28 TABLET | Refills: 0 | Status: SHIPPED | OUTPATIENT
Start: 2019-04-02 | End: 2022-06-30

## 2019-04-02 RX ORDER — OXYCODONE HYDROCHLORIDE AND ACETAMINOPHEN 5; 325 MG/1; MG/1
1-2 TABLET ORAL EVERY 4 HOURS PRN
Qty: 60 TABLET | Refills: 0 | Status: SHIPPED | OUTPATIENT
Start: 2019-04-02 | End: 2019-04-08 | Stop reason: SDUPTHER

## 2019-04-02 RX ORDER — CLONAZEPAM 1 MG/1
1 TABLET ORAL NIGHTLY
Status: DISCONTINUED | OUTPATIENT
Start: 2019-04-02 | End: 2019-04-02 | Stop reason: HOSPADM

## 2019-04-02 RX ADMIN — OXYCODONE HYDROCHLORIDE AND ACETAMINOPHEN 1 TABLET: 5; 325 TABLET ORAL at 08:35

## 2019-04-02 RX ADMIN — DOCUSATE SODIUM 100 MG: 100 CAPSULE, LIQUID FILLED ORAL at 08:33

## 2019-04-02 RX ADMIN — Medication 10 ML: at 08:33

## 2019-04-02 RX ADMIN — OXYCODONE HYDROCHLORIDE AND ACETAMINOPHEN 2 TABLET: 5; 325 TABLET ORAL at 03:50

## 2019-04-02 RX ADMIN — CLONAZEPAM 1 MG: 1 TABLET ORAL at 00:18

## 2019-04-02 RX ADMIN — MORPHINE SULFATE 4 MG: 2 INJECTION, SOLUTION INTRAMUSCULAR; INTRAVENOUS at 00:13

## 2019-04-02 RX ADMIN — Medication 2 G: at 03:50

## 2019-04-02 RX ADMIN — FAMOTIDINE 20 MG: 10 INJECTION, SOLUTION INTRAVENOUS at 08:33

## 2019-04-02 RX ADMIN — CELECOXIB 200 MG: 200 CAPSULE ORAL at 06:21

## 2019-04-02 RX ADMIN — ENOXAPARIN SODIUM 30 MG: 30 INJECTION SUBCUTANEOUS at 08:33

## 2019-04-02 ASSESSMENT — PAIN DESCRIPTION - ONSET
ONSET: ON-GOING

## 2019-04-02 ASSESSMENT — PAIN SCALES - GENERAL
PAINLEVEL_OUTOF10: 4
PAINLEVEL_OUTOF10: 5
PAINLEVEL_OUTOF10: 7
PAINLEVEL_OUTOF10: 2
PAINLEVEL_OUTOF10: 0
PAINLEVEL_OUTOF10: 10
PAINLEVEL_OUTOF10: 5
PAINLEVEL_OUTOF10: 4
PAINLEVEL_OUTOF10: 10

## 2019-04-02 ASSESSMENT — PAIN DESCRIPTION - FREQUENCY
FREQUENCY: CONTINUOUS

## 2019-04-02 ASSESSMENT — PAIN DESCRIPTION - PROGRESSION
CLINICAL_PROGRESSION: NOT CHANGED
CLINICAL_PROGRESSION: RAPIDLY WORSENING
CLINICAL_PROGRESSION: GRADUALLY IMPROVING
CLINICAL_PROGRESSION: NOT CHANGED
CLINICAL_PROGRESSION: NOT CHANGED
CLINICAL_PROGRESSION: RAPIDLY WORSENING

## 2019-04-02 ASSESSMENT — PAIN DESCRIPTION - PAIN TYPE
TYPE: ACUTE PAIN;SURGICAL PAIN
TYPE: SURGICAL PAIN
TYPE: SURGICAL PAIN
TYPE: ACUTE PAIN;SURGICAL PAIN
TYPE: ACUTE PAIN;SURGICAL PAIN
TYPE: SURGICAL PAIN
TYPE: ACUTE PAIN;SURGICAL PAIN
TYPE: ACUTE PAIN;SURGICAL PAIN

## 2019-04-02 ASSESSMENT — PAIN DESCRIPTION - DESCRIPTORS
DESCRIPTORS: ACHING
DESCRIPTORS: ACHING;THROBBING
DESCRIPTORS: ACHING

## 2019-04-02 ASSESSMENT — PAIN DESCRIPTION - LOCATION
LOCATION: KNEE

## 2019-04-02 ASSESSMENT — PAIN DESCRIPTION - ORIENTATION
ORIENTATION: RIGHT

## 2019-04-02 NOTE — PROGRESS NOTES
Patient discharged with medications and belongings. Patient discharge via wheelchair to private car going to private residence. Patient requesting pain medication before leaving, patient is not due to take oral pain medication until 1230pm. Daughter anxious to take patient home, would not let RN assist patient into the car.

## 2019-04-02 NOTE — PROGRESS NOTES
Huddle performed this morning including Nurse navigator Julia Croft, Physical therapist Divya Talbot, and Occupational therapist gerry. Discussed plan of care, discharge plan, and dme needs if applicable for orthopedic total joint patient.     Electronically signed by Yvonne Brooks RN on 4/2/2019 at 9:03 AM

## 2019-04-02 NOTE — PROGRESS NOTES
Met with patient at bedside, discussed role of nurse navigator and gave contact information. Reviewed reasons to call with questions or concerns, importance of TEDS, Incentive spirometer, pain medication, and physical and occupational therapy. 2/4 bed rails up, bed in lowest position, fall precautions in place, call light within reach. Pulses present bilaterally +2 pedal, no drainage or odor noted at surgical dressing right knee. prineo Dressing clean, dry, and intact. Ice in place. Teds on BLEs. DC Plan: home w daughter and c. daughter to transport patient.   DME needs:denies need    Jaymie Pineda  Orthopedic Nurse Navigator  Phone number: [phone]  Electronically signed by Lise Bullard RN on 4/2/2019 at 9:22 AM

## 2019-04-02 NOTE — PROGRESS NOTES
Occupational Therapy  Facility/Department: [address] ORTHOPEDICS  Daily Treatment Note  NAME: Pastor Koehler  : 1940  MRN: [id number]    Date of Service: 2019    Discharge Recommendations:  Home with Home health OT, 24 hour supervision or assist, S Level 1       Keely Paulino scored a 21/24 on the AM-PAC ADL Inpatient form. Current research shows that an AM-PAC score of 18 or greater is typically associated with a discharge to the patient's home setting. Based on the patients AM-PAC score and their current ADL deficits, it is recommended that the patient have 2-3 sessions per week of Occupational Therapy at d/c to increase the patients independence. Assessment: Discussed with OTR am pac score is 21. Patient would benefit from continued skilled OT in the home setting to increase [place] and decrease caregiver burden. Patient completes transfers and functional mobility with RW with SBA. Patient requires cues for hand placement and safety. Patient tends to be impulsive with functional mobility and transfers. Patient Diagnosis(es): The encounter diagnosis was Arthritis of right knee. has a past medical history of Arthritis, Diverticulitis, History of nonmelanoma skin cancer, Hyperlipidemia, Osteoarthritis, Restless legs syndrome, and Wrist fracture, left.   has a past surgical history that includes Colon surgery (); Cholecystectomy (); Skin cancer excision (Left, 2019); and Total knee arthroplasty (Right, 2019).     Restrictions  Restrictions/Precautions  Restrictions/Precautions: Fall Risk, Weight Bearing  Lower Extremity Weight Bearing Restrictions  Right Lower Extremity Weight Bearing: Weight Bearing As Tolerated  Position Activity Restriction  Other position/activity restrictions: 19: 1 L of O2 sats sitting down after amb= 98%, 85 bpm  Subjective   General  Chart Reviewed: Yes  Patient assessed for rehabilitation services?: Yes  Additional Pertinent Hx: Pt is 77 y.o. F who presents with arthritis of R knee, s/p R TKA 4/1/19. Pt is WBAT. PMH: Diverticulitis, Restless legs syndrome, L wrist fx 2017  Response to previous treatment: Patient with no complaints from previous session  Family / Caregiver Present: No  Referring Practitioner: Tracy Padilla MD  Diagnosis: Arthritis of R knee  Subjective  Subjective: Patient supine in bed upon arrival to room, agreeable to therapy   Pain Assessment  Pain Assessment: 0-10  Pain Level: 5  Pain Type: Surgical pain  Pain Location: Knee  Pain Orientation: Right  Non-Pharmaceutical Pain Intervention(s): Cold applied  Vital Signs  Patient Currently in Pain: Yes   Orientation  Orientation  Overall Orientation Status: Within Functional Limits  Objective    ADL  UE Dressing: Setup  LE Dressing: Setup;Stand by assistance        Balance  Sitting Balance: Supervision  Standing Balance: Stand by assistance  Standing Balance  Activity: Stood for ADL tasks  Sit to stand: Stand by assistance(cues for hand placement and safety)  Stand to sit: Stand by assistance(cues for hand placement and safety)  Functional Mobility  Functional - Mobility Device: Rolling Walker  Activity: Other  Assist Level: Stand by assistance  Functional Mobility Comments: Functional mobility with RW with SBA, no LOB noted, cues required for walker safety and to slow pace of mobility   Bed mobility  Sit to Supine: Unable to assess(up in chair at end of session)  Transfers  Sit to stand: Stand by assistance(cues for hand placement and safety)  Stand to sit: Stand by assistance(cues for hand placement and safety)  Transfer Comments: Transfer to recliner chair with SBA with cues for hand placement and safety      Cognition  Overall Cognitive Status: WFL  Cognition Comment: Tends to be impulsive with functional mobility and transfers       Assessment   Performance deficits / Impairments: Decreased functional mobility ; Decreased strength;Decreased endurance;Decreased ADL status; Decreased balance  Assessment: Discussed with OTR am pac score is 21. Patient would benefit from continued skilled OT in the home setting to increase [place] and decrease caregiver burden. Patient completes transfers and functional mobility with RW with SBA. Patient requires cues for hand placement and safety. Patient tends to be impulsive with functional mobility and transfers. Patient Education: Role of OT, POC, safety   REQUIRES OT FOLLOW UP: Yes  Activity Tolerance  Activity Tolerance: Patient Tolerated treatment well  Safety Devices  Safety Devices in place: [facility] informed)  Type of devices: Call light within reach; Chair alarm in place;Gait belt;Left in chair;Nurse notified         Plan   Plan  Times per week: 1-2  Times per day: Daily  Current Treatment Recommendations: Strengthening, Functional Mobility Training, Endurance Training, Balance Training, Safety Education & Training, Equipment Evaluation, Education, & procurement, Patient/Caregiver Education & Training, Self-Care / ADL    AM-PAC Score        AM-[facility] Inpatient Daily Activity Raw Score: 21  AM-PAC Inpatient ADL T-Scale Score : 44.27  ADL Inpatient CMS 0-100% Score: 32.79  ADL Inpatient CMS G-Code Modifier : CJ    Goals  Short term goals  Time Frame for Short term goals: prior to d/c: All goals ongoing except where noted: Plan is for discharge to home this date   Short term goal 1: Pt will complete functional mobility and transfers with SPV  Short term goal 2: Pt will complete bathing with CGA  Short term goal 3: Pt will complete dressing with CGA: Goal met 4/2/19  Short term goal 4: Pt will complete toileting with CGA  Short term goal 5: Pt will complete grooming in stance at sink with SBA  Patient Goals   Patient goals : \"to go home tomorrow with my family\"       Therapy Time   Individual Concurrent Group Co-treatment   Time In [address] [facility] [address]         Time Out 0853         Minutes 62                 Electronically signed by Jesus Castillo [id number] on 4/2/2019 at 9:59 AM   If discharged prior to next OT session please see last daily note for discharge status

## 2019-04-02 NOTE — PROGRESS NOTES
Checking on patient Q2H for nutrition needs, hygiene needs, comfort measures, mobility, fall risk interventions, and safe environment. All precautions and interventions in place. Educated patient on use of call light and telephone. Patient verbalizes understanding. Call light/telephone in reach.

## 2019-04-02 NOTE — PROGRESS NOTES
Patient alert and oriented x4, plan is to discharge to home. IV was removed with no complications, after discussing with bedside TAYLER Nur. Reviewed discharge, follow up, and medication instructions with patient and patient verbalized understanding. Educated patient to wear faheem hoses for 6 weeks and to only remove when showering or at night if needed and to also wear Teds to follow up appointment with orthopedic surgeon. FAHEEM hose are on BLEs. Reviewed with patient importance of anticoagulant medication at home as ordered to reduce risk of postoperative DVT or PE clots. Pt verbalized understanding. Prescriptions to be filled by retail pharmacy. Neurovascular check performed and patient is WNLs, denies numbness/tingling in extremties. Incision site  prineo glue dressing assessed and is  clean,dry, and intact, no signs of redness, drainage, or odor noted.  patient's bedside TAYLER Nur notified of patient completing discharge instructions and iv removal.  Electronically signed by Shashi Ingram RN on 4/2/2019 at 9:23 AM

## 2019-04-02 NOTE — CARE COORDINATION
[facility]    Spoke with patient regarding [facility] services. Patient aware and agreeable to services. Demographics verified. Faxed orders to [facility]. Electronically signed by Sebastian Foster LPN on 6/5/93 at 63:11 PM        Sebastian Foster LPN  [facility] Transition Nurse  [phone]

## 2019-04-02 NOTE — PLAN OF CARE
unit protocol, and provide pain management measures as needed. Problem: Falls - Risk of:  Goal: Will remain free from falls  Description  Will remain free from falls  4/2/2019 0756 by Salo Acharya RN  Outcome: Ongoing  Note:   Patient educated on fall prevention. Call light is within reach, bed locked in lowest position, personal items within reach, and bed alarm is on. Will round on patient per unit guidelines. Problem: Falls - Risk of:  Goal: Absence of physical injury  Description  Absence of physical injury  4/2/2019 0756 by Salo Acharya RN  Outcome: Ongoing  Note:   Pt assessed for fall risk and fall precautions put into place. Bed in lowest position and wheels locked, call light within reach. Nonskid footwear in place. Patient educated on appropriate method of transfer and to call for assistance. Problem: Pain:  Goal: Pain level will decrease  Description  Pain level will decrease  4/2/2019 0756 by Salo Acharya RN  Outcome: Ongoing  Note:   Educated patient on pain management. Will assess patients pain level per unit protocol, and provide pain management measures as needed. Problem: Pain:  Goal: Control of acute pain  Description  Control of acute pain  4/2/2019 0756 by Salo Acharya RN  Outcome: Ongoing  Note:   Patient educated on acute pain. Taught patient to use call light to ask for pain medication. PRN pain medication given for acute pain. Will continue to monitor pain per unit protocol. Problem: Pain:  Goal: Control of chronic pain  Description  Control of chronic pain  4/2/2019 0756 by Salo Acharya RN  Outcome: Ongoing  Note:   Patient educated on chronic pain. Taught patient to use call light to ask for pain medication. Will continue to monitor pain per unit protocol.

## 2019-04-02 NOTE — PROGRESS NOTES
Physical Therapy    Facility/Department: [address] ORTHOPEDICS  Daily Treatment Note    This note serves as patient discharge summary if pt discharges prior to next PT visit    NAME: Rowland Kanner  : 1940  MRN: [id number]    Date of Service: 2019    Discharge Recommendations:  Home with Home health PT, S Level 1, 24 hour supervision or assist, Patient would benefit from continued therapy after discharge     Rowland Kanner scored a 19/24 on the AM-PAC short mobility form. Current research shows that an AM-PAC score of 18 or greater is typically associated with a discharge to the patient's home setting. Based on the patients AM-PAC score and their current functional mobility deficits, it is recommended that the patient have 2-3 sessions per week of Physical Therapy at d/c to increase the patients independence. Assessment   Body structures, Functions, Activity limitations: Decreased functional mobility ; Decreased ROM; Decreased strength; Increased Pain  Assessment: Prior to elective R TKR, pt living in 2 level home with spouse with 2 (platform) ROSANNA. Pt was independent with mobility, ADLs and driving. At 19 session, pt was CGA for transfers, requiring cues for proper hand placement for sit<>stand. Pt ambulated 100 ft using RW with CGA/SBA with chair follow. Pt ascended/descended curb step with RW with CGA and min A for cueing for walker/foot placement. Educated pt regarding her surgery, the recovery process and exercises for HEP. Pt tolerated treatment well- plan on d/c to home today with initial 24/7 family support and home PT level 1 to help progress safety, independence, and tolerance for functional mobility.   Treatment Diagnosis: Impaired functional mobility  Prognosis: Good  Patient Education: PT POC, HEP, TKE recovery process  Barriers to Learning: None  REQUIRES PT FOLLOW UP: Yes  Activity Tolerance  Activity Tolerance: Patient Tolerated treatment well;Patient limited by pain       Patient Diagnosis(es): The encounter diagnosis was Arthritis of right knee. has a past medical history of Arthritis, Diverticulitis, History of nonmelanoma skin cancer, Hyperlipidemia, Osteoarthritis, Restless legs syndrome, and Wrist fracture, left.   has a past surgical history that includes Colon surgery (2007); Cholecystectomy (1996); Skin cancer excision (Left, 03/18/2019); and Total knee arthroplasty (Right, 4/1/2019). Restrictions  Restrictions/Precautions  Restrictions/Precautions: Fall Risk, Weight Bearing  Lower Extremity Weight Bearing Restrictions  Right Lower Extremity Weight Bearing: Weight Bearing As Tolerated  Position Activity Restriction    Subjective  General  Chart Reviewed: Yes  Additional Pertinent Hx: 77 y.o. patient assessed post elective R TKR via Dr. Carmita Boone. PMHx as noted above. Response To Previous Treatment: Patient with no complaints from previous session.   Family / Caregiver Present: No  Referring Practitioner: Dr. Roxanne Rogers  Comments: Pt agreeable to therapy  Subjective  Subjective: Pt reports 5/10 pain while sitting in chair, pain increased to 7/10 pain with ambulation  Pain Screening  Patient Currently in Pain: Yes     Orientation  Orientation  Overall Orientation Status: Within Functional Limits  Social/Functional History  Social/Functional History  Lives With: Spouse  Type of Home: House  Home Layout: Two level, Laundry in basement, 1/2 bath on main level, Bed/Bath upstairs, Able to Live on Main level with bedroom/bathroom(11 steps to bedroom)  Home Access: Stairs to enter without rails  Entrance Stairs - Number of Steps: one plus one to enter (2 platform)  Bathroom Shower/Tub: Walk-in shower, Shower chair with back  Bathroom Toilet: Standard  Bathroom Accessibility: Walker accessible  Home Equipment: Rolling walker, Cane  ADL Assistance: Independent  Homemaking Assistance: Independent  Ambulation Assistance: Independent  Transfer Assistance: Independent  Active : Yes  Mode of Transportation: Car  Additional Comments: plans home with family assist and home PT    Objective  Bed mobility  Supine to Sit: Unable to assess(pt in chair at beginning of session)  Sit to Supine: Unable to assess(up in chair at end of session)  Transfers  Sit to Stand: Contact guard assistance(multiple cues required for hand placement, stand up to RW)  Stand to sit: Contact guard assistance(sit down from RW)  Ambulation  Ambulation?: Yes  Ambulation 1  Surface: level tile  Device: Rolling Walker  Assistance: Contact guard assistance;Stand by assistance  Quality of Gait: initially step to pattern on left, decreased stance time on right; after ~20' used step through pattern with equal stance times; use of RW for UE support, occasionally pushed RW out too far in front but corrected herself  Distance: 100' with chair follow.  Sat down in chair and rolled rest of the way to therapy gym  Stairs/Curb  Stairs?: Yes  Stairs  Curbs: 6\"  Device: Rolling walker  Assistance: Contact guard assistance  Comment: pt required cues to roll RW close to curb; cues for up with good, down with bad and walker mgmt  Balance  Posture: Good  Sitting - Static: Good  Sitting - Dynamic: Good  Standing - Static: Good  Standing - Dynamic: Good  Comments: use of RW for UE support in stance  Exercises  Quad Sets: x10  Gluteal Sets: x10  Knee Active Range of Motion: 7-93  Ankle Pumps: x10  Comments: AROM/AAROM knee flexion in sitting with foot on glide x10; adductor sets x10; HEP provided and reviewed with pt on 4-2-19     Plan   Plan  Times per week: 1-4 visits as needed  Current Treatment Recommendations: Strengthening, ROM, Functional Mobility Training, Gait Training, Transfer Training, Stair training, Home Exercise Program, Safety Education & Training, Patient/Caregiver Education & Training  Safety Devices  Type of devices: Call light within reach, Chair alarm in place, Gait belt, Left in chair, Nurse notified(TAYLER Shaffer notified)    AM-PAC Score  AM-PAC Inpatient Mobility Raw Score : 19  AM-PAC Inpatient T-Scale Score : 45.44  Mobility Inpatient CMS 0-100% Score: 41.77  Mobility Inpatient CMS G-Code Modifier : CK        Goals  Short term goals  Time Frame for Short term goals: By acute d/c- all goals met 4-2-19   Short term goal 1: Gait 48' w RW and SBA  Short term goal 2: Climb 2 platform steps w CGA/cues  Short term goal 3: Transfers SBA w min cues for hand placement   Short term goal 4: Tolerates 5-10 reps TKR initial HEP w min cues  Patient Goals   Patient goals : \"to go home\"     Therapy Time   Individual Concurrent Group Co-treatment   Time In 1013         Time Out 1110         Minutes [address] [facility], [facility]  Angel Luis Alaniz, PT   I attest that I was present for and made a skilled & mindful clinical judgement during the evaluation and/or treatment of this patient on 4/2/2019  Electronically signed by Jannette Frausto, PT [id number] on 4/2/2019 at 11:23 AM

## 2019-04-02 NOTE — FLOWSHEET NOTE
04/02/19 1014   Encounter Summary   Services provided to: Patient   Referral/Consult From: Nurse   Support System Spouse; Children;Family members   Continue Visiting   (visit, AD doc discussion 4/1 CL)   Complexity of Encounter Moderate   Length of Encounter 15 minutes   Spiritual/Anglican   Type Spiritual support   Assessment Calm; Approachable; Hopeful;Coping   Intervention Active listening;Explored feelings, thoughts, concerns; Discussed illness/injury and it's impact   Outcome Engaged in conversation;Coping; Hopeful   Advance Directives (For Healthcare)   Healthcare Directive No, patient does not have an advance directive for healthcare treatment   Advance Directives Documents given;Documents explained  (AD doc protocol complete 4/1 CL)   Electronically signed by James Newsome on 4/2/2019 at 10:16 AM'

## 2019-04-02 NOTE — CARE COORDINATION
Jarrett met with pt to confirm jet class d/c plans. Pt states she lives with her  in a two story home. There are a few steps to the second fl. Pt states she will remain on the first floor since everything she needs will be easily accessible. Pt states she has 2 wheel walker and shower chair. Pt states no other DME needs at this time. Pt states her daughter, Felipe Molina will transport upon d/c. Pt states she was given a [facility] [email or web address] [facility]. List--- [facility] is the pt's choice. Jarrett contacted [address] [facility] and  regarding pt above.      Electronically signed by Bradly Mccabe on 4/2/2019 at 10:21 AM  Electronically signed by Christiano Grover on 4/2/2019 at 10:33 AM

## 2019-04-02 NOTE — PROGRESS NOTES
Ace wrap removed from right leg. Prineo in place with no drainage noted. Pedal pulses palpable. Duc hose applied to right leg and ice machine reapplied. Medicated for pain with 2 tabs of Percocet. Pt denies needs at this time. Call light in reach. Will monitor.

## 2019-04-02 NOTE — PLAN OF CARE
Problem: Cardiac:  Goal: Ability to maintain vital signs within normal range will improve  Description  Ability to maintain vital signs within normal range will improve  4/2/2019 0321 by Bernard Domínguez RN  Outcome: Ongoing   VS remain stable. Problem: Nausea/Vomiting:  Goal: Absence of nausea/vomiting  Description  Absence of nausea/vomiting  4/2/2019 0321 by Bernard Domínguez RN  Outcome: Ongoing   Patient complaining of nausea, medicated with Zofran. Will continue to monitor. Problem: Discharge Planning:  Goal: Discharged to appropriate level of care  Description  Discharged to appropriate level of care  4/2/2019 0321 by Bernard Domínguez RN  Outcome: Ongoing  Pt is aware of DC plans. She is going home with homecare in am.   Problem: Mobility - Impaired:  Goal: Mobility will improve  Description  Mobility will improve  4/2/2019 0321 by Bernard Domínguez RN  Outcome: Ongoing   Pt is able to get OOB with SBA and RW. Problem: Infection - Surgical Site:  Goal: Will show no infection signs and symptoms  Description  Will show no infection signs and symptoms  4/2/2019 0321 by Bernard Domínguez RN  Outcome: Ongoing   No S/S of infection noted. Remains afebrile. Will continue to monitor. Problem: Pain - Acute:  Goal: Pain level will decrease  Description  Pain level will decrease  4/2/2019 0321 by Bernard Domínguez RN  Outcome: Ongoing   Pain/discomfort being managed with PRN analgesics per MD orders. Pt able to express presence and absence of pain and rate pain appropriately using numerical scale. Problem: Falls - Risk of:  Goal: Will remain free from falls  Description  Will remain free from falls  4/2/2019 0321 by Bernard Domínguez RN  Outcome: Ongoing     Problem: Falls - Risk of:  Goal: Absence of physical injury  Description  Absence of physical injury  4/2/2019 0321 by Bernadr Domínguez RN  Outcome: Ongoing   Fall risk assessment completed every shift.  All precautions in place. Pt has call light within reach at all times. Room clear of clutter. Pt aware to call for assistance when getting up.      Problem: Pain:  Goal: Pain level will decrease  Description  Pain level will decrease  4/2/2019 0321 by Marlene Bowser RN  Outcome: Ongoing

## 2019-04-02 NOTE — PROGRESS NOTES
Clinical Pharmacy Note  Medication Counseling    Reviewed new medications started during hospital admission: aspirin and percocet. Indications and side effects were emphasized during counseling. All medication-related questions addressed. Patient verbalized understanding of education. Should the patient express any additional questions or concerns regarding their medications, please do not hesitate to contact the pharmacy department. Patient/caregiver aware they may refuse medications during hospital stay.

## 2019-04-04 ENCOUNTER — TELEPHONE (OUTPATIENT)
Dept: ORTHOPEDICS UNIT | Age: 79
End: 2019-04-04

## 2019-04-04 ENCOUNTER — TELEPHONE (OUTPATIENT)
Dept: ORTHOPEDIC SURGERY | Age: 79
End: 2019-04-04

## 2019-04-04 NOTE — TELEPHONE ENCOUNTER
Patient daughter Caleb Alexis called states that patient has the worst pain in the morning. She is wondering if a time release pain medication can be prescribed to patient to help with morning pain?     Pharmacy:  [facility]: [phone]    Please call Caleb Alexis:  [phone]

## 2019-04-04 NOTE — TELEPHONE ENCOUNTER
Called and explained that [facility] does not rx time release pain medication  Patient is not taking her pain medication routinely

## 2019-04-05 ENCOUNTER — TELEPHONE (OUTPATIENT)
Dept: FAMILY MEDICINE CLINIC | Age: 79
End: 2019-04-05

## 2019-04-05 NOTE — TELEPHONE ENCOUNTER
[facility] Transitions Initial Follow Up Call    Outreach made within 2 business days of discharge: No    Patient: Andreina Martin Patient : 1940   MRN: [id number]  Reason for Admission: There are no discharge diagnoses documented for the most recent discharge. Discharge Date: 19       Spoke with: PATIENT    Discharge department/facility: [facility]    TCM Interactive Patient Contact:  Was patient able to fill all prescriptions: Yes  Was patient instructed to bring all medications to the follow-up visit: Yes  Is patient taking all medications as directed in the discharge summary? Yes  Does patient understand their discharge instructions: Yes  Does patient have questions or concerns that need addressed prior to 7-14 day follow up office visit: no    Scheduled appointment with PCP within 7-14 days    Follow Up  Future Appointments   Date Time Provider Toya Solomon   2019  3:15 PM BEKAH Nam     SPOKE TO PT, SHE HAS A FOLLOW UP APPT WITH SURGEON NEXT WEEK.   THERE IS NO NEEDS FOR FOLLOW UP HERE AT THIS TIME. [address] [facility]    Dominik Brown MA

## 2019-04-08 ENCOUNTER — TELEPHONE (OUTPATIENT)
Dept: ORTHOPEDIC SURGERY | Age: 79
End: 2019-04-08

## 2019-04-08 DIAGNOSIS — M17.11 ARTHRITIS OF RIGHT KNEE: ICD-10-CM

## 2019-04-08 DIAGNOSIS — Z96.651 STATUS POST TOTAL RIGHT KNEE REPLACEMENT: Primary | ICD-10-CM

## 2019-04-08 RX ORDER — OXYCODONE HYDROCHLORIDE AND ACETAMINOPHEN 5; 325 MG/1; MG/1
1-2 TABLET ORAL EVERY 4 HOURS PRN
Qty: 60 TABLET | Refills: 0 | Status: SHIPPED | OUTPATIENT
Start: 2019-04-08 | End: 2019-04-23 | Stop reason: SDUPTHER

## 2019-04-08 NOTE — TELEPHONE ENCOUNTER
Pt's daughter calling to get a refill of pain medication- pt was taking Oxycodone but it gives her dry mouth so asking if pt can try Vicodin instead  Also wanting to know if pt can take IB Profen too   pls call to advise

## 2019-04-08 NOTE — TELEPHONE ENCOUNTER
Rx request has been e-prescribed. OK to have patient  Rx at Pharmacy      Controlled substances monitoring: possible medication side effects, risk of tolerance and/or dependence, and alternative treatments discussed and OARRS report reviewed today- activity consistent with treatment plan.

## 2019-04-16 ENCOUNTER — OFFICE VISIT (OUTPATIENT)
Dept: ORTHOPEDIC SURGERY | Age: 79
End: 2019-04-16

## 2019-04-16 VITALS — TEMPERATURE: 97.6 F

## 2019-04-16 DIAGNOSIS — Z96.651 HISTORY OF TOTAL KNEE ARTHROPLASTY, RIGHT: Primary | ICD-10-CM

## 2019-04-16 PROCEDURE — APPSS15 APP SPLIT SHARED TIME 0-15 MINUTES: Performed by: PHYSICIAN ASSISTANT

## 2019-04-16 PROCEDURE — 99024 POSTOP FOLLOW-UP VISIT: CPT | Performed by: PHYSICIAN ASSISTANT

## 2019-04-17 NOTE — PROGRESS NOTES
30 tablet 2    pravastatin (PRAVACHOL) 40 MG tablet Take 1 tablet by mouth nightly 90 tablet 3    Multiple Vitamins-Minerals (CENTRUM SILVER PO) Take by mouth      vitamin B-12 (CYANOCOBALAMIN) 1000 MCG tablet Take 1,000 mcg by mouth daily      vitamin C (ASCORBIC ACID) 500 MG tablet Take 500 mg by mouth daily      vitamin D (CHOLECALCIFEROL) 1000 UNIT TABS tablet Take 1,000 Units by mouth daily       No current facility-administered medications for this visit. Objective:   She is alert, oriented x 3, pleasant, well nourished, developed and in no acute distress. Temp 97.6 °F (36.4 °C) (Temporal)   LMP  (Exact Date)      KNEE EXAM:  Examination of the right knee shows: There is no erythema. There is no drainage. There is mild soft tissue swelling. AROM -  Extension 5             -   Flexion  115  Extensor Mechanism is intact. Calf is not swollen. X Rays: performed in the office today:     AP, Lateral and Sunrise  right Knee: There is a cemented total knee arthroplasty present. The alignment is satisfactory. There are no fractures. Diagnosis:       ICD-10-CM    1. History of total knee arthroplasty, right-4/1/2019 Z96.651 XR KNEE RIGHT (3 VIEWS)     [facility] Physical Therapy [facility]        Assessment/plan:    Assessment/plan:     Clinically and radiographically stable right knee arthroplasty. Progress to out patient PT as discussed. FAHEEM Hose stocking for at least 6 weeks post op. DVT Prophylaxis on aspirin. .   Continue with Percocet for moderate to severe postoperative pain. May remove pernio tape dressing at 2-1/2-3 weeks postop if not already removed. Follow Up: 3-4 weeks. Call or return to clinic prn if these symptoms worsen or fail to improve as anticipated.

## 2019-04-19 ENCOUNTER — TELEPHONE (OUTPATIENT)
Dept: ORTHOPEDIC SURGERY | Age: 79
End: 2019-04-19

## 2019-04-19 NOTE — TELEPHONE ENCOUNTER
Called and patient was unable to get in to physical therapy until the 7th of may. Home pt tried calling both rodriguez and [facility] Samy Amezcua S and was unable to get a hold of anyone. Gave verbal to continue home pt until she can get in sooner.

## 2019-04-22 ENCOUNTER — TELEPHONE (OUTPATIENT)
Dept: ORTHOPEDIC SURGERY | Age: 79
End: 2019-04-22

## 2019-04-22 DIAGNOSIS — M17.11 ARTHRITIS OF RIGHT KNEE: ICD-10-CM

## 2019-04-22 NOTE — TELEPHONE ENCOUNTER
Patient states that she is due for a refill of pain med's, Percocet 5/325 mg was last filled 4/8/19  Patient asking if [facility] can prescribe something stronger - states the current pain med's do not help the pain     Preferred pharmacy -[facility] on [address]- [phone]    Patient can be reached at [phone]

## 2019-04-23 RX ORDER — OXYCODONE HYDROCHLORIDE AND ACETAMINOPHEN 5; 325 MG/1; MG/1
1-2 TABLET ORAL EVERY 4 HOURS PRN
Qty: 60 TABLET | Refills: 0 | Status: SHIPPED | OUTPATIENT
Start: 2019-04-23 | End: 2019-05-20 | Stop reason: SDUPTHER

## 2019-04-26 ENCOUNTER — OFFICE VISIT (OUTPATIENT)
Dept: ENT CLINIC | Age: 79
End: 2019-04-26
Payer: MEDICARE

## 2019-04-26 ENCOUNTER — TELEPHONE (OUTPATIENT)
Dept: ORTHOPEDIC SURGERY | Age: 79
End: 2019-04-26

## 2019-04-26 ENCOUNTER — TELEPHONE (OUTPATIENT)
Dept: ENT CLINIC | Age: 79
End: 2019-04-26

## 2019-04-26 VITALS — OXYGEN SATURATION: 96 % | SYSTOLIC BLOOD PRESSURE: 122 MMHG | HEART RATE: 63 BPM | DIASTOLIC BLOOD PRESSURE: 62 MMHG

## 2019-04-26 DIAGNOSIS — H69.92 DISORDER OF LEFT EUSTACHIAN TUBE: Primary | ICD-10-CM

## 2019-04-26 DIAGNOSIS — M26.609 TMJ (TEMPOROMANDIBULAR JOINT DISORDER): ICD-10-CM

## 2019-04-26 PROCEDURE — G8427 DOCREV CUR MEDS BY ELIG CLIN: HCPCS | Performed by: OTOLARYNGOLOGY

## 2019-04-26 PROCEDURE — 1123F ACP DISCUSS/DSCN MKR DOCD: CPT | Performed by: OTOLARYNGOLOGY

## 2019-04-26 PROCEDURE — G8417 CALC BMI ABV UP PARAM F/U: HCPCS | Performed by: OTOLARYNGOLOGY

## 2019-04-26 PROCEDURE — 4004F PT TOBACCO SCREEN RCVD TLK: CPT | Performed by: OTOLARYNGOLOGY

## 2019-04-26 PROCEDURE — G8399 PT W/DXA RESULTS DOCUMENT: HCPCS | Performed by: OTOLARYNGOLOGY

## 2019-04-26 PROCEDURE — 1090F PRES/ABSN URINE INCON ASSESS: CPT | Performed by: OTOLARYNGOLOGY

## 2019-04-26 PROCEDURE — 99213 OFFICE O/P EST LOW 20 MIN: CPT | Performed by: OTOLARYNGOLOGY

## 2019-04-26 PROCEDURE — 4040F PNEUMOC VAC/ADMIN/RCVD: CPT | Performed by: OTOLARYNGOLOGY

## 2019-04-26 RX ORDER — METHYLPREDNISOLONE 4 MG/1
4 TABLET ORAL SEE ADMIN INSTRUCTIONS
Qty: 1 KIT | Refills: 0 | Status: SHIPPED | OUTPATIENT
Start: 2019-04-26 | End: 2019-07-12 | Stop reason: ALTCHOICE

## 2019-04-26 NOTE — PROGRESS NOTES
Over the course of the past 2 months, the patient is noticing a sensation of fluid in her left ear associated with particular change in her hearing. Symptoms seem intermittent in nature. She's had some tinnitus as well on the left side. This been no vertigo or fever. No specific pain is been noted as well. The right side is clear of any symptoms. She does have some sinus symptoms but not severe. She does not smoke. Currently, she appears to be in no obvious acute distress. Ear examination reveals normal-appearing tympanic membrane but some slight retraction present on the left. The ear canal is clear. The right side is normal.  There is some tenderness to the left temporomandibular joint area as well. Oral examination is unremarkable. The neck is free of adenopathy, mass, thyroid enlargement. The nasal mucosa appears basically normal.  Findings seen to be those of eustachian tube dysfunction on the left side possibly also with related temporomandibular joint dysfunction. We will start her on a course of Medrol Dosepak. I have asked that she contact me one week to determine her response to therapy.

## 2019-04-26 NOTE — TELEPHONE ENCOUNTER
Porsche Vega from [facility] is calling about pt is being discharged from home PT today. She is starting out patient Tuesday. She said that she is having lateral calf and knee pain. Porsche Vega thinks maybe she is over doing it. She answer the door with the cane in her hand instead of using it. She told patient to ice and go back to the walker. She would like someone to call her.

## 2019-04-30 ENCOUNTER — HOSPITAL ENCOUNTER (OUTPATIENT)
Dept: PHYSICAL THERAPY | Age: 79
Setting detail: THERAPIES SERIES
Discharge: HOME | End: 2019-04-30
Payer: MEDICARE

## 2019-04-30 PROCEDURE — 97530 THERAPEUTIC ACTIVITIES: CPT

## 2019-04-30 PROCEDURE — 97161 PT EVAL LOW COMPLEX 20 MIN: CPT

## 2019-04-30 PROCEDURE — 97110 THERAPEUTIC EXERCISES: CPT

## 2019-04-30 ASSESSMENT — PAIN DESCRIPTION - PROGRESSION: CLINICAL_PROGRESSION: GRADUALLY IMPROVING

## 2019-04-30 ASSESSMENT — PAIN DESCRIPTION - DESCRIPTORS: DESCRIPTORS: ACHING;SORE

## 2019-04-30 ASSESSMENT — PAIN SCALES - GENERAL: PAINLEVEL_OUTOF10: 0

## 2019-04-30 ASSESSMENT — PAIN DESCRIPTION - ORIENTATION: ORIENTATION: RIGHT

## 2019-04-30 ASSESSMENT — PAIN DESCRIPTION - PAIN TYPE: TYPE: SURGICAL PAIN

## 2019-04-30 ASSESSMENT — PAIN DESCRIPTION - LOCATION: LOCATION: KNEE

## 2019-04-30 ASSESSMENT — PAIN - FUNCTIONAL ASSESSMENT: PAIN_FUNCTIONAL_ASSESSMENT: PREVENTS OR INTERFERES SOME ACTIVE ACTIVITIES AND ADLS

## 2019-04-30 ASSESSMENT — PAIN DESCRIPTION - FREQUENCY: FREQUENCY: INTERMITTENT

## 2019-04-30 NOTE — FLOWSHEET NOTE
none    Timed Code Treatment Minutes:  25    Total Treatment Minutes:  40    Treatment/Activity Tolerance:  [x] Patient tolerated treatment well [] Patient limited by fatigue  [] Patient limited by pain  [] Patient limited by other medical complications  [] Other:     Prognosis: [x] Good [] Fair  [] Poor    Patient Requires Follow-up: [x] Yes  [] No    Plan:   [] Continue per plan of care [] Alter current plan (see comments)  [x] Plan of care initiated [] Hold pending MD visit [] Discharge    Plan for Next Session:  Knee AROM, strength, endurance, balance, gait training, manual PRN, MOC    Electronically signed by:  Aniyah Dose, PT, DPT

## 2019-04-30 NOTE — PLAN OF CARE
Outpatient Physical Therapy     Phone: [phone] Fax: [phone]     To: Referring Practitioner: BEKAH Robert      Patient: Stew Shahid   : 1940   MRN: [id number]  Evaluation Date: 2019      Diagnosis Information:  · Diagnosis: R TKA (19; Dr. Maeve Stern)   · Treatment Diagnosis: decreased R knee AROM, strength, endurance, standing tolerance, and decreased balanc     Physical Therapy Certification/Re-Certification Form  Dear BEKAH Robert,   The following patient has been evaluated for physical therapy services. Please review the attached evaluation and/or summary of the patient's plan of care, and verify that you agree therapy should continue by signing the attached document and sending it back to our office. Plan of Care/Treatment to date:  [x] Therapeutic Exercise      [x] Modalities:  [x] Therapeutic Activity        [] Ultrasound    [x] Gait Training        [] Cervical Traction   [x] Neuromuscular Re-education      [x] Cold/hotpack    [x] Instruction in HEP        [] Lumbar Traction  [x] Manual Therapy        [] Electrical Stimulation            [] Aquatic Therapy        [] Iontophoresis        ? [] Lymphedema management  [] Women's Health     Other:  [] Vestibular Rehab        []    []  Needed     Frequency/Duration:  # Days per week: [] 1 day # Weeks: [] 1 week [x] 5 weeks     [x] 2 days? [] 2 weeks [] 6 weeks     [] 3 days   [] 3 weeks [] 7 weeks     [] 4 days   [] 4 weeks [] 8 weeks    Rehab Potential: [] Excellent [x] Good [] Fair  [] Poor     Electronically signed by:  Alexandrea Hyman PT, DPT    If you have any questions or concerns, please don't hesitate to call.   Thank you for your referral.      Physician Signature:________________________________Date:__________________  By signing above, therapists plan is approved by physician

## 2019-04-30 NOTE — PROGRESS NOTES
Physical Therapy  Initial Assessment  Date: 2019  Patient Name: Sindi Patrick  MRN: [id number]  : 1940     Treatment Diagnosis: decreased R knee AROM, strength, endurance, standing tolerance, and decreased balanc    Restrictions   None    Subjective   General  Chart Reviewed: Yes  Additional Pertinent Hx: arthritis, diverticulitis, skin CA, OA, RLS, L wrist fracture, R TKA  Response To Previous Treatment: Not applicable  Family / Caregiver Present: No  Referring Practitioner: BEKAH Retana  Referral Date : 19  Diagnosis: R TKA (19; Dr. Skip Gerber)  PT Visit Information  Onset Date: 19  PT Insurance Information: [employer], Medicare (med [place])  Subjective  Subjective: Pt reports she had R TKA on 19. Pt had home therapy for 3 weeks. Pt reports she doesn't have problmes with much right now. CLOF: Pt is dressing herself, taking a shower, cooking and light cleaning. She is not driving due to being on pain meds. She does not live alone. She denies tingling/numbness since surgery. Has been doing exercises at home. Feels her strength is slightly decreased but balance is pretty good. Did not walk with an AD prior to surgery. PLOF: Pain in knee started at Jose M time and progressively got better. Did have injections but they did not help. Did go to therapy but it didn't help. Goals: increase strength and flexibility of R LE, would like to get back to gardening, kneeling and standing for long periods without pain, and driving   Pain Screening  Patient Currently in Pain: Yes  Pain Assessment  Pain Assessment: 0-10  Pain Level: 0(took two pain pills an hour ago )  Pain Type: Surgical pain  Pain Location: Knee  Pain Orientation: Right  Pain Descriptors: Aching; Sore  Pain Frequency: Intermittent  Clinical Progression: Gradually improving  Functional Pain Assessment: Prevents or interferes some active activities and ADLs  Vital Signs  Patient Currently in Pain: Yes    Vision/Hearing  Vision  Vision: Impaired(glasses for reading )  Hearing  Hearing: Within functional limits    Orientation  Orientation  Overall Orientation Status: Within Normal Limits    Social/Functional History  Social/Functional History  Lives With: Spouse  Type of Home: House  Home Layout: Two level  ADL Assistance: Independent  Homemaking Assistance: Independent  Homemaking Responsibilities: Yes  Ambulation Assistance: Independent  Transfer Assistance: Independent  Active : Yes  Mode of Transportation: Car  Occupation: Retired  Leisure & Hobbies: cares for great grand kids, walking, caring for     Objective     Observation/Palpation  Posture: Good  Palpation: a little TTP along incision   Observation: scar healing well, two scabs remain but no redness or warmth  Edema: edema noted surrounding R patella and knee joint     AROM RLE (degrees)  RLE General AROM: supine, goni  R Knee Flexion 0-145: 120 deg  R Knee Extension 0:  lacking 4 deg   AROM LLE (degrees)  LLE General AROM: supine, goni  L Knee Flexion 0-145: 125 deg  L Knee Extension 0: 0 deg    Strength RLE  R Hip Flexion: 4+/5  R Hip Internal Rotation: 5/5  R Hip External Rotation: 5/5  R Knee Flexion: 5/5  R Knee Extension: 5/5  R Ankle Dorsiflexion: 5/5  Strength LLE  L Hip Flexion: 5/5  L Hip Internal Rotation: 5/5  L Hip External Rotation: 5/5  L Knee Flexion: 5/5  L Knee Extension: 5/5  L Ankle Dorsiflexion: 5/5  Tone RLE  RLE Tone: Normotonic  Tone LLE  LLE Tone: Normotonic  Motor Control  Gross Motor?: WNL  Additional Measures  Special Tests: 30 sec STS: 8 reps   Other: no extensor lag noted                 Ambulation  Ambulation?: Yes  Ambulation 1  Surface: level tile  Device: No Device  Assistance: Supervision  Quality of Gait: slightly decreased heel toe pattern on R, good knee flexion and ext noted   Distance: 30 feet  Balance  Posture: Good  Sitting - Static: Good  Sitting - Dynamic: Good  Standing - Static: Good  Standing - Dynamic: Good  Tandem Stance R Leg: 10  Tandem Stance L Le  Single Leg Stance R Le  Single Leg Stance L Le       Outpatient fall risk assessment completed asking screening question if patient has fallen in the past 30 days:  [x] Yes  [] No    Based on screen for falls, patient demonstrates fall risk:  [] Yes  [x] No    Interventions based on fall risk status:  Updated Problem List within Medical History  [] Yes   [x] N/A    Asked family to assist with increased observation of the patient  [] Yes   [x] N/A    Patient kept in visible area when not closely supervised by therapist  [] Yes   [x] N/A    Repeatedly reinforce activity limits and safety needs with patient/family  [] Yes   [x] N/A    Increase frequency of rounding/monitoring patient  [] Yes   [x] N/A        Assessment   Conditions Requiring Skilled Therapeutic Intervention  Body structures, Functions, Activity limitations: Decreased functional mobility ; Decreased strength;Decreased high-level IADLs;Decreased endurance;Decreased balance;Decreased ADL status; Decreased ROM; Increased Pain  Assessment: Pt is a 78 y/o female who presents s/p R TKA on 19. She currently demos decreased R knee AROM, strength, endurance, standing tolerance, and decreased balance. Pt would benefit from skilled therapy to address deficits to increase safety at home and in the community as well as return to all activities without pain or limitations.     Treatment Diagnosis: decreased R knee AROM, strength, endurance, standing tolerance, and decreased balanc  Prognosis: Good  Decision Making: Low Complexity  History: see above  Exam: see above  Patient Education: PT role and plan  Barriers to Learning: none  REQUIRES PT FOLLOW UP: Yes         Plan   Plan  Times per week: 2x/week for 5 weeks  Current Treatment Recommendations: Strengthening, ROM, Balance Training, Functional Mobility Training, IADL Training, Endurance Training, Gait Training, Neuromuscular Re-education, Manual Therapy - Soft Tissue Mobilization, Manual Therapy - Joint Manipulation, Home Exercise Program, Positioning, Modalities    OutComes Score  LEFS Total Score: 52                           Goals  Long term goals  Time Frame for Long term goals : 5 weeks:   Long term goal 1: Pt increase R knee AROM by 5 deg or greater to improve ability to squat and perform activites around the home without limitations or pain. Long term goal 2: Pt improve R knee strength to 5/5 to increase ability to walk for long distances without discomfort. Long term goal 3: Pt will report pain at 2/10 or less to progress towards PLOF of no pain wth ADLs. Long term goal 4: Pt will demo ability to perform SLS for 10 sec or greater on R LE to demo increased balance on stairs, unfamiliar surfaces, and increase safety. Long term goal 5: Pt will increase 30 sec STS reps by 5 to demo increase in LE endurance.         Terrance Stokes, [facility], DPT [id number]

## 2019-05-03 ENCOUNTER — HOSPITAL ENCOUNTER (OUTPATIENT)
Dept: PHYSICAL THERAPY | Age: 79
Setting detail: THERAPIES SERIES
Discharge: HOME | End: 2019-05-03
Payer: MEDICARE

## 2019-05-03 PROCEDURE — 97110 THERAPEUTIC EXERCISES: CPT

## 2019-05-03 PROCEDURE — 97116 GAIT TRAINING THERAPY: CPT

## 2019-05-03 NOTE — FLOWSHEET NOTE
Physical Therapy Daily Treatment Note  Date:  5/3/2019    Patient Name:  Natalee Saenz    :  1940  MRN: [id number]  Restrictions/Precautions:    Medical/Treatment Diagnosis Information:   Diagnosis: R TKA (19; Dr. Adeola Shukla)  Treatment Diagnosis: decreased R knee AROM, strength, endurance, standing tolerance, and decreased balanc    Tracking Information:  Physician Information Referring Practitioner: BEKAH Rai     Plan of Care Sent Date: 19 Signed Received:    Visit Count / Total Visits  2/10    Insurance Approved Visits  [facility] Approved Dates:     Insurance Information PT Insurance Information: [employer], Medicare (med [facility])     Progress Note/G-codes   [x]  Yes  []  No Next Due: #10     Pain level: 5/10 No pain meds     Subjective: had trouble sleeping last night, pain meds don't change the pain. States she has 4 sheets of ex that take a long time. Pt to bring in her list so the therapist can evaluate the HEP     Objective:      Observation: decreased weight bearing on R LE, decreased ROM in gait  Test measurements:  Full ext with over pressure     Exercises:  Exercise/Equipment Resistance/Repetitions Other comments   mat SLR x 5 on R, VCs for quad set  Prone hip ext x 10 B  Side lying hip abd x 10 on R, VCs for form   Quad set with towel under ankle x 10 on R     HEP    bike 5 minutes    LE flexiblity HS 2 x 30'  Knee flex 2 x 30'    LE strength Quad control in long sitting     High level balance  AIREX  SLS 2 x 20'  Tandem 2 x 20'     Gait training X 210 feet, VCs for R heel strike, equal step length    cables TKE 2 pl 2 x 10  Many cues to perform ex correctly                                         Other Therapeutic Activities: 19 Patient was evaluated this date. Patient educated on diagnosis, prognosis, plan of care, benefits, and goals of physical therapy.  Also discussed  frequency and duration of scheduling future physical therapy appointments as well answered all patient questions. Advised pt to use cane when outside of her home and if her knee is hurting, other wise she is cleared to walk in her home without AD.        Home Exercise Program: 4/30/19 The following exercises were performed and added to the pt's home program (educated on appropriate frequency, intensity and duration etc.): quad set, SLR, hip abd, prone leg ext, heel slides     Manual Treatments:  4/30/19: none    Modalities:  4/30/19: none    Timed Code Treatment Minutes:  28    Total Treatment Minutes:  28    Treatment/Activity Tolerance:  [x] Patient tolerated treatment well [] Patient limited by fatigue  [] Patient limited by pain  [] Patient limited by other medical complications  [] Other:     Prognosis: [x] Good [] Fair  [] Poor    Patient Requires Follow-up: [x] Yes  [] No    Plan:   [x] Continue per plan of care [] Alter current plan (see comments)  [] Plan of care initiated [] Hold pending MD visit [] Discharge    Plan for Next Session:  Knee AROM, strength, endurance, balance, gait training, manual PRN, MOC    Electronically signed by:  Boom Garcia, PT, DPT

## 2019-05-07 ENCOUNTER — HOSPITAL ENCOUNTER (OUTPATIENT)
Dept: PHYSICAL THERAPY | Age: 79
Setting detail: THERAPIES SERIES
Discharge: HOME | End: 2019-05-07
Payer: MEDICARE

## 2019-05-07 PROCEDURE — 97110 THERAPEUTIC EXERCISES: CPT

## 2019-05-07 NOTE — FLOWSHEET NOTE
Physical Therapy Daily Treatment Note  Date:  2019    Patient Name:  Tana Macdonald    :  1940  MRN: [id number]  Restrictions/Precautions:    Medical/Treatment Diagnosis Information:   Diagnosis: R TKA (19; Dr. Albaro Shaw)  Treatment Diagnosis: decreased R knee AROM, strength, endurance, standing tolerance, and decreased balanc    Tracking Information:  Physician Information Referring Practitioner: BEKAH Hodgson     Plan of Care Sent Date: 19 Signed Received:    Visit Count / Total Visits  3/10    Insurance Approved Visits  [facility] Approved Dates:     Insurance Information PT Insurance Information: Humana, Medicare (med nec)     Progress Note/G-codes   []  Yes  [x]  No Next Due: #10     Pain level: 0/10 No pain meds     Subjective:   Pt reports she is doing well. Didn't have too busy of a weekend, but wants to have a small condensed list of HEP. Objective:      Observation:   : pt amb into clinic without AD  decreased weight bearing on R LE, decreased ROM in gait  Test measurements:  Full ext with over pressure     Exercises:  Exercise/Equipment Resistance/Repetitions Other comments   mat      HEP    bike 5 minutes    LE flexiblity     LE strength Quad control in long sitting NV    Squats at ballet barre with B UE support x 10      TG:  Squats x 15  single leg squats on R x 10    High level balance      Shuttle: Forward step ups x 10 B with B finger tip support  Lateral step ups x 10 B with B UE support     Gait training     cables TKE 2.5 pl 2 x 10, no UE support  3 way hip x 10 B with 1 plate, B UE support                                           Other Therapeutic Activities: 19 Patient was evaluated this date. Patient educated on diagnosis, prognosis, plan of care, benefits, and goals of physical therapy. Also discussed  frequency and duration of scheduling future physical therapy appointments as well answered all patient questions.  Advised pt to use cane when outside of her home and if her knee is hurting, other wise she is cleared to walk in her home without AD.        Home Exercise Program:   5/7: issued condensed HEP of HS stretch, knee flexion stretch, TKE, squat, heel slide, and quad set, issued blue tband   4/30/19 The following exercises were performed and added to the pt's home program (educated on appropriate frequency, intensity and duration etc.): quad set, SLR, hip abd, prone leg ext, heel slides     Manual Treatments:  4/30/19: none    Modalities:  4/30/19: none    Timed Code Treatment Minutes:  29    Total Treatment Minutes: 29    Treatment/Activity Tolerance:  [x] Patient tolerated treatment well [] Patient limited by fatigue  [] Patient limited by pain  [] Patient limited by other medical complications  [] Other:     Prognosis: [x] Good [] Fair  [] Poor    Patient Requires Follow-up: [x] Yes  [] No    Plan:   [x] Continue per plan of care [] Alter current plan (see comments)  [] Plan of care initiated [] Hold pending MD visit [] Discharge    Plan for Next Session:  Knee AROM, strength, endurance, balance, gait training, manual PRN, MOC    Electronically signed by:  Rachelle Mariano, PT, DPT

## 2019-05-10 ENCOUNTER — HOSPITAL ENCOUNTER (OUTPATIENT)
Dept: PHYSICAL THERAPY | Age: 79
Setting detail: THERAPIES SERIES
Discharge: HOME | End: 2019-05-10
Payer: MEDICARE

## 2019-05-10 ENCOUNTER — TELEPHONE (OUTPATIENT)
Dept: ENT CLINIC | Age: 79
End: 2019-05-10

## 2019-05-10 DIAGNOSIS — H65.02 ACUTE SEROUS OTITIS MEDIA OF LEFT EAR, RECURRENCE NOT SPECIFIED: Primary | ICD-10-CM

## 2019-05-10 PROCEDURE — 97110 THERAPEUTIC EXERCISES: CPT

## 2019-05-10 RX ORDER — AZITHROMYCIN 250 MG/1
TABLET, FILM COATED ORAL
Qty: 1 PACKET | Refills: 0 | Status: SHIPPED | OUTPATIENT
Start: 2019-05-10 | End: 2019-07-12 | Stop reason: ALTCHOICE

## 2019-05-10 NOTE — FLOWSHEET NOTE
Physical Therapy Daily Treatment Note  Date:  5/10/2019    Patient Name:  Evangelina Villagomez    :  1940  MRN: [id number]  Restrictions/Precautions:    Medical/Treatment Diagnosis Information:   Diagnosis: R TKA (19; Dr. Rose Sarah)  Treatment Diagnosis: decreased R knee AROM, strength, endurance, standing tolerance, and decreased balanc    Tracking Information:  Physician Information Referring Practitioner: BEKAH Dominguez     Plan of Care Sent Date: 19 Signed Received:    Visit Count / Total Visits  4/10    Insurance Approved Visits  mayra Sims Approved Dates:     Insurance Information PT Insurance Information: Humana, Medicare (med nec)     Progress Note/G-codes   []  Yes  [x]  No Next Due: #10     Pain level: 0/10 No pain meds     Subjective:   Pt reports she is doing well. Just walked from the main parking lot to the PT gym. Hasn't been sleeping well. Objective:      Observation:   : pt amb into clinic without AD  decreased weight bearing on R LE, decreased ROM in gait  Test measurements:    5/10: AROM R knee 122 deg to 0 deg post manual   Full ext with over pressure     Exercises:  Exercise/Equipment Resistance/Repetitions Other comments   mat SLR with quad set on R x 10 - no ext lag   Side lying hip abd x 10 B, VCs for form          HEP    bike 5 minutes, bike #3, seat 9     LE flexiblity     LE strength LAQ with 2# weight on R x 15     Hip abd and add with green band and green ball x 10 each with 3 sec holds    Squats at ballet barre with B UE support x 10    Lateral lunges x 10 B with B UE support           High level balance  AIRE[employer]:SLS 2 x 20\"Tandem 3 x 10\" B no UE support         Gait training     cables                                           Other Therapeutic Activities: 19 Patient was evaluated this date. Patient educated on diagnosis, prognosis, plan of care, benefits, and goals of physical therapy.  Also discussed  frequency and duration of scheduling future physical therapy appointments as well answered all patient questions. Advised pt to use cane when outside of her home and if her knee is hurting, other wise she is cleared to walk in her home without AD.        Home Exercise Program:   5/7: issued condensed HEP of HS stretch, knee flexion stretch, TKE, squat, heel slide, and quad set, issued blue tband   4/30/19 The following exercises were performed and added to the pt's home program (educated on appropriate frequency, intensity and duration etc.): quad set, SLR, hip abd, prone leg ext, heel slides     Manual Treatments:    5/10: grade 3 mobs into ext x 5 minutes  4/30/19: none    Modalities:  4/30/19: none    Timed Code Treatment Minutes:  30    Total Treatment Minutes: 30    Treatment/Activity Tolerance:  [x] Patient tolerated treatment well [] Patient limited by fatigue  [] Patient limited by pain  [] Patient limited by other medical complications  [x] Other: AROM progressing well, no ext lag this date, focus on strength and endurance     Prognosis: [x] Good [] Fair  [] Poor    Patient Requires Follow-up: [x] Yes  [] No    Plan:   [x] Continue per plan of care [] Alter current plan (see comments)  [] Plan of care initiated [] Hold pending MD visit [] Discharge    Plan for Next Session:  Knee AROM, strength, endurance, balance, gait training, manual PRN, MOC    Electronically signed by:  Giorgi Neri, PT, DPT

## 2019-05-10 NOTE — TELEPHONE ENCOUNTER
Informed pt of the rx at the pharmacy and also told pt to call Monday to be transferred to have a hearing test scheduled.

## 2019-05-10 NOTE — TELEPHONE ENCOUNTER
Patient called to let us know her left ear is still bothering her after the steroid.      Please advise

## 2019-05-14 ENCOUNTER — OFFICE VISIT (OUTPATIENT)
Dept: ORTHOPEDIC SURGERY | Age: 79
End: 2019-05-14

## 2019-05-14 ENCOUNTER — TELEPHONE (OUTPATIENT)
Dept: ORTHOPEDIC SURGERY | Age: 79
End: 2019-05-14

## 2019-05-14 ENCOUNTER — APPOINTMENT (OUTPATIENT)
Dept: PHYSICAL THERAPY | Age: 79
End: 2019-05-14
Payer: MEDICARE

## 2019-05-14 VITALS — TEMPERATURE: 97.8 F | WEIGHT: 148 LBS | BODY MASS INDEX: 26.22 KG/M2 | HEIGHT: 63 IN

## 2019-05-14 DIAGNOSIS — Z96.651 HISTORY OF TOTAL KNEE ARTHROPLASTY, RIGHT: Primary | ICD-10-CM

## 2019-05-14 PROCEDURE — APPSS15 APP SPLIT SHARED TIME 0-15 MINUTES: Performed by: PHYSICIAN ASSISTANT

## 2019-05-14 PROCEDURE — 99024 POSTOP FOLLOW-UP VISIT: CPT | Performed by: PHYSICIAN ASSISTANT

## 2019-05-14 NOTE — PROGRESS NOTES
81 MG EC tablet Take 1 tablet by mouth 2 times daily for 14 days Please avoid missing doses. 28 tablet 0    clonazePAM (KLONOPIN) 1 MG tablet TAKE ONE-HALF TO ONE TABLET BY MOUTH ONCE NIGHTLY. 30 tablet 2    pravastatin (PRAVACHOL) 40 MG tablet Take 1 tablet by mouth nightly 90 tablet 3    Multiple Vitamins-Minerals (CENTRUM SILVER PO) Take by mouth      vitamin B-12 (CYANOCOBALAMIN) 1000 MCG tablet Take 1,000 mcg by mouth daily      vitamin C (ASCORBIC ACID) 500 MG tablet Take 500 mg by mouth daily      vitamin D (CHOLECALCIFEROL) 1000 UNIT TABS tablet Take 1,000 Units by mouth daily       No current facility-administered medications for this visit. Objective:   She  is alert, oriented x 3, pleasant, well nourished, developed and in no acute distress. Temp 97.8 °F (36.6 °C) (Temporal)   Ht 5' 3\" (1.6 m)   Wt 148 lb (67.1 kg)   LMP  (Exact Date)   BMI 26.22 kg/m²    KNEE EXAM:  Examination of the right knee shows: There is no erythema. There is no drainage. There is mild soft tissue swelling. ROM-  Extension  3          -   Flexion      125   Extensor Mechanism is intact. Calf is not swollen. X Rays: was not performed in the office today:          Diagnosis:        ICD-10-CM    1. History of total knee arthroplasty, right-4/1/2019 Z96.651         Assessment/plan:        Clinically doing very well now 6 weeks post total knee arthroplasty. Continue with physical therapy progressing towards a home exercise program.   Rest, Ice, Compression and Elevation  Activity restriction/ Modification discussed. Discussed the need for future dental prophylaxis. Follow Up: 2 months. Call or return to clinic prn if these symptoms worsen or fail to improve as anticipated.

## 2019-05-14 NOTE — TELEPHONE ENCOUNTER
Patient called states she forgot to ask if she should be icing her knee everyday or not?     Please call patient to advise:  [phone]

## 2019-05-15 ENCOUNTER — PROCEDURE VISIT (OUTPATIENT)
Dept: AUDIOLOGY | Age: 79
End: 2019-05-15
Payer: MEDICARE

## 2019-05-15 DIAGNOSIS — H91.13 PRESBYCUSIS OF BOTH EARS: Primary | ICD-10-CM

## 2019-05-15 PROCEDURE — 92550 TYMPANOMETRY & REFLEX THRESH: CPT | Performed by: AUDIOLOGIST

## 2019-05-15 PROCEDURE — 92557 COMPREHENSIVE HEARING TEST: CPT | Performed by: AUDIOLOGIST

## 2019-05-15 PROCEDURE — G8428 CUR MEDS NOT DOCUMENT: HCPCS | Performed by: AUDIOLOGIST

## 2019-05-15 NOTE — PROGRESS NOTES
Subjective:      Patient ID: Sunny Gaston is a 77 y.o. female seen for a hearing evaluation following Eustachian tube dysfunction and fluid buildup in her left ear. Patient does not complain of hearing loss. Objective:   Tympanometry, acoustic reflexes, air and bone conduction, speech reception thresholds, word recognition were all measured. Assessment:       Air and bone conduction measurements revealed a mild to moderately severe bilateral sensorineural hearing loss. The hearing loss is symmetrical.    Tympanometry results revealed Type A tymps with normal pressure, volume, and compliance in both ears, with present reflexes in both ears as well. Plan:    Patient does not complain of any trouble hearing at this time. She was advised to have her hearing tested annually for monitoring purposes. MIRACLE Sheth

## 2019-05-17 ENCOUNTER — TELEPHONE (OUTPATIENT)
Dept: ORTHOPEDIC SURGERY | Age: 79
End: 2019-05-17

## 2019-05-17 DIAGNOSIS — M17.11 ARTHRITIS OF RIGHT KNEE: ICD-10-CM

## 2019-05-20 RX ORDER — OXYCODONE HYDROCHLORIDE AND ACETAMINOPHEN 5; 325 MG/1; MG/1
1 TABLET ORAL EVERY 6 HOURS PRN
Qty: 24 TABLET | Refills: 0 | Status: SHIPPED | OUTPATIENT
Start: 2019-05-20 | End: 2019-05-27

## 2019-05-21 ENCOUNTER — HOSPITAL ENCOUNTER (OUTPATIENT)
Dept: PHYSICAL THERAPY | Age: 79
Setting detail: THERAPIES SERIES
Discharge: HOME | End: 2019-05-21
Payer: MEDICARE

## 2019-05-21 PROCEDURE — 97530 THERAPEUTIC ACTIVITIES: CPT

## 2019-05-21 PROCEDURE — 97110 THERAPEUTIC EXERCISES: CPT

## 2019-05-21 NOTE — FLOWSHEET NOTE
Physical Therapy Daily Treatment Note  Date:  2019    Patient Name:  Vera Chamberlain    :  1940  MRN: [id number]  Restrictions/Precautions:    Medical/Treatment Diagnosis Information:   Diagnosis: R TKA (19; Dr. Car Mcgee)  Treatment Diagnosis: decreased R knee AROM, strength, endurance, standing tolerance, and decreased balanc    Tracking Information:  Physician Information Referring Practitioner: BEKAH Whitt     Plan of Care Sent Date: 19 Signed Received:    Visit Count / Total Visits  5/10    Insurance Approved Visits  med Kenneth Pace Approved Dates:     Insurance Information PT Insurance Information: Humana, Medicare (med nec)     Progress Note/G-codes   []  Yes  [x]  No Next Due: #10     Pain level: 2/10 No pain meds     Subjective:   Pt reports she is doing well. Notes that there are no changes since last time. Small increase in pain after walking around at [facility] the other day. Objective:      Observation:   : pt amb into clinic without AD  decreased weight bearing on R LE, decreased ROM in gait  Test measurements:    5/10: AROM R knee 122 deg to 0 deg post manual   Full ext with over pressure     Exercises:  Exercise/Equipment Resistance/Repetitions Other comments   mat    SL STS on 22\" mat on R 2x8 (L foot on R foot for support, weight through R LE) with bilateral UE for guidance     Squat taps to 24\" table x10, x10 with purple band around knees      HS curl with purple TB 2x10 R        HEP                                     VC for \"sitting back\" for glute activation. VC for eccentric control    bike 5 minutes, bike #3, seat 9     LE flexiblity     LE strength      TG:single leg squats on R 2x15 to ~ 70° of knee flexion    High level balance      Shuttle:Step to to SLS x10 B with unilateral fingertip support    Gait training     cables                                           Other Therapeutic Activities: 19 Patient was evaluated this date.  Patient educated on diagnosis, prognosis, plan of care, benefits, and goals of physical therapy. Also discussed  frequency and duration of scheduling future physical therapy appointments as well answered all patient questions. Advised pt to use cane when outside of her home and if her knee is hurting, other wise she is cleared to walk in her home without AD. Home Exercise Program:   5/21: Updated HEP to include - lateral band walks with straight leg and in a knees flexed position, SL balance, squat taps, HS curl with Tb, and SL hip abd - green and purple TB issued. HO given. Kept HS stretch and squat exercises from 5/7 HEP in new HEP.  5/7: issued condensed HEP of HS stretch, knee flexion stretch, TKE, squat, heel slide, and quad set, issued blue tband   4/30/19 The following exercises were performed and added to the pt's home program (educated on appropriate frequency, intensity and duration etc.): quad set, SLR, hip abd, prone leg ext, heel slides     Manual Treatments:    5/10: grade 3 mobs into ext x 5 minutes  4/30/19: none    Modalities:  4/30/19: none    Timed Code Treatment Minutes:  33    Total Treatment Minutes: 33    Treatment/Activity Tolerance:  [x] Patient tolerated treatment well [] Patient limited by fatigue  [] Patient limited by pain  [] Patient limited by other medical complications  [x] Other: Strength progressing well, but still some deficits in SL balance and strengthening activities. D/t pt progression and request, sessions backed down to once a week and pt will use updated HEP to strengthen appropriately at home. Prognosis: [x] Good [] Fair  [] Poor    Patient Requires Follow-up: [x] Yes  [] No    Plan:   [x] Continue per plan of care [] Alter current plan (see comments)  [] Plan of care initiated [] Hold pending MD visit [] Discharge    Plan for Next Session:  Knee AROM, strength, endurance, balance, gait training, manual PRN, MOC. SL strengthening and balance.     Electronically signed by:  Anay Graham Lorenza Benavidez, SPT    Therapist was present, directed the patient's care, made skilled judgement, and was responsible for assessment and treatment of the patient.

## 2019-05-29 RX ORDER — CELECOXIB 200 MG/1
CAPSULE ORAL
Qty: 90 CAPSULE | Refills: 0 | Status: SHIPPED | OUTPATIENT
Start: 2019-05-29 | End: 2020-01-02 | Stop reason: SDUPTHER

## 2019-05-31 ENCOUNTER — HOSPITAL ENCOUNTER (OUTPATIENT)
Dept: PHYSICAL THERAPY | Age: 79
Setting detail: THERAPIES SERIES
Discharge: HOME | End: 2019-05-31
Payer: MEDICARE

## 2019-05-31 PROCEDURE — 97110 THERAPEUTIC EXERCISES: CPT

## 2019-05-31 NOTE — FLOWSHEET NOTE
Physical Therapy Daily Treatment Note  Date:  2019    Patient Name:  Harles Duverney    :  1940  MRN: [id number]  Restrictions/Precautions:    Medical/Treatment Diagnosis Information:   Diagnosis: R TKA (19; Dr. Carmita Boone)  Treatment Diagnosis: decreased R knee AROM, strength, endurance, standing tolerance, and decreased balanc    Tracking Information:  Physician Information Referring Practitioner: BEKAH Kirkland     Plan of Care Sent Date: 19 Signed Received:    Visit Count / Total Visits  6/10    Insurance Approved Visits  med Dragan Penta Approved Dates:     Insurance Information PT Insurance Information: Humana, Medicare (med Dragan Penta)  $40 copay    Progress Note/G-codes   []  Yes  [x]  No Next Due: #10     Pain level: 2/10 No pain meds     Subjective:   Says she isn't doing too bad overall. Feels she can do a lot of stuff at home     Objective:      Observation:   : pt amb into clinic without AD  decreased weight bearing on R LE, decreased ROM in gait  Test measurements:    5/10: AROM R knee 122 deg to 0 deg post manual   Full ext with over pressure     Exercises:  Exercise/Equipment Resistance/Repetitions Other comments   mat    SL STS on 22\" mat on R 2x8 (L foot on R foot for support, weight through R LE) with bilateral UE for guidance     Squat taps to 24\" table x10, x10 with purple band around knees      HS curl with purple TB 2x10 R        HEP                                     VC for \"sitting back\" for glute activation. VC for eccentric control    bike 5 minutes, bike #3, seat 9     LE flexiblity     LE strength      TG:single leg squats on R 2x15 to ~ 70° of knee flexion    High level balance      Shuttle:Step to to SLS x10 B with unilateral fingertip support    Lateral band walk, x 2 laps distal quad, x 2 laps at ankle     Gait training     cables                                           Other Therapeutic Activities: 19 Patient was evaluated this date.  Patient educated on diagnosis, prognosis, plan of care, benefits, and goals of physical therapy. Also discussed  frequency and duration of scheduling future physical therapy appointments as well answered all patient questions. Advised pt to use cane when outside of her home and if her knee is hurting, other wise she is cleared to walk in her home without AD. Home Exercise Program:   5/21: Updated HEP to include - lateral band walks with straight leg and in a knees flexed position, SL balance, squat taps, HS curl with Tb, and SL hip abd - green and purple TB issued. HO given. Kept HS stretch and squat exercises from 5/7 HEP in new HEP.  5/7: issued condensed HEP of HS stretch, knee flexion stretch, TKE, squat, heel slide, and quad set, issued blue tband   4/30/19 The following exercises were performed and added to the pt's home program (educated on appropriate frequency, intensity and duration etc.): quad set, SLR, hip abd, prone leg ext, heel slides     Manual Treatments:    5/10: grade 3 mobs into ext x 5 minutes  4/30/19: none    Modalities:  4/30/19: none    Timed Code Treatment Minutes:  32    Total Treatment Minutes: 32    Treatment/Activity Tolerance:  [x] Patient tolerated treatment well [] Patient limited by fatigue  [] Patient limited by pain  [] Patient limited by other medical complications  [x] Other: Strength progressing well, but still some deficits in SL balance and strengthening activities. D/t pt progression and request, sessions backed down to once a week and pt will use updated HEP to strengthen appropriately at home. Prognosis: [x] Good [] Fair  [] Poor    Patient Requires Follow-up: [x] Yes  [] No    Plan:   [x] Continue per plan of care [] Alter current plan (see comments)  [] Plan of care initiated [] Hold pending MD visit [] Discharge    Plan for Next Session:  Knee AROM, strength, endurance, balance, gait training, manual PRN, MOC. SL strengthening and balance.     Electronically signed by:

## 2019-06-04 ENCOUNTER — APPOINTMENT (OUTPATIENT)
Dept: PHYSICAL THERAPY | Age: 79
End: 2019-06-04
Payer: MEDICARE

## 2019-06-11 ENCOUNTER — HOSPITAL ENCOUNTER (OUTPATIENT)
Dept: PHYSICAL THERAPY | Age: 79
Setting detail: THERAPIES SERIES
Discharge: HOME | End: 2019-06-11
Payer: MEDICARE

## 2019-06-11 PROCEDURE — 97110 THERAPEUTIC EXERCISES: CPT

## 2019-06-11 PROCEDURE — 97530 THERAPEUTIC ACTIVITIES: CPT

## 2019-06-11 NOTE — FLOWSHEET NOTE
Physical Therapy Daily Treatment Note  Date:  2019    Patient Name:  Jean-Paul Caruso    :  1940  MRN: [id number]  Restrictions/Precautions:    Medical/Treatment Diagnosis Information:   Diagnosis: R TKA (19; Dr. Brenna Louise)  Treatment Diagnosis: decreased R knee AROM, strength, endurance, standing tolerance, and decreased balanc    Tracking Information:  Physician Information Referring Practitioner: BEKAH Jones     Plan of Care Sent Date: 19 Signed Received:    Visit Count / Total Visits  7/10    Insurance Approved Visits  [facility] Approved Dates:     Insurance Information PT Insurance Information: Humana, Medicare ([facility])  $40 copay    Progress Note/G-codes   [x]  Yes  []  No Next Due: none     Pain level: 0/10 No pain meds     Subjective:   Pt reports she is ready for discharge. Believes she has improved about 80% since beginning therapy. Due to high copay and ability to complete exercises at home she will be discharged today. Objective:      Observation:   : pt amb into clinic without AD  decreased weight bearing on R LE, decreased ROM in gait  Test measurements:    5/10: AROM R knee 122 deg to 0 deg post manual   Full ext with over pressure     Exercises:  Exercise/Equipment Resistance/Repetitions Other comments   mat       HEP                                     VC for \"sitting back\" for glute activation.         VC for eccentric control    bike    LE flexiblity    LE strength    High level balance     Gait training    [facility] Ascend/descend 2 flights of stairs with reciprocal pattern and 1 HR support     Elliptical 1.5 mins   TRX squats x 10  TRX single leg squats x 10 B  Walking forward lunges with 8# kettle bells B x 1 lap of 30 feet  Hip abd 50# x 15   Hip add 50# x 15  Leg press 80# x 15  Leg ext 10#  x15  Ham curl 20# x 15                                     Other Therapeutic Activities:   : Reassessed goals, discussed progress made and areas remaining for improvement, issued outcome measure and [employer] pass and educated on use   4/30/19 Patient was evaluated this date. Patient educated on diagnosis, prognosis, plan of care, benefits, and goals of physical therapy. Also discussed  frequency and duration of scheduling future physical therapy appointments as well answered all patient questions. Advised pt to use cane when outside of her home and if her knee is hurting, other wise she is cleared to walk in her home without AD. Home Exercise Program:   5/21: Updated HEP to include - lateral band walks with straight leg and in a knees flexed position, SL balance, squat taps, HS curl with Tb, and SL hip abd - green and purple TB issued. HO given. Kept HS stretch and squat exercises from 5/7 HEP in new HEP.  5/7: issued condensed HEP of HS stretch, knee flexion stretch, TKE, squat, heel slide, and quad set, issued blue tband   4/30/19 The following exercises were performed and added to the pt's home program (educated on appropriate frequency, intensity and duration etc.): quad set, SLR, hip abd, prone leg ext, heel slides     Manual Treatments:    5/10: grade 3 mobs into ext x 5 minutes  4/30/19: none    Modalities:  4/30/19: none    Timed Code Treatment Minutes:   40    Total Treatment Minutes: 40    Treatment/Activity Tolerance:  [x] Patient tolerated treatment well [] Patient limited by fatigue  [] Patient limited by pain  [] Patient limited by other medical complications  [x] Other: see DC       Prognosis: [x] Good [] Fair  [] Poor    Patient Requires Follow-up: [] Yes  [x] No    Plan:   [] Continue per plan of care [] Alter current plan (see comments)  [] Plan of care initiated [] Hold pending MD visit [x] Discharge    Plan for Next Session:  Knee AROM, strength, endurance, balance, gait training, manual PRN, MOC. SL strengthening and balance.     Electronically signed by:  Avery Cohen, PT, DPT

## 2019-06-11 NOTE — DISCHARGE SUMMARY
Outpatient Physical Therapy    Phone: [phone] Fax: [phone]    Physical Therapy Discharge Note  Date: 2019        Patient Name:  Shantal Liu    :  1940  MRN: [id number]  Restrictions/Precautions:    Medical/Treatment Diagnosis Information:   · Diagnosis: R TKA (19; Dr. Kinjal Malave)  · Treatment Diagnosis: decreased R knee AROM, strength, endurance, standing tolerance, and decreased balanc     Tracking Information:       Physician Information Referring Practitioner: BEKAH Yanez     Plan of Care Sent Date: 19 Signed Received:    Visit Count / Total Visits  7/10     Insurance Approved Visits  [facility] Approved Dates:     Insurance Information PT Insurance Information: [employer], Medicare ([facility])  $40 copay    Progress Note/G-codes   []  Yes                 [x]  No Next Due: #10      Pain level:    0  /10       No pain meds       Time Period for Report: 19-19   Cancels/No-shows to date:      Plan of Care/Treatment to date:  X Therapeutic Exercise      X Modalities:  X Therapeutic Activity       ? Ultrasound    X Gait Training        ? Cervical Traction   X Neuromuscular Re-education      X Cold/hotpack    X Instruction in HEP       ? Lumbar Traction  X Manual Therapy        ? Electrical Stimulation            ? Aquatic Therapy        ? Iontophoresis            ? Lymphedema management  ? Women's Health     Other:  ? Vestibular Rehab        ?    ?  Needed                        Significant Findings At Last Visit/Comments:    Subjective:    Pt reports she is ready for discharge. Believes she has improved about 80% since beginning therapy. Due to high copay and ability to complete exercises at home she will be discharged today.           Objective:            AROM LLE (degrees)  LLE General AROM: supine, goni  L Knee Flexion 0-145: 125 deg  L Knee Extension 0: 0 deg  AROM RLE (degrees)  RLE General AROM: supine, goni  R Knee Flexion 0-145: 130 deg  R Knee Extension 0: 0 deg  Strength RLE  R Hip Flexion: 5/5  R Hip Extension: 5/5  R Hip ABduction: 5/5  R Hip ADduction: 5/5  R Hip Internal Rotation: 5/5  R Hip External Rotation: 5/5  R Knee Flexion: 5/5  R Knee Extension: 5/5  R Ankle Dorsiflexion: 5/5  Strength LLE  L Hip Flexion: 5/5  L Hip Internal Rotation: 5/5  L Hip External Rotation: 5/5  L Knee Flexion: 5/5  L Knee Extension: 5/5  L Ankle Dorsiflexion: 5/5     Additional Measures  Special Tests: 30 sec STS: 11 reps               Balance  Posture: Good  Sitting - Static: Good  Sitting - Dynamic: Good  Standing - Static: Good  Standing - Dynamic: Good  Tandem Stance R Leg: 10  Tandem Stance L Leg: 10  Single Leg Stance R Leg: 10  Single Leg Stance L Leg: 10            Functional Outcome Measures:  LEFS Total Score: 76            Assessment:  Conditions Requiring Skilled Therapeutic Intervention  Body structures, Functions, Activity limitations: Decreased functional mobility , Decreased strength, Decreased high-level IADLs, Decreased endurance, Decreased balance, Decreased ADL status, Decreased ROM, Increased Pain  Assessment: Pt is a 78 y/o female who presented with R TKA completed on 4/2/19. She has been seen for 7 visits as of this date and has progressed very well. She has now returned to all ADLs and community activities without pain or limitation. AROM, strength, balance and endurance have all improved and she has met all but one goal. She has been taught a comprehensive HEP and will be discharged this date. Pt plans to continue exercising in [facility].    Treatment Diagnosis: decreased R knee AROM, strength, endurance, standing tolerance, and decreased balanc  Prognosis: Good  REQUIRES PT FOLLOW UP: No    Plan:       Discharge       Progress towards goals:    Long term goals  Time Frame for Long term goals : 5 weeks:   Long term goal 1: Pt increase R knee AROM by 5 deg or greater to improve ability to squat and perform activites around the home without limitations or pain. - MET  Long term goal 2: Pt improve R knee strength to 5/5 to increase ability to walk for long distances without discomfort. - MET  Long term goal 3: Pt will report pain at 2/10 or less to progress towards PLOF of no pain wth ADLs. - MET  Long term goal 4: Pt will demo ability to perform SLS for 10 sec or greater on R LE to demo increased balance on stairs, unfamiliar surfaces, and increase safety. - MET  Long term goal 5: Pt will increase 30 sec STS reps by 5 to demo increase in LE endurance. - PROGRESSING (improved by 3)    Current Frequency/Duration:  # Days per week: ? 1 day # Weeks: ? 1 week ? 4 weeks      X 2 days   ? 2 weeks X 5 weeks      ? 3 days   ? 3 weeks ? 6 weeks     Rehab Potential: ? Excellent X Good ? Fair  ? Poor     Goal Status:  ? Achieved X Partially Achieved  ? Not Achieved     Patient Status: ? Continue per initial plan of Care     X Patient now discharged     ? Additional visits requested, Please re-certify for additional visits:      Requested frequency/duration:  X/week for weeks    Electronically signed by:  Shashank Kerr PT, DPT    If you have any questions or concerns, please don't hesitate to call.   Thank you for your referral.    Physician Signature:________________________________Date:__________________  By signing above, therapists plan is approved by physician

## 2019-07-12 ENCOUNTER — OFFICE VISIT (OUTPATIENT)
Dept: FAMILY MEDICINE CLINIC | Age: 79
End: 2019-07-12
Payer: MEDICARE

## 2019-07-12 VITALS
HEART RATE: 58 BPM | HEIGHT: 63 IN | SYSTOLIC BLOOD PRESSURE: 124 MMHG | DIASTOLIC BLOOD PRESSURE: 70 MMHG | BODY MASS INDEX: 25.69 KG/M2 | WEIGHT: 145 LBS | RESPIRATION RATE: 12 BRPM

## 2019-07-12 DIAGNOSIS — R10.30 LOWER ABDOMINAL PAIN: ICD-10-CM

## 2019-07-12 DIAGNOSIS — N32.81 OAB (OVERACTIVE BLADDER): ICD-10-CM

## 2019-07-12 DIAGNOSIS — R35.1 NOCTURIA: ICD-10-CM

## 2019-07-12 DIAGNOSIS — Z96.651 STATUS POST RIGHT KNEE REPLACEMENT: ICD-10-CM

## 2019-07-12 DIAGNOSIS — R53.83 FATIGUE, UNSPECIFIED TYPE: Primary | ICD-10-CM

## 2019-07-12 LAB
BILIRUBIN, POC: NORMAL
BLOOD URINE, POC: NORMAL
CLARITY, POC: CLEAR
COLOR, POC: YELLOW
GLUCOSE URINE, POC: NORMAL
KETONES, POC: NORMAL
LEUKOCYTE EST, POC: NORMAL
NITRITE, POC: NORMAL
PH, POC: 7
PROTEIN, POC: NORMAL
SPECIFIC GRAVITY, POC: 1.02
UROBILINOGEN, POC: 0.2

## 2019-07-12 PROCEDURE — 1090F PRES/ABSN URINE INCON ASSESS: CPT | Performed by: FAMILY MEDICINE

## 2019-07-12 PROCEDURE — 4040F PNEUMOC VAC/ADMIN/RCVD: CPT | Performed by: FAMILY MEDICINE

## 2019-07-12 PROCEDURE — G8427 DOCREV CUR MEDS BY ELIG CLIN: HCPCS | Performed by: FAMILY MEDICINE

## 2019-07-12 PROCEDURE — 81002 URINALYSIS NONAUTO W/O SCOPE: CPT | Performed by: FAMILY MEDICINE

## 2019-07-12 PROCEDURE — G8399 PT W/DXA RESULTS DOCUMENT: HCPCS | Performed by: FAMILY MEDICINE

## 2019-07-12 PROCEDURE — 1123F ACP DISCUSS/DSCN MKR DOCD: CPT | Performed by: FAMILY MEDICINE

## 2019-07-12 PROCEDURE — 4004F PT TOBACCO SCREEN RCVD TLK: CPT | Performed by: FAMILY MEDICINE

## 2019-07-12 PROCEDURE — 99214 OFFICE O/P EST MOD 30 MIN: CPT | Performed by: FAMILY MEDICINE

## 2019-07-12 PROCEDURE — G8417 CALC BMI ABV UP PARAM F/U: HCPCS | Performed by: FAMILY MEDICINE

## 2019-07-12 RX ORDER — OXYBUTYNIN CHLORIDE 5 MG/1
5 TABLET ORAL NIGHTLY
Qty: 30 TABLET | Refills: 2 | Status: SHIPPED | OUTPATIENT
Start: 2019-07-12 | End: 2019-12-26 | Stop reason: ALTCHOICE

## 2019-07-12 NOTE — PROGRESS NOTES
tablet 2    pravastatin (PRAVACHOL) 40 MG tablet Take 1 tablet by mouth nightly 90 tablet 3    Multiple Vitamins-Minerals (CENTRUM SILVER PO) Take by mouth      vitamin B-12 (CYANOCOBALAMIN) 1000 MCG tablet Take 1,000 mcg by mouth daily      vitamin C (ASCORBIC ACID) 500 MG tablet Take 500 mg by mouth daily      vitamin D (CHOLECALCIFEROL) 1000 UNIT TABS tablet Take 1,000 Units by mouth daily       No current facility-administered medications for this visit.    had colonoscopy around time of colon surgery for diverticulitis in 2007 - also had gb out  No swelling  Review of Systems    Objective:   Physical Exam   Constitutional: She is oriented to person, place, and time. She appears well-developed and well-nourished. No distress. HENT:   Head: Normocephalic and atraumatic. Eyes: Conjunctivae are normal. No scleral icterus. Cardiovascular: Normal rate, regular rhythm and normal heart sounds. No murmur heard. Pulmonary/Chest: Effort normal and breath sounds normal. No respiratory distress. She has no wheezes. She has no rales. Abdominal: Soft. Bowel sounds are normal. She exhibits no distension and no mass. There is tenderness (tender mid low abdomen to palpation). No cvat   Musculoskeletal: She exhibits no edema. Neurological: She is alert and oriented to person, place, and time. Skin: Skin is warm and dry. Psychiatric: She has a normal mood and affect. Assessment:       Diagnosis Orders   1. Fatigue, unspecified type  Comprehensive Metabolic Panel    CBC Auto Differential    TSH with Reflex    CHROMIUM LEVEL    [place], Blood   2. OAB (overactive bladder)     3. Nocturia  POCT Urinalysis no Micro   4. Status post right knee replacement  CHROMIUM LEVEL    [place], Blood   5.  Lower abdominal pain  CT ABDOMEN PELVIS W IV CONTRAST           Plan:      Ct abd /pelvis w/ contrast  Check tsh, cmp, cbc and ua today  Ditropan 5mg at night to ease nocturia  Avoid bladder irritants  Check cobalt/

## 2019-07-17 ENCOUNTER — OFFICE VISIT (OUTPATIENT)
Dept: ORTHOPEDIC SURGERY | Age: 79
End: 2019-07-17
Payer: MEDICARE

## 2019-07-17 VITALS
HEIGHT: 63 IN | RESPIRATION RATE: 16 BRPM | DIASTOLIC BLOOD PRESSURE: 88 MMHG | WEIGHT: 145 LBS | SYSTOLIC BLOOD PRESSURE: 145 MMHG | TEMPERATURE: 96.7 F | HEART RATE: 56 BPM | BODY MASS INDEX: 25.69 KG/M2

## 2019-07-17 DIAGNOSIS — Z96.651 HISTORY OF TOTAL KNEE ARTHROPLASTY, RIGHT: Primary | ICD-10-CM

## 2019-07-17 PROCEDURE — 1090F PRES/ABSN URINE INCON ASSESS: CPT | Performed by: PHYSICIAN ASSISTANT

## 2019-07-17 PROCEDURE — 4040F PNEUMOC VAC/ADMIN/RCVD: CPT | Performed by: PHYSICIAN ASSISTANT

## 2019-07-17 PROCEDURE — 1123F ACP DISCUSS/DSCN MKR DOCD: CPT | Performed by: PHYSICIAN ASSISTANT

## 2019-07-17 PROCEDURE — 4004F PT TOBACCO SCREEN RCVD TLK: CPT | Performed by: PHYSICIAN ASSISTANT

## 2019-07-17 PROCEDURE — G8427 DOCREV CUR MEDS BY ELIG CLIN: HCPCS | Performed by: PHYSICIAN ASSISTANT

## 2019-07-17 PROCEDURE — G8399 PT W/DXA RESULTS DOCUMENT: HCPCS | Performed by: PHYSICIAN ASSISTANT

## 2019-07-17 PROCEDURE — 99212 OFFICE O/P EST SF 10 MIN: CPT | Performed by: PHYSICIAN ASSISTANT

## 2019-07-17 PROCEDURE — G8417 CALC BMI ABV UP PARAM F/U: HCPCS | Performed by: PHYSICIAN ASSISTANT

## 2019-07-17 NOTE — PROGRESS NOTES
Subjective:      Patient ID: Camila Schwab is a 77 y.o.  female. Chief Complaint   Patient presents with    Follow-up     R TKA 4.1.19        HPI:   She  for follow up visit. S/P  right knee arthroplasty. The date of procedure: 2019. Pain is 0-1/10. Review of Systems:  She denies fever or chills. She  denies any other complaints. Past Medical History:   Diagnosis Date    Arthritis     Diverticulitis     History of nonmelanoma skin cancer 8/3/2016    Hyperlipidemia     Osteoarthritis     Restless legs syndrome     Wrist fracture, left 2017    TRIPPED AT SON'S HOUSE       Family History   Problem Relation Age of Onset    Stroke Mother [de-identified]    Alzheimer's Disease Mother 68    Other Father 47        CHOKED AND FELL    Stroke Sister 80        BOTH SISTERS    Other Brother         BOTH  FROM ACCIDENTS       Past Surgical History:   Procedure Laterality Date    CHOLECYSTECTOMY      COLON SURGERY      DIVERTICULITIS, REMOVED DIVERTICULA POCKETS    SKIN CANCER EXCISION Left 2019    face    TOTAL KNEE ARTHROPLASTY Right 2019    RIGHT TOTAL KNEE REPLACEMENT performed by Aicha Mendiola MD at [address] [place] [address] History     Occupational History    Not on file   Tobacco Use    Smoking status: Light Tobacco Smoker     Packs/day: 0.25     Years: 50.00     Pack years: 12.50     Types: Cigarettes    Smokeless tobacco: Never Used    Tobacco comment: 5 cigarettes a day   Substance and Sexual Activity    Alcohol use: No    Drug use: No    Sexual activity: Not Currently     Partners: Male     Comment:        Current Outpatient Medications   Medication Sig Dispense Refill    oxybutynin (DITROPAN) 5 MG tablet Take 1 tablet by mouth nightly 30 tablet 2    celecoxib (CELEBREX) 200 MG capsule TAKE 1 CAPSULE EVERY DAY 90 capsule 0    aspirin EC 81 MG EC tablet Take 1 tablet by mouth 2 times daily for 14 days Please avoid missing doses.  28 tablet 0   

## 2019-07-22 DIAGNOSIS — E78.00 HYPERCHOLESTEREMIA: Primary | ICD-10-CM

## 2019-07-22 RX ORDER — PRAVASTATIN SODIUM 40 MG
TABLET ORAL
Qty: 90 TABLET | Refills: 0 | Status: SHIPPED | OUTPATIENT
Start: 2019-07-22 | End: 2019-09-29 | Stop reason: SDUPTHER

## 2019-07-23 ENCOUNTER — HOSPITAL ENCOUNTER (OUTPATIENT)
Age: 79
Discharge: HOME | End: 2019-07-23
Payer: MEDICARE

## 2019-07-23 ENCOUNTER — HOSPITAL ENCOUNTER (OUTPATIENT)
Dept: CT IMAGING | Age: 79
Discharge: HOME | End: 2019-07-23
Payer: MEDICARE

## 2019-07-23 DIAGNOSIS — R53.83 FATIGUE, UNSPECIFIED TYPE: ICD-10-CM

## 2019-07-23 DIAGNOSIS — R10.30 LOWER ABDOMINAL PAIN: ICD-10-CM

## 2019-07-23 DIAGNOSIS — E78.00 HYPERCHOLESTEREMIA: ICD-10-CM

## 2019-07-23 LAB
A/G RATIO: 1.4 (ref 1.1–2.2)
ALBUMIN SERPL-MCNC: 4.2 G/DL (ref 3.4–5)
ALP BLD-CCNC: 66 U/L (ref 40–129)
ALT SERPL-CCNC: 15 U/L (ref 10–40)
ANION GAP SERPL CALCULATED.3IONS-SCNC: 8 MMOL/L (ref 3–16)
AST SERPL-CCNC: 26 U/L (ref 15–37)
BASOPHILS ABSOLUTE: 0 K/UL (ref 0–0.2)
BASOPHILS RELATIVE PERCENT: 0.5 %
BILIRUB SERPL-MCNC: 0.3 MG/DL (ref 0–1)
BUN BLDV-MCNC: 18 MG/DL (ref 7–20)
CALCIUM SERPL-MCNC: 9.7 MG/DL (ref 8.3–10.6)
CHLORIDE BLD-SCNC: 101 MMOL/L (ref 99–110)
CO2: 31 MMOL/L (ref 21–32)
CREAT SERPL-MCNC: 0.9 MG/DL (ref 0.6–1.2)
EOSINOPHILS ABSOLUTE: 0.1 K/UL (ref 0–0.6)
EOSINOPHILS RELATIVE PERCENT: 1.3 %
GFR AFRICAN AMERICAN: >60
GFR NON-AFRICAN AMERICAN: >60
GLOBULIN: 3 G/DL
GLUCOSE BLD-MCNC: 100 MG/DL (ref 70–99)
HCT VFR BLD CALC: 45.8 % (ref 36–48)
HEMOGLOBIN: 15.1 G/DL (ref 12–16)
LYMPHOCYTES ABSOLUTE: 2.7 K/UL (ref 1–5.1)
LYMPHOCYTES RELATIVE PERCENT: 34.6 %
MCH RBC QN AUTO: 30.1 PG (ref 26–34)
MCHC RBC AUTO-ENTMCNC: 32.9 G/DL (ref 31–36)
MCV RBC AUTO: 91.5 FL (ref 80–100)
MONOCYTES ABSOLUTE: 0.5 K/UL (ref 0–1.3)
MONOCYTES RELATIVE PERCENT: 6.7 %
NEUTROPHILS ABSOLUTE: 4.4 K/UL (ref 1.7–7.7)
NEUTROPHILS RELATIVE PERCENT: 56.9 %
PDW BLD-RTO: 14.2 % (ref 12.4–15.4)
PLATELET # BLD: 190 K/UL (ref 135–450)
PMV BLD AUTO: 9.1 FL (ref 5–10.5)
POTASSIUM SERPL-SCNC: 4.7 MMOL/L (ref 3.5–5.1)
RBC # BLD: 5 M/UL (ref 4–5.2)
SODIUM BLD-SCNC: 140 MMOL/L (ref 136–145)
TOTAL PROTEIN: 7.2 G/DL (ref 6.4–8.2)
TSH REFLEX: 1.6 UIU/ML (ref 0.27–4.2)
WBC # BLD: 7.7 K/UL (ref 4–11)

## 2019-07-23 PROCEDURE — 74177 CT ABD & PELVIS W/CONTRAST: CPT

## 2019-07-23 PROCEDURE — 85025 COMPLETE CBC W/AUTO DIFF WBC: CPT

## 2019-07-23 PROCEDURE — 83018 HEAVY METAL QUAN EACH NES: CPT

## 2019-07-23 PROCEDURE — 80053 COMPREHEN METABOLIC PANEL: CPT

## 2019-07-23 PROCEDURE — 82495 ASSAY OF CHROMIUM: CPT

## 2019-07-23 PROCEDURE — 6360000004 HC RX CONTRAST MEDICATION: Performed by: FAMILY MEDICINE

## 2019-07-23 PROCEDURE — 84443 ASSAY THYROID STIM HORMONE: CPT

## 2019-07-23 PROCEDURE — 36415 COLL VENOUS BLD VENIPUNCTURE: CPT

## 2019-07-23 RX ADMIN — IOPAMIDOL 75 ML: 755 INJECTION, SOLUTION INTRAVENOUS at 16:23

## 2019-07-23 RX ADMIN — IOHEXOL 50 ML: 240 INJECTION, SOLUTION INTRATHECAL; INTRAVASCULAR; INTRAVENOUS; ORAL at 16:23

## 2019-07-25 LAB
CHROMIUM, SERUM: <1 UG/L
COBALT, BLOOD: <1 UG/L

## 2019-08-27 ENCOUNTER — OFFICE VISIT (OUTPATIENT)
Dept: ENT CLINIC | Age: 79
End: 2019-08-27
Payer: MEDICARE

## 2019-08-27 VITALS — SYSTOLIC BLOOD PRESSURE: 144 MMHG | HEART RATE: 57 BPM | DIASTOLIC BLOOD PRESSURE: 78 MMHG | OXYGEN SATURATION: 96 %

## 2019-08-27 DIAGNOSIS — H61.22 IMPACTED CERUMEN OF LEFT EAR: Primary | ICD-10-CM

## 2019-08-27 PROCEDURE — 69210 REMOVE IMPACTED EAR WAX UNI: CPT | Performed by: OTOLARYNGOLOGY

## 2019-08-27 PROCEDURE — G8399 PT W/DXA RESULTS DOCUMENT: HCPCS | Performed by: OTOLARYNGOLOGY

## 2019-08-27 PROCEDURE — 1090F PRES/ABSN URINE INCON ASSESS: CPT | Performed by: OTOLARYNGOLOGY

## 2019-08-27 PROCEDURE — 1123F ACP DISCUSS/DSCN MKR DOCD: CPT | Performed by: OTOLARYNGOLOGY

## 2019-08-27 PROCEDURE — 4040F PNEUMOC VAC/ADMIN/RCVD: CPT | Performed by: OTOLARYNGOLOGY

## 2019-08-27 PROCEDURE — 4004F PT TOBACCO SCREEN RCVD TLK: CPT | Performed by: OTOLARYNGOLOGY

## 2019-08-27 PROCEDURE — G8427 DOCREV CUR MEDS BY ELIG CLIN: HCPCS | Performed by: OTOLARYNGOLOGY

## 2019-08-27 PROCEDURE — G8417 CALC BMI ABV UP PARAM F/U: HCPCS | Performed by: OTOLARYNGOLOGY

## 2019-08-27 NOTE — PROGRESS NOTES
The past few days, the patient has been noticing a sensation of fluid in her left ear with a popping sound. She has had occasional rhinorrhea as well. She has noted some diminished hearing in her left ear but no tinnitus or vertigo. No fevers been noted. She does not smoke. Currently, she appears in no acute distress. Ear examination on the right reveals a normal-appearing tympanic membrane and ear canal.  On the left, however, there is evidence of a significant cerumen accumulation. This is removed with irrigation, suctioning, cleaning with peroxide and upon removal, the tympanic membrane appears normal and the ear canal is now clear. Hearing is improved. Oral examination is unremarkable. The nasal mucosa is normal in appearance as well. The neck is free of any adenopathy, mass, thyroid enlargement.   She will return as needed for any future cerumen removal.

## 2019-10-01 ENCOUNTER — NURSE ONLY (OUTPATIENT)
Dept: FAMILY MEDICINE CLINIC | Age: 79
End: 2019-10-01
Payer: MEDICARE

## 2019-10-01 DIAGNOSIS — E78.00 HYPERCHOLESTEREMIA: Primary | ICD-10-CM

## 2019-10-01 LAB
A/G RATIO: 1.8 (ref 1.1–2.2)
ALBUMIN SERPL-MCNC: 4.4 G/DL (ref 3.4–5)
ALP BLD-CCNC: 61 U/L (ref 40–129)
ALT SERPL-CCNC: 15 U/L (ref 10–40)
ANION GAP SERPL CALCULATED.3IONS-SCNC: 14 MMOL/L (ref 3–16)
AST SERPL-CCNC: 29 U/L (ref 15–37)
BILIRUB SERPL-MCNC: 0.6 MG/DL (ref 0–1)
BUN BLDV-MCNC: 22 MG/DL (ref 7–20)
CALCIUM SERPL-MCNC: 9.3 MG/DL (ref 8.3–10.6)
CHLORIDE BLD-SCNC: 104 MMOL/L (ref 99–110)
CHOLESTEROL, TOTAL: 183 MG/DL (ref 0–199)
CO2: 27 MMOL/L (ref 21–32)
CREAT SERPL-MCNC: 1 MG/DL (ref 0.6–1.2)
GFR AFRICAN AMERICAN: >60
GFR NON-AFRICAN AMERICAN: 53
GLOBULIN: 2.4 G/DL
GLUCOSE BLD-MCNC: 111 MG/DL (ref 70–99)
HDLC SERPL-MCNC: 65 MG/DL (ref 40–60)
LDL CHOLESTEROL CALCULATED: 89 MG/DL
POTASSIUM SERPL-SCNC: 4.3 MMOL/L (ref 3.5–5.1)
SODIUM BLD-SCNC: 145 MMOL/L (ref 136–145)
TOTAL PROTEIN: 6.8 G/DL (ref 6.4–8.2)
TRIGL SERPL-MCNC: 144 MG/DL (ref 0–150)
VLDLC SERPL CALC-MCNC: 29 MG/DL

## 2019-10-01 PROCEDURE — 36415 COLL VENOUS BLD VENIPUNCTURE: CPT | Performed by: FAMILY MEDICINE

## 2019-11-30 DIAGNOSIS — G25.81 RESTLESS LEG SYNDROME: ICD-10-CM

## 2019-12-02 ENCOUNTER — TELEPHONE (OUTPATIENT)
Dept: FAMILY MEDICINE CLINIC | Age: 79
End: 2019-12-02

## 2019-12-02 RX ORDER — CLONAZEPAM 1 MG/1
TABLET ORAL
Qty: 30 TABLET | Refills: 0 | Status: SHIPPED | OUTPATIENT
Start: 2019-12-02 | End: 2020-07-23 | Stop reason: SDUPTHER

## 2019-12-03 RX ORDER — CLONAZEPAM 1 MG/1
TABLET ORAL
Qty: 30 TABLET | Refills: 0 | Status: SHIPPED | OUTPATIENT
Start: 2019-12-03 | End: 2020-01-29

## 2019-12-26 ENCOUNTER — OFFICE VISIT (OUTPATIENT)
Dept: FAMILY MEDICINE CLINIC | Age: 79
End: 2019-12-26
Payer: MEDICARE

## 2019-12-26 VITALS
HEART RATE: 74 BPM | DIASTOLIC BLOOD PRESSURE: 82 MMHG | TEMPERATURE: 98.6 F | SYSTOLIC BLOOD PRESSURE: 124 MMHG | BODY MASS INDEX: 26.36 KG/M2 | HEIGHT: 63 IN | RESPIRATION RATE: 16 BRPM | WEIGHT: 148.8 LBS

## 2019-12-26 DIAGNOSIS — R53.1 GENERAL WEAKNESS: ICD-10-CM

## 2019-12-26 DIAGNOSIS — M25.50 POLYARTHRALGIA: Primary | ICD-10-CM

## 2019-12-26 DIAGNOSIS — R53.83 FATIGUE, UNSPECIFIED TYPE: ICD-10-CM

## 2019-12-26 DIAGNOSIS — E78.2 MIXED HYPERLIPIDEMIA: ICD-10-CM

## 2019-12-26 LAB
A/G RATIO: 1.9 (ref 1.1–2.2)
ALBUMIN SERPL-MCNC: 4.4 G/DL (ref 3.4–5)
ALP BLD-CCNC: 57 U/L (ref 40–129)
ALT SERPL-CCNC: 16 U/L (ref 10–40)
ANION GAP SERPL CALCULATED.3IONS-SCNC: 16 MMOL/L (ref 3–16)
AST SERPL-CCNC: 29 U/L (ref 15–37)
BASOPHILS ABSOLUTE: 0 K/UL (ref 0–0.2)
BASOPHILS RELATIVE PERCENT: 0.5 %
BILIRUB SERPL-MCNC: 0.4 MG/DL (ref 0–1)
BUN BLDV-MCNC: 26 MG/DL (ref 7–20)
CALCIUM SERPL-MCNC: 9.7 MG/DL (ref 8.3–10.6)
CHLORIDE BLD-SCNC: 100 MMOL/L (ref 99–110)
CO2: 26 MMOL/L (ref 21–32)
CREAT SERPL-MCNC: 1.1 MG/DL (ref 0.6–1.2)
EOSINOPHILS ABSOLUTE: 0.2 K/UL (ref 0–0.6)
EOSINOPHILS RELATIVE PERCENT: 2 %
GFR AFRICAN AMERICAN: 58
GFR NON-AFRICAN AMERICAN: 48
GLOBULIN: 2.3 G/DL
GLUCOSE BLD-MCNC: 96 MG/DL (ref 70–99)
HCT VFR BLD CALC: 44.2 % (ref 36–48)
HEMOGLOBIN: 14.6 G/DL (ref 12–16)
LYMPHOCYTES ABSOLUTE: 3.2 K/UL (ref 1–5.1)
LYMPHOCYTES RELATIVE PERCENT: 37.9 %
MCH RBC QN AUTO: 30.1 PG (ref 26–34)
MCHC RBC AUTO-ENTMCNC: 33 G/DL (ref 31–36)
MCV RBC AUTO: 91.3 FL (ref 80–100)
MONOCYTES ABSOLUTE: 0.6 K/UL (ref 0–1.3)
MONOCYTES RELATIVE PERCENT: 7.5 %
NEUTROPHILS ABSOLUTE: 4.3 K/UL (ref 1.7–7.7)
NEUTROPHILS RELATIVE PERCENT: 52.1 %
PDW BLD-RTO: 14.4 % (ref 12.4–15.4)
PLATELET # BLD: 188 K/UL (ref 135–450)
PMV BLD AUTO: 8.8 FL (ref 5–10.5)
POTASSIUM SERPL-SCNC: 4.9 MMOL/L (ref 3.5–5.1)
RBC # BLD: 4.85 M/UL (ref 4–5.2)
RHEUMATOID FACTOR: <10 IU/ML
SEDIMENTATION RATE, ERYTHROCYTE: 9 MM/HR (ref 0–30)
SODIUM BLD-SCNC: 142 MMOL/L (ref 136–145)
TOTAL PROTEIN: 6.7 G/DL (ref 6.4–8.2)
TSH REFLEX: 1.42 UIU/ML (ref 0.27–4.2)
WBC # BLD: 8.4 K/UL (ref 4–11)

## 2019-12-26 PROCEDURE — 36415 COLL VENOUS BLD VENIPUNCTURE: CPT | Performed by: FAMILY MEDICINE

## 2019-12-26 PROCEDURE — 99214 OFFICE O/P EST MOD 30 MIN: CPT | Performed by: FAMILY MEDICINE

## 2019-12-26 PROCEDURE — G8482 FLU IMMUNIZE ORDER/ADMIN: HCPCS | Performed by: FAMILY MEDICINE

## 2019-12-26 PROCEDURE — G8417 CALC BMI ABV UP PARAM F/U: HCPCS | Performed by: FAMILY MEDICINE

## 2019-12-26 PROCEDURE — 4004F PT TOBACCO SCREEN RCVD TLK: CPT | Performed by: FAMILY MEDICINE

## 2019-12-26 PROCEDURE — 4040F PNEUMOC VAC/ADMIN/RCVD: CPT | Performed by: FAMILY MEDICINE

## 2019-12-26 PROCEDURE — G8399 PT W/DXA RESULTS DOCUMENT: HCPCS | Performed by: FAMILY MEDICINE

## 2019-12-26 PROCEDURE — G8427 DOCREV CUR MEDS BY ELIG CLIN: HCPCS | Performed by: FAMILY MEDICINE

## 2019-12-26 PROCEDURE — 1090F PRES/ABSN URINE INCON ASSESS: CPT | Performed by: FAMILY MEDICINE

## 2019-12-26 PROCEDURE — 1123F ACP DISCUSS/DSCN MKR DOCD: CPT | Performed by: FAMILY MEDICINE

## 2020-01-02 RX ORDER — CELECOXIB 200 MG/1
200 CAPSULE ORAL DAILY
Qty: 90 CAPSULE | Refills: 1 | Status: SHIPPED | OUTPATIENT
Start: 2020-01-02 | End: 2020-05-11

## 2020-01-21 ENCOUNTER — OFFICE VISIT (OUTPATIENT)
Dept: ORTHOPEDIC SURGERY | Age: 80
End: 2020-01-21
Payer: MEDICARE

## 2020-01-21 VITALS
DIASTOLIC BLOOD PRESSURE: 90 MMHG | TEMPERATURE: 97.6 F | HEIGHT: 63 IN | BODY MASS INDEX: 26.4 KG/M2 | WEIGHT: 149 LBS | HEART RATE: 55 BPM | SYSTOLIC BLOOD PRESSURE: 157 MMHG

## 2020-01-21 PROCEDURE — 99212 OFFICE O/P EST SF 10 MIN: CPT | Performed by: PHYSICIAN ASSISTANT

## 2020-01-21 PROCEDURE — 1090F PRES/ABSN URINE INCON ASSESS: CPT | Performed by: PHYSICIAN ASSISTANT

## 2020-01-21 PROCEDURE — 4004F PT TOBACCO SCREEN RCVD TLK: CPT | Performed by: PHYSICIAN ASSISTANT

## 2020-01-21 PROCEDURE — 1123F ACP DISCUSS/DSCN MKR DOCD: CPT | Performed by: PHYSICIAN ASSISTANT

## 2020-01-21 PROCEDURE — G8399 PT W/DXA RESULTS DOCUMENT: HCPCS | Performed by: PHYSICIAN ASSISTANT

## 2020-01-21 PROCEDURE — G8427 DOCREV CUR MEDS BY ELIG CLIN: HCPCS | Performed by: PHYSICIAN ASSISTANT

## 2020-01-21 PROCEDURE — 4040F PNEUMOC VAC/ADMIN/RCVD: CPT | Performed by: PHYSICIAN ASSISTANT

## 2020-01-21 PROCEDURE — G8417 CALC BMI ABV UP PARAM F/U: HCPCS | Performed by: PHYSICIAN ASSISTANT

## 2020-01-21 PROCEDURE — G8482 FLU IMMUNIZE ORDER/ADMIN: HCPCS | Performed by: PHYSICIAN ASSISTANT

## 2020-01-21 NOTE — PROGRESS NOTES
Subjective:      Patient ID: Ronit Shah is a [de-identified] y.o.  female. Chief Complaint   Patient presents with    Follow-up     History of total knee arthroplasty, right-2019        HPI:   She  for follow up visit. S/P  right knee arthroplasty. The date of procedure: 2019. Pain is 0-2/10. Right knee discomfort continues to improve. Review of Systems:  She denies fever or chills. She  denies any other complaints. Past Medical History:   Diagnosis Date    Arthritis     Diverticulitis     History of nonmelanoma skin cancer 8/3/2016    Hyperlipidemia     Osteoarthritis     Restless legs syndrome     Wrist fracture, left 2017    TRIPPED AT SON'S HOUSE       Family History   Problem Relation Age of Onset    Stroke Mother [de-identified]    Alzheimer's Disease Mother 68    Other Father 47        CHOKED AND FELL    Stroke Sister 80        BOTH SISTERS    Other Brother         BOTH  FROM ACCIDENTS       Past Surgical History:   Procedure Laterality Date    CHOLECYSTECTOMY      COLON SURGERY      DIVERTICULITIS, REMOVED DIVERTICULA POCKETS    SKIN CANCER EXCISION Left 2019    face    TOTAL KNEE ARTHROPLASTY Right 2019    RIGHT TOTAL KNEE REPLACEMENT performed by Paras Pinto MD at [address] [place] [address] History     Occupational History    Not on file   Tobacco Use    Smoking status: Light Tobacco Smoker     Packs/day: 0.25     Years: 50.00     Pack years: 12.50     Types: Cigarettes    Smokeless tobacco: Never Used    Tobacco comment: 5 cigarettes a day   Substance and Sexual Activity    Alcohol use: No    Drug use: No    Sexual activity: Not Currently     Partners: Male     Comment:        Current Outpatient Medications   Medication Sig Dispense Refill    celecoxib (CELEBREX) 200 MG capsule Take 1 capsule by mouth daily 90 capsule 1    pravastatin (PRAVACHOL) 40 MG tablet TAKE 1 TABLET EVERY NIGHT.  90 tablet 1    Multiple Vitamins-Minerals (CENTRUM

## 2020-01-29 RX ORDER — CLONAZEPAM 1 MG/1
TABLET ORAL
Qty: 30 TABLET | Refills: 0 | OUTPATIENT
Start: 2020-01-29 | End: 2020-02-29

## 2020-01-29 NOTE — TELEPHONE ENCOUNTER
Pt is calling to get a refill on her Clonazapam 1 mg #30  She takes a half to a whole one daily.     Ángel Warren

## 2020-05-04 RX ORDER — PRAVASTATIN SODIUM 40 MG
TABLET ORAL
Qty: 90 TABLET | Refills: 1 | Status: SHIPPED | OUTPATIENT
Start: 2020-05-04 | End: 2020-11-03 | Stop reason: SDUPTHER

## 2020-05-11 ENCOUNTER — OFFICE VISIT (OUTPATIENT)
Dept: ORTHOPEDIC SURGERY | Age: 80
End: 2020-05-11
Payer: MEDICARE

## 2020-05-11 VITALS — TEMPERATURE: 97 F

## 2020-05-11 PROCEDURE — G8417 CALC BMI ABV UP PARAM F/U: HCPCS | Performed by: PHYSICIAN ASSISTANT

## 2020-05-11 PROCEDURE — G8427 DOCREV CUR MEDS BY ELIG CLIN: HCPCS | Performed by: PHYSICIAN ASSISTANT

## 2020-05-11 PROCEDURE — 99212 OFFICE O/P EST SF 10 MIN: CPT | Performed by: PHYSICIAN ASSISTANT

## 2020-05-11 PROCEDURE — 4004F PT TOBACCO SCREEN RCVD TLK: CPT | Performed by: PHYSICIAN ASSISTANT

## 2020-05-11 PROCEDURE — 1090F PRES/ABSN URINE INCON ASSESS: CPT | Performed by: PHYSICIAN ASSISTANT

## 2020-05-11 PROCEDURE — 1123F ACP DISCUSS/DSCN MKR DOCD: CPT | Performed by: PHYSICIAN ASSISTANT

## 2020-05-11 PROCEDURE — G8399 PT W/DXA RESULTS DOCUMENT: HCPCS | Performed by: PHYSICIAN ASSISTANT

## 2020-05-11 PROCEDURE — 4040F PNEUMOC VAC/ADMIN/RCVD: CPT | Performed by: PHYSICIAN ASSISTANT

## 2020-05-11 RX ORDER — CELECOXIB 200 MG/1
CAPSULE ORAL
Qty: 90 CAPSULE | Refills: 1 | Status: SHIPPED | OUTPATIENT
Start: 2020-05-11 | End: 2020-11-03 | Stop reason: SDUPTHER

## 2020-05-11 NOTE — PROGRESS NOTES
Subjective:      Patient ID: Ashok Bhatia is a [de-identified] y.o. female. Chief Complaint   Patient presents with    Knee Problem     Right TKA 2019        HPI: She is here for annual follow up on right knee arthroplasty. The date of procedure(s) 2019. No new complaints or issues. There is minimal to no discomfort with ambulation. There is minimal to no discomfort at rest.   Steps can be performed in reciprocal fashion. Review of Systems:   A full list of the ROS have been reviewed. These are recorded and signed in the chart.     Past Medical History:   Diagnosis Date    Arthritis     Diverticulitis     History of nonmelanoma skin cancer 8/3/2016    Hyperlipidemia     Osteoarthritis     Restless legs syndrome     Wrist fracture, left 2017    TRIPPED AT SON'S HOUSE       Family History   Problem Relation Age of Onset    Stroke Mother [de-identified]    Alzheimer's Disease Mother 68    Other Father 47        CHOKED AND FELL    Stroke Sister 80        BOTH SISTERS    Other Brother         BOTH  FROM ACCIDENTS       Past Surgical History:   Procedure Laterality Date    CHOLECYSTECTOMY      COLON SURGERY      DIVERTICULITIS, REMOVED DIVERTICULA POCKETS    SKIN CANCER EXCISION Left 2019    face    TOTAL KNEE ARTHROPLASTY Right 2019    RIGHT TOTAL KNEE REPLACEMENT performed by Jessica Armstrong MD at [address] [place] [address] History     Occupational History    Not on file   Tobacco Use    Smoking status: Light Tobacco Smoker     Packs/day: 0.25     Years: 50.00     Pack years: 12.50     Types: Cigarettes    Smokeless tobacco: Never Used    Tobacco comment: 5 cigarettes a day   Substance and Sexual Activity    Alcohol use: No    Drug use: No    Sexual activity: Not Currently     Partners: Male     Comment:        Current Outpatient Medications   Medication Sig Dispense Refill    pravastatin (PRAVACHOL) 40 MG tablet TAKE 1 TABLET EVERY NIGHT 90 tablet 1    clonazePAM (KLONOPIN) 1 MG tablet TAKE ONE-HALF TO ONE TABLET BY MOUTH TAKE TABLET BY MOUTH ONCE NIGHTLY **MUST CALL MD FOR APPOINTMENT 30 tablet 1    celecoxib (CELEBREX) 200 MG capsule Take 1 capsule by mouth daily 90 capsule 1    clonazePAM (KLONOPIN) 1 MG tablet TAKE ONE-HALF TO ONE TABLET BY MOUTH TAKE TABLET BY MOUTH ONCE NIGHTLY 30 tablet 0    aspirin EC 81 MG EC tablet Take 1 tablet by mouth 2 times daily for 14 days Please avoid missing doses. 28 tablet 0    Multiple Vitamins-Minerals (CENTRUM SILVER PO) Take by mouth      vitamin B-12 (CYANOCOBALAMIN) 1000 MCG tablet Take 1,000 mcg by mouth daily      vitamin C (ASCORBIC ACID) 500 MG tablet Take 500 mg by mouth daily      vitamin D (CHOLECALCIFEROL) 1000 UNIT TABS tablet Take 1,000 Units by mouth daily       No current facility-administered medications for this visit. Objective:     She is alert, oriented x 3, pleasant, well nourished, developed and in no acute distress. Temp 97 °F (36.1 °C) (Temporal)      KNEE EXAM:  Examination of the right knee shows: There is  no obvious deformity. There is not erythema. There is minimal to no soft tissue swelling. There is no significant joint effusion. AROM-  Extension 0                     Flexion  130+  There is no pain associated with ROM testing. Medial joint line is not tender to palpation. Lateral joint line is not tender to palpation. There is not crepitus along the joint line with ROM testing. There is no significant instability with varus or valgus stress testing. Anterior Drawer test is negative for instability. Extensor Mechanism is  intact. NEUROLOGICAL EXAM:  Examination of the lower extremities are intact with sensation to light touch, motor testing 4+ to 5/5 in all major motor groups including hip abductors, hip adductors, Quadriceps, hamstring, dorsi flexors and EHL testing. Normal heel to toe gait.       VASCULAR EXAM:  Examination of the upper and lower extremities

## 2020-07-23 RX ORDER — CLONAZEPAM 1 MG/1
TABLET ORAL
Qty: 15 TABLET | Refills: 0 | Status: SHIPPED
Start: 2020-07-23 | End: 2020-08-03 | Stop reason: SDUPTHER

## 2020-07-23 RX ORDER — CLONAZEPAM 1 MG/1
TABLET ORAL
Qty: 30 TABLET | Refills: 0 | OUTPATIENT
Start: 2020-07-23

## 2020-07-23 NOTE — TELEPHONE ENCOUNTER
PT NEEDS AN APPT, I CALLED AND LEFT A VOICEMAIL THAT I HAVE HER SCHEDULED FOR 8/3 AT 1030 WITH DR. Jhon Mitchell. ASKED HER TO CALL BACK IF SHE IS ABLE TO MAKE THAT APPOINTMENT. Eb Singh

## 2020-08-03 ENCOUNTER — OFFICE VISIT (OUTPATIENT)
Dept: FAMILY MEDICINE CLINIC | Age: 80
End: 2020-08-03
Payer: MEDICARE

## 2020-08-03 VITALS
SYSTOLIC BLOOD PRESSURE: 126 MMHG | OXYGEN SATURATION: 98 % | TEMPERATURE: 97.5 F | HEIGHT: 63 IN | BODY MASS INDEX: 26.58 KG/M2 | DIASTOLIC BLOOD PRESSURE: 84 MMHG | HEART RATE: 56 BPM | RESPIRATION RATE: 18 BRPM | WEIGHT: 150 LBS

## 2020-08-03 LAB
AMPHETAMINE SCREEN, URINE: NEGATIVE
BARBITURATE SCREEN, URINE: NEGATIVE
BENZODIAZEPINE SCREEN, URINE: NEGATIVE
BUPRENORPHINE URINE: NEGATIVE
COCAINE METABOLITE SCREEN URINE: NEGATIVE
GABAPENTIN SCREEN, URINE: NEGATIVE
MDMA URINE: NEGATIVE
METHADONE SCREEN, URINE: NEGATIVE
METHAMPHETAMINE, URINE: NEGATIVE
OPIATE SCREEN URINE: NEGATIVE
OXYCODONE SCREEN URINE: NEGATIVE
PHENCYCLIDINE SCREEN URINE: NEGATIVE
PROPOXYPHENE SCREEN, URINE: NEGATIVE
THC SCREEN, URINE: NEGATIVE
TRICYCLIC ANTIDEPRESSANTS, UR: NEGATIVE

## 2020-08-03 PROCEDURE — 4040F PNEUMOC VAC/ADMIN/RCVD: CPT | Performed by: FAMILY MEDICINE

## 2020-08-03 PROCEDURE — 99214 OFFICE O/P EST MOD 30 MIN: CPT | Performed by: FAMILY MEDICINE

## 2020-08-03 PROCEDURE — 1123F ACP DISCUSS/DSCN MKR DOCD: CPT | Performed by: FAMILY MEDICINE

## 2020-08-03 PROCEDURE — 1090F PRES/ABSN URINE INCON ASSESS: CPT | Performed by: FAMILY MEDICINE

## 2020-08-03 PROCEDURE — 4004F PT TOBACCO SCREEN RCVD TLK: CPT | Performed by: FAMILY MEDICINE

## 2020-08-03 PROCEDURE — G8417 CALC BMI ABV UP PARAM F/U: HCPCS | Performed by: FAMILY MEDICINE

## 2020-08-03 PROCEDURE — G8399 PT W/DXA RESULTS DOCUMENT: HCPCS | Performed by: FAMILY MEDICINE

## 2020-08-03 PROCEDURE — G8427 DOCREV CUR MEDS BY ELIG CLIN: HCPCS | Performed by: FAMILY MEDICINE

## 2020-08-03 PROCEDURE — 80305 DRUG TEST PRSMV DIR OPT OBS: CPT | Performed by: FAMILY MEDICINE

## 2020-08-03 RX ORDER — CLONAZEPAM 1 MG/1
TABLET ORAL
Qty: 30 TABLET | Refills: 2 | Status: SHIPPED | OUTPATIENT
Start: 2020-08-03 | End: 2021-02-24 | Stop reason: SDUPTHER

## 2020-08-03 ASSESSMENT — PATIENT HEALTH QUESTIONNAIRE - PHQ9
1. LITTLE INTEREST OR PLEASURE IN DOING THINGS: 0
SUM OF ALL RESPONSES TO PHQ QUESTIONS 1-9: 0
SUM OF ALL RESPONSES TO PHQ QUESTIONS 1-9: 0
SUM OF ALL RESPONSES TO PHQ9 QUESTIONS 1 & 2: 0
2. FEELING DOWN, DEPRESSED OR HOPELESS: 0

## 2020-08-03 NOTE — PROGRESS NOTES
Subjective:      Patient ID: Abhinav Lloyd is a [de-identified] y.o. female. Chief Complaint   Patient presents with    Other     PT HERE FOR ROUTINE FOLLOW UP ON RESTLESS LEG SYNDROME AND NEEDS REFILLS ON MEDS. LAST DOSE OF KLONOPIN WAS 8/3/2020 [facility] AND UDS NEEDED   1115  HPI    Anxiety  No trouble sleeping due to anxiety. No prior tx for anxiety. Restless leg syndrome  Had restless leg for 30 yrs. No cramps had them years ago. Is on MVI with K.   PRA  Drinks 2 16 oz of diluted juice. Is on Celebrex for 3-4 years for hands. No gloves to bed. Long-term use of high-risk medication    Past Medical History:   Diagnosis Date    Arthritis     Diverticulitis     History of nonmelanoma skin cancer 8/3/2016    Hyperlipidemia     Osteoarthritis     Restless legs syndrome     Wrist fracture, left 06/2017    TRIPPED AT [facility]       Past Surgical History:   Procedure Laterality Date    CHOLECYSTECTOMY  1996    COLON SURGERY  2007    DIVERTICULITIS, REMOVED DIVERTICULA POCKETS    SKIN CANCER EXCISION Left 03/18/2019    face    TOTAL KNEE ARTHROPLASTY Right 4/1/2019    RIGHT TOTAL KNEE REPLACEMENT performed by Thien Matias MD at [address] [place] [address] History     Socioeconomic History    Marital status:      Spouse name: Not on file    Number of children: Not on file    Years of education: Not on file    Highest education level: Not on file   Occupational History    Occupation: Homemaker   Social Needs    Financial resource strain: Not on file    Food insecurity     Worry: Not on file     Inability: Not on file   [employer] needs     Medical: Not on file     Non-medical: Not on file   Tobacco Use    Smoking status: Light Tobacco Smoker     Packs/day: 0.25     Years: 50.00     Pack years: 12.50     Types: Cigarettes    Smokeless tobacco: Never Used    Tobacco comment: 5 cigarettes a day. Takes a puff here and there now quit 5 cig/ 2018.    Substance and Sexual Activity    Alcohol use: No    Drug use: No    Sexual activity: Not Currently     Partners: Male     Comment:    Lifestyle    Physical activity     Days per week: Not on file     Minutes per session: Not on file    Stress: Not on file   Relationships    Social connections     Talks on phone: Not on file     Gets together: Not on file     Attends Shinto service: Not on file     Active member of club or organization: Not on file     Attends meetings of clubs or organizations: Not on file     Relationship status: Not on file    Intimate partner violence     Fear of current or ex partner: Not on file     Emotionally abused: Not on file     Physically abused: Not on file     Forced sexual activity: Not on file   Other Topics Concern    Not on file   Social History Narrative    Exercise: Yard work with [employer]. 8/3/20. Family History   Problem Relation Age of Onset    Stroke Mother [de-identified]    Alzheimer's Disease Mother 68    Other Father 47        CHOKED AND FELL    Stroke Sister 80    Other Brother         BOTH  FROM ACCIDENTS    Stroke Sister 78       Allergies   Allergen Reactions    Naproxen Other (See Comments)     GI UPSET       Current Outpatient Medications   Medication Sig Dispense Refill    celecoxib (CELEBREX) 200 MG capsule TAKE 1 CAPSULE EVERY DAY 90 capsule 1    pravastatin (PRAVACHOL) 40 MG tablet TAKE 1 TABLET EVERY NIGHT 90 tablet 1    clonazePAM (KLONOPIN) 1 MG tablet TAKE ONE-HALF TO ONE TABLET BY MOUTH TAKE TABLET BY MOUTH ONCE NIGHTLY **MUST CALL MD FOR APPOINTMENT 30 tablet 1    aspirin EC 81 MG EC tablet Take 1 tablet by mouth 2 times daily for 14 days Please avoid missing doses.  28 tablet 0    Multiple Vitamins-Minerals (CENTRUM SILVER PO) Take by mouth      vitamin B-12 (CYANOCOBALAMIN) 1000 MCG tablet Take 1,000 mcg by mouth daily      vitamin C (ASCORBIC ACID) 500 MG tablet Take 500 mg by mouth daily      vitamin D (CHOLECALCIFEROL) 1000 UNIT TABS tablet Take 1,000 Units by mouth daily       No current facility-administered medications for this visit. Review of Systems   Constitutional: Negative for chills, diaphoresis and fatigue. Cardiovascular: Negative for chest pain, palpitations and leg swelling. Musculoskeletal: Negative for myalgias. Psychiatric/Behavioral: Positive for sleep disturbance. Negative for agitation, confusion, decreased concentration and dysphoric mood. The patient is not nervous/anxious and is not hyperactive. Objective:   Physical Exam  Vitals signs and nursing note reviewed. Constitutional:       General: She is not in acute distress. Appearance: Normal appearance. She is normal weight. She is not ill-appearing or diaphoretic. Cardiovascular:      Rate and Rhythm: Normal rate and regular rhythm. No extrasystoles are present. Pulses:           Dorsalis pedis pulses are 2+ on the right side and 2+ on the left side. Posterior tibial pulses are 2+ on the right side and 1+ on the left side. Heart sounds: Normal heart sounds. Heart sounds not distant. No murmur. No systolic murmur. No diastolic murmur. No friction rub. No gallop. No S3 or S4 sounds. Neurological:      Mental Status: She is alert. Psychiatric:         Attention and Perception: Attention and perception normal.         Mood and Affect: Mood and affect normal.         Speech: Speech normal.         Behavior: Behavior normal. Behavior is cooperative.          Cognition and Memory: Cognition and memory normal.         Judgment: Judgment normal.       Vitals:    08/03/20 1048   BP: 126/84   Site: Right Upper Arm   Position: Sitting   Cuff Size: Medium Adult   Pulse: 56   Resp: 18   Temp: 97.5 °F (36.4 °C)   TempSrc: Infrared   SpO2: 98%   Weight: 150 lb (68 kg)  Comment: SHOES ON   Height: 5' 3\" (1.6 m)     BP Readings from Last 3 Encounters:   08/03/20 126/84   01/21/20 (!) 157/90   12/26/19 124/82     Pulse Readings from Last 3 Encounters:   08/03/20 56   01/21/20 55   12/26/19 74 Wt Readings from Last 3 Encounters:   08/03/20 150 lb (68 kg)   01/21/20 149 lb (67.6 kg)   12/26/19 148 lb 12.8 oz (67.5 kg)     Body mass index is 26.57 kg/m². 12/26/2019 15:11   Sodium 142   Potassium 4.9   Chloride 100   CO2 26   BUN 26 (H)   Creatinine 1.1   Anion Gap 16   GFR Non-African American 48 (A)   Glucose 96   Calcium 9.7   Total Protein 6.7   Albumin 4.4   Globulin 2.3   Albumin/Globulin Ratio 1.9   Alk Phos 57   ALT 16   AST 29   Bilirubin 0.4   TSH 1.42   WBC 8.4   RBC 4.85   Hemoglobin Quant 14.6   Hematocrit 44.2   MCV 91.3   MCH 30.1   MCHC 33.0   MPV 8.8   RDW 14.4   Platelet Count 488   Neutrophils % 52.1   Lymphocyte % 37.9   Monocytes % 7.5   Eosinophils % 2.0   Basophils % 0.5   Neutrophils Absolute 4.3   Lymphocytes Absolute 3.2   Monocytes Absolute 0.6   Eosinophils Absolute 0.2   Basophils Absolute 0.0   Sed Rate 9   Rheumatoid Factor <10.0     Results for POC orders placed in visit on 08/03/20   POCT Rapid Drug Screen   Result Value Ref Range    Amphetamine Screen, Urine NEGATIVE     Barbiturate Screen, Urine NEGATIVE     Benzodiazepine Screen, Urine NEGATIVE     Buprenorphine Urine NEGATIVE     Cocaine Metabolite Screen, Urine NEGATIVE     Gabapentin Screen, Urine NEGATIVE     MDMA, Urine NEGATIVE     Methamphetamine, Urine NEGATIVE     Methadone Screen, Urine NEGATIVE     Opiate Scrn, Ur NEGATIVE     Oxycodone Screen, Ur NEGATIVE     PCP Screen, Urine NEGATIVE     Propoxyphene Screen, Urine NEGATIVE     THC Screen, Urine NEGATIVE     Tricyclic Antidepressants, Urine NEGATIVE      Assessment:      1. Anxiety    2. Restless leg syndrome    3. Primary osteoarthritis of both hands    4. Long-term use of high-risk medication    5. Educated about COVID-19 virus infection          Plan:    [address] [place] [address] was seen today for other.     Diagnoses and all orders for this visit:    Anxiety  -     POCT Rapid Drug Screen  -     clonazePAM (KLONOPIN) 1 MG tablet; TAKE ONE-HALF TO ONE TABLET BY MOUTH TAKE TABLET BY MOUTH ONCE NIGHTLY  -     Good control.  -     Continue meds and lifestyle control. Restless leg syndrome  -     clonazePAM (KLONOPIN) 1 MG tablet; TAKE ONE-HALF TO ONE TABLET BY MOUTH TAKE TABLET BY MOUTH ONCE NIGHTLY  -     Magnesium; Future. When blood drawn. Multiple vitamins often contain vitamin K which increases the risk of blood clots and iron which can increase blood pressures if it is not needed. Six vitamins without vitamin K are:  Centrum Silver Chewables (the non chewable version has vitamin K), Spectravite Senior Chewables ([facility] brand), and Simply Right Super Strength Multi Iron Free (requires 2 tabs per day but is NOT a chewable) ([employer] brand), Adonica Savant Made Adult Gummy Multiple Vitamin, Clotamin ([facility]  One A Day Proactive + for men and women (requires 2 tabs per day but is NOT a chewable). Read labels as changes may occur. Primary osteoarthritis of both hands  Wear gloves to bed. If having pain and stiffness in the am.  Use Aspercreme to hands and allow to dry before putting gloves on and up 3 more times per /day. Long-term use of high-risk medication  -     POCT Rapid Drug Screen  -     clonazePAM (KLONOPIN) 1 MG tablet; TAKE ONE-HALF TO ONE TABLET BY MOUTH TAKE TABLET BY MOUTH ONCE NIGHTLY    Educated about COVID-19 virus infection  The risk is likely to increase for COVID-19 infection as the reopening occurs. This is because while some people are being careful with social distancing masks and social isolation other people are totally ignoring it.      Preventing the Spread of Coronavirus Disease 2019 in Homes and Residential Communities   For the most recent information go to [employer]    Prevention steps for People with confirmed or suspected COVID-19 (including persons under investigation) who do not need to be hospitalized  and   People with confirmed COVID-19 who were hospitalized and determined to be medically stable to go home    Your healthcare provider and public health staff will evaluate whether you can be cared for at home. If it is determined that you do not need to be hospitalized and can be isolated at home, you will be monitored by staff from your local or state health department. You should follow the prevention steps below until a healthcare provider or local or state health department says you can return to your normal activities. Stay home except to get medical care  People who are mildly ill with COVID-19 are able to isolate at home during their illness. You should restrict activities outside your home, except for getting medical care. Do not go to work, school, or public areas. Avoid using public transportation, ride-sharing, or taxis. Separate yourself from other people and animals in your home  People: As much as possible, you should stay in a specific room and away from other people in your home. Also, you should use a separate bathroom, if available. Animals: You should restrict contact with pets and other animals while you are sick with COVID-19, just like you would around other people. Although there have not been reports of pets or other animals becoming sick with COVID-19, it is still recommended that people sick with COVID-19 limit contact with animals until more information is known about the virus. When possible, have another member of your household care for your animals while you are sick. If you are sick with COVID-19, avoid contact with your pet, including petting, snuggling, being kissed or licked, and sharing food. If you must care for your pet or be around animals while you are sick, wash your hands before and after you interact with pets and wear a facemask. Call ahead before visiting your doctor  If you have a medical appointment, call the healthcare provider and tell them that you have or may have COVID-19.  This will help the healthcare providers office take steps to keep other people from getting infected or exposed. Wear a facemask  You should wear a facemask when you are around other people (e.g., sharing a room or vehicle) or pets and before you enter a healthcare providers office. If you are not able to wear a facemask (for example, because it causes trouble breathing), then people who live with you should not stay in the same room with you, or they should wear a facemask if they enter your room. Cover your coughs and sneezes  Cover your mouth and nose with a tissue when you cough or sneeze. Throw used tissues in a lined trash can. Immediately wash your hands with soap and water for at least 20 seconds or, if soap and water are not available, clean your hands with an alcohol-based hand  that contains at least 60% alcohol. Clean your hands often  Wash your hands often with soap and water for at least 20 seconds, especially after blowing your nose, coughing, or sneezing; going to the bathroom; and before eating or preparing food. If soap and water are not readily available, use an alcohol-based hand  with at least 60% alcohol, covering all surfaces of your hands and rubbing them together until they feel dry. Soap and water are the best option if hands are visibly dirty. Avoid touching your eyes, nose, and mouth with unwashed hands. Avoid sharing personal household items  You should not share dishes, drinking glasses, cups, eating utensils, towels, or bedding with other people or pets in your home. After using these items, they should be washed thoroughly with soap and water. Clean all high-touch surfaces everyday  High touch surfaces include counters, tabletops, doorknobs, bathroom fixtures, toilets, phones, keyboards, tablets, and bedside tables. Also, clean any surfaces that may have blood, stool, or body fluids on them. Use a household cleaning spray or wipe, according to the label instructions.  Labels contain instructions for safe and effective use of the cleaning product including precautions you should take when applying the product, such as wearing gloves and making sure you have good ventilation during use of the product. Monitor your symptoms  Seek prompt medical attention if your illness is worsening (e.g., difficulty breathing). Before seeking care, call your healthcare provider and tell them that you have, or are being evaluated for, COVID-19. Put on a facemask before you enter the facility. These steps will help the healthcare providers office to keep other people in the office or waiting room from getting infected or exposed. Ask your healthcare provider to call the local or state health department. Persons who are placed under active monitoring or facilitated self-monitoring should follow instructions provided by their local health department or occupational health professionals, as appropriate. When working with your local health department check their available hours. If you have a medical emergency and need to call 911, notify the dispatch personnel that you have, or are being evaluated for COVID-19. If possible, put on a facemask before emergency medical services arrive. Discontinuing home isolation  Patients with confirmed COVID-19 should remain under home isolation precautions until the risk of secondary transmission to others is thought to be low. The decision to discontinue home isolation precautions should be made on a case-by-case basis, in consultation with healthcare providers and state and local health departments. Use Tylenol NOT Ibuprofen/Aleve/aspirin for pain or fevers. There is a theoretical decrease in the body's ability to fight the virus when you are infected if you are on NSAIDs. The FDA has said that this information is not yet proven. The newest evidence suggest that wearing a mask in public may be beneficial if you remember that the outside of it is contaminated and treat it accordingly.   It Half of fluids can be juice or sugar free Crystal Light. Don't count drinks with caffeine, alcohol or carbonation. Infants can have Pedialyte liquid or freezer pops. Avoid salt and sports drinks if you have high Blood Pressure, swelling in the feet or ankles or have heart problems. 2. Humidity: Humidify the air to 35-50% ( or until the windows fog over slightly). Can use a humidifier, vaporizer, boil water on the stove or put a coffee can full of water on the heater vents. This will loosen mucus from infections and allergies. 3. Sleep: Get 8-10 hours a night and rest during the evening after work or school. If you have trouble sleeping, adults can take Melatonin 5mg up to 2 tabs at bedtime ( not for children or pregnant women). If Mono is suspected then sleep during 9PM to 9AM time span (if possible.)  4. Cough: Take cough medicines with Guaifenesin ( to loosen chest or head congestion) and Dextromethorphan ( to decrease excess cough). Robitussin D.M. Syrup every 4-6 hrs OR Mucinex D. M. pills OR Delsym DM syrup twice a day. Use the pediatric formulations for children over 6 months making sure they are alcohol & sugar free for children, pregnant women, and diabetics. 5. Pain And Fevers: Take Acetaminophen ( Tylenol) for fevers, aches, and headaches. 2-500 mg every 8 hours for adults. Appropriate doses at bedtime for children may help them sleep better. If pregnant take 1 -500 mg (Tylenol) every 8 hours as needed. Ibuprofen/Aleve/aspirin for pain and fevers SHOULD NOT BE USED IN THE SETTING OF POSSIBLE COVID-19 viral infection NOR if pregnant, if you have acid reflux, high blood pressure, CHF, or kidney problems. 6.Gargle: (DAY ONE OF SYMPTOMS) Gargle in the back of the throat with the head tilted back and to the sides with a strong mouthwash  ( Listerine or Scope) after meals and at bedtime at least 4 -5 times a day.  This helps kill bacteria and viruses in the back of the throat and will shorten the duration and decrease the severity of your symptoms: sore throat, cough, ear popping,/ear pain, and possibly dizziness. 7. Smoking: Avoid smoking or exposure to second hand smoke. 8. Zinc: (DAY ONE OF SYMPTOMS)  Zinc lozenges such as Cold Praful (available most stores), or Basic ([employer] brand) will help shorten the duration and lessen symptoms such as sore throat, cough, nasal congestion, runny nose, and post nasal drip. Use 1 lozenge every 2-4 hours ( after meals if stomach is sensitive). Children can use 10-15 mg or less 3-4 times a day or Zinc lollypops. In pregnancy limit to 50-60 mg a day for 7 days as prenatals have Zinc also. With diarrhea use zinc pills 50 mg 1/2 to 1 pill 2x/day. 9. Vitamins: Vitamin C 500 mg with breakfast and dinner (with suspected or diagnosed COVID-19 infection take vitamin C 1000 mg 2-3 times a day). Children and pregnant women should drink citrus juices. This speeds healing and strengthens immune system. 10. Chest Symptoms: Vicks Vapor rub to the chest at bedtime. 11. Decongestants: Avoid all decongestants and antihistamine cold preparations in children. Decongestants should always be avoided in people with high blood pressure, palpitations, heart disease and those on stimulant medications. Antihistamines may impede the body's ability to fight COVID-19.  12. Frequent hand washing and/or hand gel especially after coughing or sneezing into the hands or blowing the nose to help prevent spreading to others. Use kleenex and NOT handkerchiefs. Try all of the above starting with day 1 of symptoms. If Strep throat symptoms appear call to be seen in the office as soon as possible and don't gargle on that day. Newborns, infants, or anyone with earaches or influenza may need to be seen quickly. Adults with fevers over 103 degrees or shortness of breath should call the office immediately.       Rani Bernal,

## 2020-08-03 NOTE — LETTER
MEDICATION AGREEMENT     Keely Paulino  [birth year]      For certain conditions, multiple classes of medications may be used to help better manage your symptoms, and to improve your ability to function at home, work and in social settings. However, these medications do have risks, which will be discussed with you, including addiction and dependency. The following prescribed medications need frequent monitoring and will require you to partner and assist in your healthcare. Medication  Dose, instructions and quantity as indicated on current prescription bottle Diagnosis/Reason(s) for Taking Category     KLONOPIN 0.5-1 TABLET NIGHTLY RESTLESS LEG SYNDROME                            Benefits and goals of Controlled Substance Medications: There are two potential goals for your treatment: (1) decreased pain and suffering (2) improved daily life functions. There are many possible treatments for your chronic condition(s), and, in addition to controlled substance medications, we will try alternatives such as physical therapy, yoga, massage, home daily exercise, meditation, relaxation techniques, injections, chiropractic manipulations, surgery, cognitive therapy, hypnosis and many medications that are not habit-forming. Use of controlled substance medications may be helpful, but they are unlikely to resolve all of your symptoms or restore all function. Risks of Controlled Substance Medications:    Opioid pain medications: These medications can lead to problems such as addiction/dependence, sedation, lightheadedness/dizziness, memory issues, falls, constipation, nausea, or vomiting. They may also impair the ability to drive or operate machinery. Additionally, these medications may lower testosterone levels, leading to loss of bone strength, stamina and sex drive.   They may cause problems with breathing, sleep apnea and reduced coughing, which are especially dangerous for patients with lung supplements and oral decongestants. Dependence withdrawal symptoms may include depressed mood, loss of interest, suicidal thoughts, anxiety, fatigue, appetite changes and agitation. Testosterone replacement therapy:  Potential side effects include increased risk of stroke and heart attack, blood clots, increased blood pressure, increased cholesterol, enlarged prostate, sleep apnea, irritability/aggression and other mood disorders, and decreased fertility. Other:     1. I understand that I have the following responsibilities:  · I will take medications at the dose and frequency prescribed. · I will not increase or change how I take my medications without the approval of the health care provider who signs this Medication Agreement. · I will arrange for refills at the prescribed interval ONLY during regular office hours. I will not ask for refills earlier than agreed, after-hours, on holidays or on weekends. · I will obtain all refills for these medications at  ·  ________[facility]  pharmacy (phone number  ·  ___[phone]_____________________), with full consent for my provider and pharmacist to exchange information in writing or verbally. · I will not request any pain medications or controlled substances from other providers and will inform this provider of all other medications I am taking. · I will inform my other health care providers that I am taking these medications and of the existence of this [address]. In the event of an emergency, I will provide the same information to the emergency department providers. · I will protect my prescriptions and medications. I understand that lost or misplaced prescriptions will not be replaced. · I will keep medications only for my own use and will not share them with others. I will keep all medications away from children.   · I agree to participate in any medical, psychological or psychiatric assessments recommended by my provider. · I will actively participate in any program designed to improve function, including social, physical, psychological and daily or work activities. 2. I will not use illegal or street drugs or another person's prescription. If I have an addiction problem with drugs or alcohol and my provider asks me to enter a program to address this issue, I agree to follow through. Such programs may include:  · 12-Step program and securing a sponsor  · Individual counseling   · Inpatient or outpatient treatment  · Other:_____________________________________________________________________________________________________________________________________________    If in treatment, I will request that a copy of the programs initial evaluation and treatment recommendations be sent to this provider and will not expect refills until that is received. I will also request written monthly updates be sent to this provider to verify my continuing treatment. 3. I will consent to drug screening upon my providers request to assure I am only taking the prescribed drugs, described in this MEDICATION AGREEMENT. I understand that a drug screen is a laboratory test in which a sample of my urine, blood or saliva is checked to see what drugs I have been taking. 4. I agree that I will treat the providers and staff at this office with respect at all times. I will keep all of my scheduled appointments, but if I need to cancel my appointment, I will do so a minimum of 24 hours before it is scheduled. 5. I understand that this provider may stop prescribing the medications listed if:  · I do not show any improvement in pain, or my activity has not improved. · I develop rapid tolerance or loss of improvement, as described in my treatment plan. · I develop significant side effects from the medication.   · My behavior is inconsistent with the responsibilities outlined above, which may also result in my being prevented from receiving further care from this office. · Other:____________________________________________________________________    AGREEMENT:    I have read the above and have had all of my questions answered. For chronic disease management, I know that my symptoms can be managed with many types of treatments. A chronic medication trial may be part of my treatment, but I must be an active participant in my care. Medication therapy is only one part of my symptom management plan. In some cases, there may be limited scientific evidence to support the chronic use of certain medications to improve symptoms and daily function. Furthermore, in certain circumstances, there may be scientific information that suggests that use of chronic controlled substances may actually worsen my symptoms and increase my risk of unintentional death directly related to this medication therapy. I know that if my provider feels my risk from controlled medications is greater than my benefit, I will have my controlled substance medication(s) compassionately lowered or removed altogether. I agree to a controlled substance medication trial.      I further agree to allow this office to contact my HIPAA contact on file if there are concerns about my safety and use of controlled medications. I have agreed to use the following medications above as instructed by my physician and as stated in this [address].      Patient Signature:  ______________________  [id number] or _____________    Provider Signature:______________________  [id number] or _____________

## 2020-08-03 NOTE — PATIENT INSTRUCTIONS
Merrill Lopez was seen today for other. Diagnoses and all orders for this visit:    Anxiety  -     POCT Rapid Drug Screen  -     clonazePAM (KLONOPIN) 1 MG tablet; TAKE ONE-HALF TO ONE TABLET BY MOUTH TAKE TABLET BY MOUTH ONCE NIGHTLY  -     Good control.  -     Continue meds and lifestyle control. Restless leg syndrome  -     clonazePAM (KLONOPIN) 1 MG tablet; TAKE ONE-HALF TO ONE TABLET BY MOUTH TAKE TABLET BY MOUTH ONCE NIGHTLY  -     Magnesium; Future. When blood drawn. Multiple vitamins often contain vitamin K which increases the risk of blood clots and iron which can increase blood pressures if it is not needed. Six vitamins without vitamin K are:  Centrum Silver Chewables (the non chewable version has vitamin K), Spectravite Senior Chewables ([employer] brand), and Simply Right Super Strength Multi Iron Free (requires 2 tabs per day but is NOT a chewable) ([employer] brand), Nadara Leys Made Adult Gummy Multiple Vitamin, Clotamin ([employer]  One A Day Proactive + for men and women (requires 2 tabs per day but is NOT a chewable). Read labels as changes may occur. Primary osteoarthritis of both hands  Wear gloves to bed. If having pain and stiffness in the am.  Use Aspercreme to hands and allow to dry before putting gloves on and up 3 more times per /day. Long-term use of high-risk medication  -     POCT Rapid Drug Screen  -     clonazePAM (KLONOPIN) 1 MG tablet; TAKE ONE-HALF TO ONE TABLET BY MOUTH TAKE TABLET BY MOUTH ONCE NIGHTLY    Educated about COVID-19 virus infection  The risk is likely to increase for COVID-19 infection as the reopening occurs. This is because while some people are being careful with social distancing masks and social isolation other people are totally ignoring it.      Preventing the Spread of Coronavirus Disease 2019 in Homes and Residential Communities   For the most recent information go to [employer]    Prevention steps for People with confirmed or suspected COVID-19 (including persons under investigation) who do not need to be hospitalized  and   People with confirmed COVID-19 who were hospitalized and determined to be medically stable to go home    Your healthcare provider and public health staff will evaluate whether you can be cared for at home. If it is determined that you do not need to be hospitalized and can be isolated at home, you will be monitored by staff from your local or state health department. You should follow the prevention steps below until a healthcare provider or local or state health department says you can return to your normal activities. Stay home except to get medical care  People who are mildly ill with COVID-19 are able to isolate at home during their illness. You should restrict activities outside your home, except for getting medical care. Do not go to work, school, or public areas. Avoid using public transportation, ride-sharing, or taxis. Separate yourself from other people and animals in your home  People: As much as possible, you should stay in a specific room and away from other people in your home. Also, you should use a separate bathroom, if available. Animals: You should restrict contact with pets and other animals while you are sick with COVID-19, just like you would around other people. Although there have not been reports of pets or other animals becoming sick with COVID-19, it is still recommended that people sick with COVID-19 limit contact with animals until more information is known about the virus. When possible, have another member of your household care for your animals while you are sick. If you are sick with COVID-19, avoid contact with your pet, including petting, snuggling, being kissed or licked, and sharing food. If you must care for your pet or be around animals while you are sick, wash your hands before and after you interact with pets and wear a facemask.   Call ahead before visiting your doctor  If you have a medical appointment, call the healthcare provider and tell them that you have or may have COVID-19. This will help the healthcare providers office take steps to keep other people from getting infected or exposed. Wear a facemask  You should wear a facemask when you are around other people (e.g., sharing a room or vehicle) or pets and before you enter a healthcare providers office. If you are not able to wear a facemask (for example, because it causes trouble breathing), then people who live with you should not stay in the same room with you, or they should wear a facemask if they enter your room. Cover your coughs and sneezes  Cover your mouth and nose with a tissue when you cough or sneeze. Throw used tissues in a lined trash can. Immediately wash your hands with soap and water for at least 20 seconds or, if soap and water are not available, clean your hands with an alcohol-based hand  that contains at least 60% alcohol. Clean your hands often  Wash your hands often with soap and water for at least 20 seconds, especially after blowing your nose, coughing, or sneezing; going to the bathroom; and before eating or preparing food. If soap and water are not readily available, use an alcohol-based hand  with at least 60% alcohol, covering all surfaces of your hands and rubbing them together until they feel dry. Soap and water are the best option if hands are visibly dirty. Avoid touching your eyes, nose, and mouth with unwashed hands. Avoid sharing personal household items  You should not share dishes, drinking glasses, cups, eating utensils, towels, or bedding with other people or pets in your home. After using these items, they should be washed thoroughly with soap and water.   Clean all high-touch surfaces everyday  High touch surfaces include counters, tabletops, doorknobs, bathroom fixtures, toilets, phones, keyboards, tablets, and bedside tables. Also, clean any surfaces that may have blood, stool, or body fluids on them. Use a household cleaning spray or wipe, according to the label instructions. Labels contain instructions for safe and effective use of the cleaning product including precautions you should take when applying the product, such as wearing gloves and making sure you have good ventilation during use of the product. Monitor your symptoms  Seek prompt medical attention if your illness is worsening (e.g., difficulty breathing). Before seeking care, call your healthcare provider and tell them that you have, or are being evaluated for, COVID-19. Put on a facemask before you enter the facility. These steps will help the healthcare providers office to keep other people in the office or waiting room from getting infected or exposed. Ask your healthcare provider to call the local or state health department. Persons who are placed under active monitoring or facilitated self-monitoring should follow instructions provided by their local health department or occupational health professionals, as appropriate. When working with your local health department check their available hours. If you have a medical emergency and need to call 911, notify the dispatch personnel that you have, or are being evaluated for COVID-19. If possible, put on a facemask before emergency medical services arrive. Discontinuing home isolation  Patients with confirmed COVID-19 should remain under home isolation precautions until the risk of secondary transmission to others is thought to be low. The decision to discontinue home isolation precautions should be made on a case-by-case basis, in consultation with healthcare providers and state and local health departments. Use Tylenol NOT Ibuprofen/Aleve/aspirin for pain or fevers. There is a theoretical decrease in the body's ability to fight the virus when you are infected if you are on NSAIDs.   The FDA has said that this information is not yet proven. The newest evidence suggest that wearing a mask in public may be beneficial if you remember that the outside of it is contaminated and treat it accordingly. It also helps prevent spread if someone has an asymptomatic or presymptomatic case and does not know it yet. Even cloth masks can be beneficial.    Advance Care Planning  People with COVID-19 may have no symptoms, mild symptoms, such as fever, cough, and shortness of breath or they may have more severe illness, developing severe and fatal pneumonia. As a result, Advance Care Planning with attention to naming a health care decision maker (someone you trust to make healthcare decisions for you if you could not speak for yourself) and sharing other health care preferences is important BEFORE a possible health crisis. Please contact your Primary Care Provider to discuss Advance Care Planning. Vitamin D by mouth and vitamin C intravenously are being used as part of the treatment regimen for COVID-19 in some hospitalized patients. Take  vitamin D 2000 IU daily. Also start vitamin C 500 mg daily. Both of these should be continued for the duration of the moderate risk portion of the COVID-19 pandemic expected to be 1 year. Continue wearing a mask when around people except those living in the same house who are not infected with or heavily exposed to COVID-19, handwashing/hand gel use, social distancing, and social isolation for nonessential activities. IF you develop ANY respiratory infection symptoms (not just allergy symptoms) suggestive of COVID-19 start the recommendations below: Instructions for Respiratory Infections (SAVE THIS SHEET)    For the first 7-14 days of symptoms follow instructions below, even before being seen in the office or even during treatment with antibiotics, until symptom free.     1. Water: Drink 1 ounce of water for every 2 pounds of body weight for adults, you need 64 Ounces of water/fluids per day. This will loosen mucus in the head and chest & improve the weak feeling of dehydration, allow the body to get germ fighting resources to the infection. Half of fluids can be juice or sugar free Crystal Light. Don't count drinks with caffeine, alcohol or carbonation. Infants can have Pedialyte liquid or freezer pops. Avoid salt and sports drinks if you have high Blood Pressure, swelling in the feet or ankles or have heart problems. 2. Humidity: Humidify the air to 35-50% ( or until the windows fog over slightly). Can use a humidifier, vaporizer, boil water on the stove or put a coffee can full of water on the heater vents. This will loosen mucus from infections and allergies. 3. Sleep: Get 8-10 hours a night and rest during the evening after work or school. If you have trouble sleeping, adults can take Melatonin 5mg up to 2 tabs at bedtime ( not for children or pregnant women). If Mono is suspected then sleep during 9PM to 9AM time span (if possible.)  4. Cough: Take cough medicines with Guaifenesin ( to loosen chest or head congestion) and Dextromethorphan ( to decrease excess cough). Robitussin D.M. Syrup every 4-6 hrs OR Mucinex D. M. pills OR Delsym DM syrup twice a day. Use the pediatric formulations for children over 6 months making sure they are alcohol & sugar free for children, pregnant women, and diabetics. 5. Pain And Fevers: Take Acetaminophen ( Tylenol) for fevers, aches, and headaches. 2-500 mg every 8 hours for adults. Appropriate doses at bedtime for children may help them sleep better. If pregnant take 1 -500 mg (Tylenol) every 8 hours as needed. Ibuprofen/Aleve/aspirin for pain and fevers SHOULD NOT BE USED IN THE SETTING OF POSSIBLE COVID-19 viral infection NOR if pregnant, if you have acid reflux, high blood pressure, CHF, or kidney problems.   6.Gargle: (DAY ONE OF SYMPTOMS) Gargle in the back of the throat with the head tilted back and to the sides with a strong mouthwash  ( Listerine or Scope) after meals and at bedtime at least 4 -5 times a day. This helps kill bacteria and viruses in the back of the throat and will shorten the duration and decrease the severity of your symptoms: sore throat, cough, ear popping,/ear pain, and possibly dizziness. 7. Smoking: Avoid smoking or exposure to second hand smoke. 8. Zinc: (DAY ONE OF SYMPTOMS)  Zinc lozenges such as Cold Praful (available most stores), or Basic ([employer] brand) will help shorten the duration and lessen symptoms such as sore throat, cough, nasal congestion, runny nose, and post nasal drip. Use 1 lozenge every 2-4 hours ( after meals if stomach is sensitive). Children can use 10-15 mg or less 3-4 times a day or Zinc lollypops. In pregnancy limit to 50-60 mg a day for 7 days as prenatals have Zinc also. With diarrhea use zinc pills 50 mg 1/2 to 1 pill 2x/day. 9. Vitamins: Vitamin C 500 mg with breakfast and dinner (with suspected or diagnosed COVID-19 infection take vitamin C 1000 mg 2-3 times a day). Children and pregnant women should drink citrus juices. This speeds healing and strengthens immune system. 10. Chest Symptoms: Vicks Vapor rub to the chest at bedtime. 11. Decongestants: Avoid all decongestants and antihistamine cold preparations in children. Decongestants should always be avoided in people with high blood pressure, palpitations, heart disease and those on stimulant medications. Antihistamines may impede the body's ability to fight COVID-19.  12. Frequent hand washing and/or hand gel especially after coughing or sneezing into the hands or blowing the nose to help prevent spreading to others. Use kleenex and NOT handkerchiefs. Try all of the above starting with day 1 of symptoms. If Strep throat symptoms appear call to be seen in the office as soon as possible and don't gargle on that day. Newborns, infants, or anyone with earaches or influenza may need to be seen quickly.  Adults with fevers over 103 degrees or shortness of breath should call the office immediately.

## 2020-11-03 ENCOUNTER — TELEPHONE (OUTPATIENT)
Dept: FAMILY MEDICINE CLINIC | Age: 80
End: 2020-11-03

## 2020-11-03 ENCOUNTER — OFFICE VISIT (OUTPATIENT)
Dept: FAMILY MEDICINE CLINIC | Age: 80
End: 2020-11-03
Payer: MEDICARE

## 2020-11-03 VITALS
TEMPERATURE: 97.4 F | WEIGHT: 149.6 LBS | BODY MASS INDEX: 26.51 KG/M2 | OXYGEN SATURATION: 98 % | SYSTOLIC BLOOD PRESSURE: 138 MMHG | HEART RATE: 68 BPM | HEIGHT: 63 IN | DIASTOLIC BLOOD PRESSURE: 80 MMHG

## 2020-11-03 LAB
A/G RATIO: 1.7 (ref 1.1–2.2)
ALBUMIN SERPL-MCNC: 4.3 G/DL (ref 3.4–5)
ALP BLD-CCNC: 67 U/L (ref 40–129)
ALT SERPL-CCNC: 15 U/L (ref 10–40)
ANION GAP SERPL CALCULATED.3IONS-SCNC: 8 MMOL/L (ref 3–16)
AST SERPL-CCNC: 24 U/L (ref 15–37)
BILIRUB SERPL-MCNC: 0.6 MG/DL (ref 0–1)
BUN BLDV-MCNC: 19 MG/DL (ref 7–20)
CALCIUM SERPL-MCNC: 9.4 MG/DL (ref 8.3–10.6)
CHLORIDE BLD-SCNC: 106 MMOL/L (ref 99–110)
CHOLESTEROL, TOTAL: 187 MG/DL (ref 0–199)
CO2: 29 MMOL/L (ref 21–32)
CREAT SERPL-MCNC: 1 MG/DL (ref 0.6–1.2)
GFR AFRICAN AMERICAN: >60
GFR NON-AFRICAN AMERICAN: 53
GLOBULIN: 2.5 G/DL
GLUCOSE BLD-MCNC: 110 MG/DL (ref 70–99)
HDLC SERPL-MCNC: 53 MG/DL (ref 40–60)
LDL CHOLESTEROL CALCULATED: 99 MG/DL
MAGNESIUM: 2.4 MG/DL (ref 1.8–2.4)
POTASSIUM SERPL-SCNC: 4.7 MMOL/L (ref 3.5–5.1)
SODIUM BLD-SCNC: 143 MMOL/L (ref 136–145)
TOTAL PROTEIN: 6.8 G/DL (ref 6.4–8.2)
TRIGL SERPL-MCNC: 176 MG/DL (ref 0–150)
VLDLC SERPL CALC-MCNC: 35 MG/DL

## 2020-11-03 PROCEDURE — 90653 IIV ADJUVANT VACCINE IM: CPT | Performed by: NURSE PRACTITIONER

## 2020-11-03 PROCEDURE — 99214 OFFICE O/P EST MOD 30 MIN: CPT | Performed by: NURSE PRACTITIONER

## 2020-11-03 PROCEDURE — G0008 ADMIN INFLUENZA VIRUS VAC: HCPCS | Performed by: NURSE PRACTITIONER

## 2020-11-03 PROCEDURE — 36415 COLL VENOUS BLD VENIPUNCTURE: CPT | Performed by: NURSE PRACTITIONER

## 2020-11-03 PROCEDURE — 69209 REMOVE IMPACTED EAR WAX UNI: CPT | Performed by: NURSE PRACTITIONER

## 2020-11-03 RX ORDER — CELECOXIB 200 MG/1
CAPSULE ORAL
Qty: 90 CAPSULE | Refills: 1 | Status: SHIPPED | OUTPATIENT
Start: 2020-11-03 | End: 2020-11-18

## 2020-11-03 RX ORDER — PRAVASTATIN SODIUM 40 MG
TABLET ORAL
Qty: 90 TABLET | Refills: 1 | Status: SHIPPED | OUTPATIENT
Start: 2020-11-03 | End: 2020-12-10

## 2020-11-03 ASSESSMENT — ENCOUNTER SYMPTOMS
DIARRHEA: 0
CONSTIPATION: 0
SHORTNESS OF BREATH: 0
CHEST TIGHTNESS: 0
COLOR CHANGE: 0
EYE DISCHARGE: 0
ABDOMINAL DISTENTION: 0
COUGH: 0
SINUS PRESSURE: 0
NAUSEA: 0
ABDOMINAL PAIN: 0
BACK PAIN: 0

## 2020-11-03 NOTE — PROGRESS NOTES
Vaccine Information Sheet, \"Influenza - Inactivated\"  given to Kirt Fierro, or parent/legal guardian of  [employer] and verbalized understanding. Patient responses:    Have you ever had a reaction to a flu vaccine? No  Do you have any current illness? No  Have you ever had Guillian Hudson Syndrome? No  Do you have a serious allergy to any of the follow: Neomycin, Polymyxin, Thimerosal, eggs or egg products? No    Flu vaccine given per order. Please see immunization tab. Risks and benefits explained. Current VIS given.       Immunizations Administered     Name Date Dose Route    Influenza, Triv, inactivated, subunit, adjuvanted, IM (Fluad 65 yrs and older) 11/3/2020 0.5 mL Intramuscular    Site: Deltoid- Left    Lot: [id number]    [address]: 88220-149-65

## 2020-11-03 NOTE — PROGRESS NOTES
Date of Service:  11/3/2020    Mayi Paulino (:  1940) is a 79 y.o. female, here for evaluation of the following medical concerns:    Chief Complaint   Patient presents with    Hyperlipidemia     ROUTINE FOLLOW UP FOR CHOL- PT IS FASTING    Other     ROUTINE FOLLOW UP FOR RLS    Arthritis     ROUTINE FOLLOW UP FOR OA         HPI     Anxiety/Restless Leg Syndrome/Osteoarthritis  Patient overall feeling pretty good. RLS well managed. OA well managed. Anxiety well managed with klonopin. Pt here today for follow up and blood work. No concerns to address. Medication working well. Anxiety: Patient complains of evaluation of anxiety disorder. She has the following anxiety symptoms: feelings of losing control. Onset of symptoms was approximately 20 years ago, stable since that time. She denies current suicidal and homicidal ideation. Family history significant for no psychiatric illness. Possible organic causes contributing are: none. Risk factors: none Previous treatment includes Clonazepam and none. She complains of the following side effects from the treatment: none. Review of Systems   Constitutional: Negative for activity change, appetite change and fatigue. HENT: Negative for congestion and sinus pressure. Eyes: Negative for discharge and visual disturbance. Respiratory: Negative for cough, chest tightness and shortness of breath. Cardiovascular: Negative for chest pain, palpitations and leg swelling. Gastrointestinal: Negative for abdominal distention, abdominal pain, constipation, diarrhea and nausea. Endocrine: Negative for cold intolerance, heat intolerance, polydipsia, polyphagia and polyuria. Genitourinary: Negative for decreased urine volume, difficulty urinating, dysuria, flank pain, frequency and urgency. Musculoskeletal: Negative for arthralgias, back pain, gait problem, joint swelling, myalgias and neck pain. Skin: Negative for color change and rash. Allergic/Immunologic: Negative for immunocompromised state. Neurological: Negative for dizziness, tremors, speech difficulty, weakness, light-headedness, numbness and headaches. Hematological: Negative for adenopathy. Does not bruise/bleed easily. Psychiatric/Behavioral: Negative for confusion, decreased concentration and sleep disturbance. The patient is not nervous/anxious. Prior to Visit Medications    Medication Sig Taking? Authorizing Provider   pravastatin (PRAVACHOL) 40 MG tablet TAKE 1 TABLET EVERY NIGHT Yes MICAELA Sood CNP   celecoxib (CELEBREX) 200 MG capsule TAKE 1 CAPSULE EVERY DAY Yes MICAELA Sood CNP   aspirin EC 81 MG EC tablet Take 1 tablet by mouth 2 times daily for 14 days Please avoid missing doses. Yes MajorMICAELA Gregorio CNP   Multiple Vitamins-Minerals (CENTRUM SILVER PO) Take by mouth Yes Historical Provider, MD   vitamin B-12 (CYANOCOBALAMIN) 1000 MCG tablet Take 1,000 mcg by mouth daily Yes Historical Provider, MD   vitamin C (ASCORBIC ACID) 500 MG tablet Take 500 mg by mouth daily Yes Historical Provider, MD   vitamin D (CHOLECALCIFEROL) 1000 UNIT TABS tablet Take 1,000 Units by mouth daily Yes Historical Provider, MD   clonazePAM (KLONOPIN) 1 MG tablet TAKE ONE-HALF TO ONE TABLET BY MOUTH TAKE TABLET BY MOUTH ONCE NIGHTLY  Patient not taking: Reported on 11/3/2020  Mellody Record, DO        Social History     Tobacco Use    Smoking status: Light Tobacco Smoker     Packs/day: 0.25     Years: 50.00     Pack years: 12.50     Types: Cigarettes    Smokeless tobacco: Never Used    Tobacco comment: 5 cigarettes a day. Takes a puff here and there now quit 5 cig/ 2018.    Substance Use Topics    Alcohol use: No        Vitals:    11/03/20 0943   BP: 138/80   Site: Right Upper Arm   Position: Sitting   Cuff Size: Medium Adult   Pulse: 68   Temp: 97.4 °F (36.3 °C)   TempSrc: Infrared   SpO2: 98%   Weight: 149 lb 9.6 oz (67.9 kg) Height: 5' 3\" (1.6 m)     Estimated body mass index is 26.5 kg/m² as calculated from the following:    Height as of this encounter: 5' 3\" (1.6 m). Weight as of this encounter: 149 lb 9.6 oz (67.9 kg). Physical Exam  Vitals signs reviewed. Constitutional:       General: She is awake. Appearance: Normal appearance. She is well-groomed and overweight. She is not ill-appearing. HENT:      Head: Normocephalic and atraumatic. Right Ear: Hearing, tympanic membrane, ear canal and external ear normal.      Left Ear: Hearing, tympanic membrane, ear canal and external ear normal.      Nose: Nose normal.      Mouth/Throat:      Mouth: Mucous membranes are moist.      Pharynx: Oropharynx is clear. Eyes:      General: Lids are normal.      Conjunctiva/sclera: Conjunctivae normal.      Pupils: Pupils are equal, round, and reactive to light. Neck:      Musculoskeletal: Normal range of motion and neck supple. Thyroid: No thyroid mass, thyromegaly or thyroid tenderness. Vascular: No carotid bruit. Cardiovascular:      Rate and Rhythm: Normal rate. Pulses:           Carotid pulses are 2+ on the right side and 2+ on the left side. Posterior tibial pulses are 1+ on the right side and 1+ on the left side. Heart sounds: Normal heart sounds, S1 normal and S2 normal. No murmur. Pulmonary:      Effort: Pulmonary effort is normal.      Breath sounds: Normal breath sounds. Abdominal:      General: Bowel sounds are normal. There is no abdominal bruit. Palpations: Abdomen is soft. Tenderness: There is no abdominal tenderness. Genitourinary:     Comments: Deferred  Musculoskeletal: Normal range of motion. Right lower leg: No edema. Left lower leg: No edema. Lymphadenopathy:      Head:      Right side of head: No submental, submandibular, tonsillar, preauricular, posterior auricular or occipital adenopathy.       Left side of head: No submental, submandibular, tonsillar, preauricular, posterior auricular or occipital adenopathy. Cervical: No cervical adenopathy. Right cervical: No superficial, deep or posterior cervical adenopathy. Left cervical: No superficial, deep or posterior cervical adenopathy. Upper Body:      Right upper body: No supraclavicular adenopathy. Left upper body: No supraclavicular adenopathy. Skin:     General: Skin is warm and dry. Capillary Refill: Capillary refill takes less than 2 seconds. Neurological:      General: No focal deficit present. Mental Status: She is alert and oriented to person, place, and time. Mental status is at baseline. Psychiatric:         Attention and Perception: Attention and perception normal.         Mood and Affect: Mood and affect normal.         Speech: Speech normal.         Behavior: Behavior normal. Behavior is cooperative. Thought Content: Thought content normal.         Cognition and Memory: Cognition and memory normal.         Judgment: Judgment normal.         ASSESSMENT/PLAN:  1. Anxiety  -Pt on Klonopin, not due for refill, last filled 10/20/20  -Urine drug screen up to date    2. Restless leg syndrome  - Magnesium    3. Mixed hyperlipidemia  - pravastatin (PRAVACHOL) 40 MG tablet; TAKE 1 TABLET EVERY NIGHT  Dispense: 90 tablet; Refill: 1  - Lipid Panel  -Work on limiting saturated fats in diet, and eating a healthy balance of fruits, vegetables, lean proteins, and multigrains.  -Physical activity 150 minutes weekly recommended   -Discussed Weight loss, initial goal 5% of body weight    4. Need for influenza vaccination  Information printed and reviewed  - INFLUENZA, TRIV, INACTIVATED, SUBUNIT, ADJUVANTED, 65 YRS AND OLDER, IM, PREFILL SYR, 0.5ML (FLUAD TRIV)    5. Primary osteoarthritis of right knee  - celecoxib (CELEBREX) 200 MG capsule; TAKE 1 CAPSULE EVERY DAY  Dispense: 90 capsule; Refill: 1    6.  Left ear impacted cerumen  Left ear canal with hard, impacted cerumen interfering with visualization of TM; cerumen removed using irrigation; cerumen successfully removed. Pt tolerated well. External canal slightly erythematous following procedure; pt reports can hear much better. Pearly gray TM WNL after procedure.  -Discussed debrox, over the counter ear wax softening drops    7. Hearing loss of left ear due to cerumen impaction  Improved with ear wax removal    8. Impaired fasting glucose  - Hemoglobin A1C    9. Renal insufficiency  - Comprehensive Metabolic Panel    AWV due before end of the year    Call insurance company to discuss coverage for shingles vaccine (Shingrix) 2 dose series     Reviewed urine drug screen  Results for Cookie Torre (MRN [id number]) as of 11/3/2020 14:52   Ref. Range 8/3/2020 11:31   Amphetamine Screen, Urine Unknown NEGATIVE   Barbiturate Screen, Urine Unknown NEGATIVE   Benzodiazepine Screen, Urine Unknown NEGATIVE   Buprenorphine Urine Unknown NEGATIVE   Methadone Screen, Urine Unknown NEGATIVE   Methamphetamine, Urine Unknown NEGATIVE   Opiate Scrn, Ur Unknown NEGATIVE   Oxycodone Screen, Ur Unknown NEGATIVE   PCP Screen, Urine Unknown NEGATIVE   Propoxyphene Screen, Urine Unknown NEGATIVE   Tricyclic Antidepressants, Urine Unknown NEGATIVE   Cocaine Metabolite Screen, Urine Unknown NEGATIVE   MDMA, Urine Unknown NEGATIVE   Gabapentin Screen, Urine Unknown NEGATIVE   THC Screen, Urine Unknown NEGATIVE     Reviewed right knee xray, no dictation- will call [facility] radiology to have dictation completed  EXAMINATION:  2 XRAY VIEWS OF THE RIGHT KNEE    4/1/2019 3:55 pm    COMPARISON:  None. HISTORY:  ORDERING SYSTEM PROVIDED HISTORY: post R TKA  TECHNOLOGIST PROVIDED HISTORY:  Of operative side while in recovery room. Reason for exam:->post R TKA  Ordering Physician Provided Reason for Exam: post R TKA  Acuity: Acute  Type of Exam: Initial    FINDINGS:  A cemented right knee arthroplasty has been placed.  There is no evidence of  fracture

## 2020-11-04 LAB
ESTIMATED AVERAGE GLUCOSE: 122.6 MG/DL
HBA1C MFR BLD: 5.9 %

## 2020-11-18 RX ORDER — CELECOXIB 200 MG/1
CAPSULE ORAL
Qty: 90 CAPSULE | Refills: 1 | Status: SHIPPED | OUTPATIENT
Start: 2020-11-18 | End: 2021-04-08

## 2020-12-10 RX ORDER — PRAVASTATIN SODIUM 40 MG
TABLET ORAL
Qty: 90 TABLET | Refills: 1 | Status: SHIPPED | OUTPATIENT
Start: 2020-12-10 | End: 2021-04-15

## 2021-01-22 ENCOUNTER — OFFICE VISIT (OUTPATIENT)
Dept: FAMILY MEDICINE CLINIC | Age: 81
End: 2021-01-22
Payer: MEDICARE

## 2021-01-22 VITALS
WEIGHT: 143.6 LBS | SYSTOLIC BLOOD PRESSURE: 116 MMHG | HEART RATE: 70 BPM | TEMPERATURE: 98.6 F | OXYGEN SATURATION: 97 % | HEIGHT: 63 IN | DIASTOLIC BLOOD PRESSURE: 78 MMHG | BODY MASS INDEX: 25.45 KG/M2

## 2021-01-22 DIAGNOSIS — L98.9 SKIN LESION OF BACK: Primary | ICD-10-CM

## 2021-01-22 PROCEDURE — G8427 DOCREV CUR MEDS BY ELIG CLIN: HCPCS | Performed by: NURSE PRACTITIONER

## 2021-01-22 PROCEDURE — 99213 OFFICE O/P EST LOW 20 MIN: CPT | Performed by: NURSE PRACTITIONER

## 2021-01-22 PROCEDURE — G8417 CALC BMI ABV UP PARAM F/U: HCPCS | Performed by: NURSE PRACTITIONER

## 2021-01-22 PROCEDURE — 4040F PNEUMOC VAC/ADMIN/RCVD: CPT | Performed by: NURSE PRACTITIONER

## 2021-01-22 PROCEDURE — 4004F PT TOBACCO SCREEN RCVD TLK: CPT | Performed by: NURSE PRACTITIONER

## 2021-01-22 PROCEDURE — 1090F PRES/ABSN URINE INCON ASSESS: CPT | Performed by: NURSE PRACTITIONER

## 2021-01-22 PROCEDURE — G8482 FLU IMMUNIZE ORDER/ADMIN: HCPCS | Performed by: NURSE PRACTITIONER

## 2021-01-22 PROCEDURE — 1123F ACP DISCUSS/DSCN MKR DOCD: CPT | Performed by: NURSE PRACTITIONER

## 2021-01-22 PROCEDURE — G8399 PT W/DXA RESULTS DOCUMENT: HCPCS | Performed by: NURSE PRACTITIONER

## 2021-01-22 ASSESSMENT — PATIENT HEALTH QUESTIONNAIRE - PHQ9
SUM OF ALL RESPONSES TO PHQ QUESTIONS 1-9: 0
SUM OF ALL RESPONSES TO PHQ QUESTIONS 1-9: 0

## 2021-01-22 NOTE — PATIENT INSTRUCTIONS
Contact your dermatologist for further evaluation of the spot on your back:  Naheed Ochoa MD    [address], [address] [facility]   [phone]      Vitamins that don't contain Vitamin K:  -Centrum Silver Chewables (the non chewable version has vitamin K)  -Spectravite Senior Chewables ([facility] brand)  -Simply Right Super Strength Multi Iron Free (requires 2 tabs per day but is NOT a chewable) ([employer] brand)  Weyman Hoops Made Adult Gummy Multiple Vitamin  -Clotamin ([facility]    -One A Day Proactive + for men and women (requires 2 tabs per day but is NOT a chewable)

## 2021-01-22 NOTE — PROGRESS NOTES
Keely Paulino (:  1940) is a 79 y.o. female,Established patient, here for evaluation of the following chief complaint(s): Mass (BUMP ON RIGHT SHOLDER OVER 6 MONTHS- STARTED AS A LITTLE PIMPLE BUT NOW BIGGER AND VERY RED AND ITCHY. ) and Other (DISCUSS WHAT VITAMIN SHE AN TAKE- WAS TOLD TO TAKE A VITMAIN WITH NO K )      ASSESSMENT/PLAN:  1. Skin lesion of back  -The lesion is irregular and raised. It is difficult to assess due to apparent itching of the patient to the lesion. Given her history of basal cell carcinoma, I have recommended that the patient goes to a dermatologist to have this evaluated. She was established with [facility] as recently as 2019. A new referral will be placed if necessary. She will follow-up if symptoms worsen and she is unable to get an appointment soon. Return if symptoms worsen or fail to improve. SUBJECTIVE/OBJECTIVE:  TEDDY  Tani Andrews presents today regarding a spot on her right upper back. She states that the spot has been present for approximately 6 months. She feels that it is increasing in size as it is becoming more itchy. She does have a history of basal cell carcinoma which had to be removed from her left upper extremity in 2019. She is inquiring whether she needs it further evaluated by dermatologist.    Review of Systems   Constitutional: Negative for chills and fever. Skin:        Spot to the right upper back which is red and itching       Physical Exam  Constitutional:       General: She is not in acute distress. Appearance: Normal appearance. She is normal weight. Skin:            Comments: Pink irregular shaped lesion to the right upper back as above. Raised with brown scabbing. Neurological:      Mental Status: She is alert.            On this date 21 I have spent 20 minutes reviewing previous notes, test results and face to face with the patient discussing the diagnosis and importance of compliance with the treatment plan as well as documenting on the day of the visit. An electronic signature was used to authenticate this note.     --MICAELA Pradhan - CNP

## 2021-02-17 DIAGNOSIS — G25.81 RESTLESS LEG SYNDROME: ICD-10-CM

## 2021-02-17 DIAGNOSIS — F41.9 ANXIETY: ICD-10-CM

## 2021-02-17 DIAGNOSIS — Z79.899 LONG-TERM USE OF HIGH-RISK MEDICATION: ICD-10-CM

## 2021-02-17 RX ORDER — CLONAZEPAM 1 MG/1
TABLET ORAL
Qty: 30 TABLET | Refills: 1 | OUTPATIENT
Start: 2021-02-17

## 2021-02-17 NOTE — TELEPHONE ENCOUNTER
Called patient no answer, no way to leave message. Pt due for an anxiety f/u and AWV  LAST VISIT 11/3/2020, NEXT VISIT none.

## 2021-02-19 NOTE — TELEPHONE ENCOUNTER
According to note for 11/3/2020 visit. The pt is no longer taking this.  Will schedule an appt if she calls back

## 2021-02-21 DIAGNOSIS — F41.9 ANXIETY: ICD-10-CM

## 2021-02-21 DIAGNOSIS — G25.81 RESTLESS LEG SYNDROME: ICD-10-CM

## 2021-02-21 DIAGNOSIS — Z79.899 LONG-TERM USE OF HIGH-RISK MEDICATION: ICD-10-CM

## 2021-02-22 RX ORDER — CLONAZEPAM 1 MG/1
TABLET ORAL
Qty: 30 TABLET | Refills: 1 | OUTPATIENT
Start: 2021-02-22

## 2021-02-24 DIAGNOSIS — G25.81 RESTLESS LEG SYNDROME: ICD-10-CM

## 2021-02-24 DIAGNOSIS — Z79.899 LONG-TERM USE OF HIGH-RISK MEDICATION: ICD-10-CM

## 2021-02-24 DIAGNOSIS — F41.9 ANXIETY: ICD-10-CM

## 2021-02-24 RX ORDER — CLONAZEPAM 1 MG/1
TABLET ORAL
Qty: 30 TABLET | Refills: 0 | Status: SHIPPED | OUTPATIENT
Start: 2021-02-24 | End: 2021-04-19

## 2021-02-24 NOTE — TELEPHONE ENCOUNTER
Patient has appointment scheduled on Monday 3/1 at 1 pm.   Says she needs the medication so she can sleep. Only has 2 left.  Per note will call in as long as appointment is scheduled

## 2021-02-25 DIAGNOSIS — Z79.899 LONG-TERM USE OF HIGH-RISK MEDICATION: ICD-10-CM

## 2021-02-25 DIAGNOSIS — F41.9 ANXIETY: ICD-10-CM

## 2021-02-25 DIAGNOSIS — G25.81 RESTLESS LEG SYNDROME: ICD-10-CM

## 2021-02-25 RX ORDER — CLONAZEPAM 1 MG/1
TABLET ORAL
Qty: 30 TABLET | Refills: 1 | OUTPATIENT
Start: 2021-02-25

## 2021-03-01 ENCOUNTER — OFFICE VISIT (OUTPATIENT)
Dept: FAMILY MEDICINE CLINIC | Age: 81
End: 2021-03-01
Payer: MEDICARE

## 2021-03-01 VITALS
DIASTOLIC BLOOD PRESSURE: 62 MMHG | OXYGEN SATURATION: 98 % | HEIGHT: 63 IN | BODY MASS INDEX: 25.34 KG/M2 | SYSTOLIC BLOOD PRESSURE: 130 MMHG | TEMPERATURE: 97.6 F | WEIGHT: 143 LBS | HEART RATE: 72 BPM

## 2021-03-01 DIAGNOSIS — F41.9 ANXIETY: ICD-10-CM

## 2021-03-01 DIAGNOSIS — G25.81 RESTLESS LEG SYNDROME: ICD-10-CM

## 2021-03-01 DIAGNOSIS — G47.00 INSOMNIA, UNSPECIFIED TYPE: Primary | ICD-10-CM

## 2021-03-01 DIAGNOSIS — R73.03 PREDIABETES: ICD-10-CM

## 2021-03-01 LAB — HBA1C MFR BLD: 5.5 %

## 2021-03-01 PROCEDURE — G8482 FLU IMMUNIZE ORDER/ADMIN: HCPCS | Performed by: NURSE PRACTITIONER

## 2021-03-01 PROCEDURE — 1036F TOBACCO NON-USER: CPT | Performed by: NURSE PRACTITIONER

## 2021-03-01 PROCEDURE — G8417 CALC BMI ABV UP PARAM F/U: HCPCS | Performed by: NURSE PRACTITIONER

## 2021-03-01 PROCEDURE — G8399 PT W/DXA RESULTS DOCUMENT: HCPCS | Performed by: NURSE PRACTITIONER

## 2021-03-01 PROCEDURE — 99213 OFFICE O/P EST LOW 20 MIN: CPT | Performed by: NURSE PRACTITIONER

## 2021-03-01 PROCEDURE — 83036 HEMOGLOBIN GLYCOSYLATED A1C: CPT | Performed by: NURSE PRACTITIONER

## 2021-03-01 PROCEDURE — 4040F PNEUMOC VAC/ADMIN/RCVD: CPT | Performed by: NURSE PRACTITIONER

## 2021-03-01 PROCEDURE — 1090F PRES/ABSN URINE INCON ASSESS: CPT | Performed by: NURSE PRACTITIONER

## 2021-03-01 PROCEDURE — G8427 DOCREV CUR MEDS BY ELIG CLIN: HCPCS | Performed by: NURSE PRACTITIONER

## 2021-03-01 PROCEDURE — 1123F ACP DISCUSS/DSCN MKR DOCD: CPT | Performed by: NURSE PRACTITIONER

## 2021-03-01 RX ORDER — CLONAZEPAM 1 MG/1
TABLET ORAL
Qty: 30 TABLET | Refills: 0 | Status: CANCELLED | OUTPATIENT
Start: 2021-03-01 | End: 2021-08-20

## 2021-03-01 ASSESSMENT — ENCOUNTER SYMPTOMS
DIARRHEA: 0
SHORTNESS OF BREATH: 0
ABDOMINAL DISTENTION: 0
ABDOMINAL PAIN: 0
EYE DISCHARGE: 0
CHEST TIGHTNESS: 0
COUGH: 0
CONSTIPATION: 0
SINUS PRESSURE: 0
COLOR CHANGE: 0
BACK PAIN: 0
SINUS PAIN: 0
NAUSEA: 0

## 2021-03-01 NOTE — PROGRESS NOTES
Date of Service:  3/1/2021    Lorena Paulino (:  1940) is a 79 y.o. female, here for evaluation of the following medical concerns:    Chief Complaint   Patient presents with    Insomnia     routine follow up insomina         TEDDY     Insomnia:  Current treatment: avoiding heavy meal before bedtime, avoiding long naps during the day, avoiding vigorous physical exercise prior to bed, limiting alcohol consumption and prescription sleep aid- Klonopin, which has been effective. Medication side effects: none. Review of Systems   Constitutional: Negative for activity change, appetite change, fatigue, fever and unexpected weight change. HENT: Negative for congestion, ear pain, sinus pressure and sinus pain. Eyes: Negative for discharge and visual disturbance. Respiratory: Negative for cough, chest tightness and shortness of breath. Cardiovascular: Negative for chest pain, palpitations and leg swelling. Gastrointestinal: Negative for abdominal distention, abdominal pain, constipation, diarrhea and nausea. Endocrine: Negative for cold intolerance, heat intolerance, polydipsia, polyphagia and polyuria. Genitourinary: Negative for decreased urine volume, difficulty urinating, dysuria, flank pain, frequency and urgency. Musculoskeletal: Negative for arthralgias, back pain, gait problem, joint swelling, myalgias and neck pain. Skin: Negative for color change, rash and wound. Allergic/Immunologic: Negative for food allergies and immunocompromised state. Neurological: Positive for numbness (left toes). Negative for dizziness, tremors, speech difficulty, weakness, light-headedness and headaches. Hematological: Negative for adenopathy. Does not bruise/bleed easily. Psychiatric/Behavioral: Negative for confusion, decreased concentration, self-injury, sleep disturbance and suicidal ideas. The patient is not nervous/anxious. Prior to Visit Medications    Medication Sig Taking? Right upper body: No supraclavicular adenopathy. Left upper body: No supraclavicular adenopathy. Skin:     General: Skin is warm and dry. Capillary Refill: Capillary refill takes less than 2 seconds. Neurological:      General: No focal deficit present. Mental Status: She is alert and oriented to person, place, and time. Mental status is at baseline. Sensory: Sensation is intact. Motor: Motor function is intact. Coordination: Coordination is intact. Gait: Gait is intact. Psychiatric:         Attention and Perception: Attention and perception normal.         Mood and Affect: Mood and affect normal.         Speech: Speech normal.         Behavior: Behavior normal. Behavior is cooperative. Thought Content: Thought content normal.         Cognition and Memory: Cognition and memory normal.         Judgment: Judgment normal.         ASSESSMENT/PLAN:  1. Insomnia, unspecified type   Continue clonazepam 0.5-1mg tablet nightly PRN for insomnia   Discussed healthy sleep habits   Recommend melatonin use, use clonazepam as back up when melatonin does not work    2. Restless leg syndrome   PRN clonazepam if RLS at night making sleep difficult   Physical activity 150 minutes weekly recommended     3. Anxiety   Pt takes clonazepam specifically for sleep, not directly related to anxiety    4. Prediabetes  -     POCT glycosylated hemoglobin (Hb A1C)    A1C 5.5 today, pt was prediabetic at visit around 3 months ago       [facility] vaccine- would like to go on list for [employer] company to discuss coverage for shingles vaccine (Shingrix) 2 dose series   AWV due this year      I have reviewed patient's pertinent medical history, relevant laboratory and imaging studies, and past/future health maintenance. Discussed with the patient the importance of adhering to their current medication regimen as directed.  Advised the patient that they should continue to work on eating a healthy balanced diet and staying active by exercising within their personal limits. Orders as listed above. Patient was advised to keep future appointments with their respective specialty care team(s). Patient had the opportunity to ask questions, all of which were answered to the best of my ability and with patient satisfaction. Patient understands and is agreeable with the care plan following today's visit. Patient is to schedule an appointment for any new or worsening symptoms. Go to ER for significant shortness of breath, chest pain, or uncontrolled pain or fever. I discussed with patient the risk and benefits of any medications that were prescribed today. I verified that the patient understands their medications, labs, and/or procedures. The patient is doing well with current medication regimen and does not have any barriers to adherence. The patient's self-management abilities are good. Return in about 3 months (around 6/1/2021) for Annual Wellness Visit- Insomnia Follow Up. An electronic signature was used to authenticate this note.     --MICAELA Kim - CNP on 3/2/2021 at 11:16 AM

## 2021-03-01 NOTE — PATIENT INSTRUCTIONS
Patient Education        Insomnia: Care Instructions  Your Care Instructions     Insomnia is the inability to sleep well. It is a common problem for most people at some time. Insomnia may make it hard for you to get to sleep, stay asleep, or sleep as long as you need to. This can make you tired and grouchy during the day. It can also make you forgetful, less effective at work, and unhappy. Insomnia can be caused by conditions such as depression or anxiety. Pain can also affect your ability to sleep. When these problems are solved, the insomnia usually clears up. But sometimes bad sleep habits can cause insomnia. If insomnia is affecting your work or your enjoyment of life, you can take steps to improve your sleep. Follow-up care is a key part of your treatment and safety. Be sure to make and go to all appointments, and call your doctor if you are having problems. It's also a good idea to know your test results and keep a list of the medicines you take. How can you care for yourself at home? What to avoid   · Do not have drinks with caffeine, such as coffee or black tea, for 8 hours before bed. · Do not smoke or use other types of tobacco near bedtime. Nicotine is a stimulant and can keep you awake. · Avoid drinking alcohol late in the evening, because it can cause you to wake in the middle of the night. · Do not eat a big meal close to bedtime. If you are hungry, eat a light snack. · Do not drink a lot of water close to bedtime, because the need to urinate may wake you up during the night. · Do not read or watch TV in bed. Use the bed only for sleeping and sexual activity. What to try   · Go to bed at the same time every night, and wake up at the same time every morning. Do not take naps during the day. · Keep your bedroom quiet, dark, and cool. · Sleep on a comfortable pillow and mattress. · If watching the clock makes you anxious, turn it facing away from you so you cannot see the time.   · If you insulin allows the sugar in your blood to get into your body's cells. But sometimes the body can't use insulin the right way. So the sugar stays in your blood instead. This is called insulin resistance. The buildup of sugar in your blood means you have prediabetes. The good news is that you may be able to prevent or delay diabetes. Making small lifestyle changes, like getting active and changing your eating habits, may help you get your blood sugar back to normal. You can work with your doctor to make a treatment plan. Follow-up care is a key part of your treatment and safety. Be sure to make and go to all appointments, and call your doctor if you are having problems. It's also a good idea to know your test results and keep a list of the medicines you take. How can you care for yourself at home? · Watch your weight. A healthy weight helps your body use insulin properly. · Limit the amount of calories, sweets, and unhealthy fat you eat. Ask your doctor if you should see a dietitian. A registered dietitian can help you create meal plans that fit your lifestyle. · Get at least 30 minutes of exercise on most days of the week. Exercise helps control your blood sugar. It also helps you maintain a healthy weight. Walking is a good choice. You also may want to do other activities, such as running, swimming, cycling, or playing tennis or team sports. · Do not smoke. Smoking can make prediabetes worse. If you need help quitting, talk to your doctor about stop-smoking programs and medicines. These can increase your chances of quitting for good. · If your doctor prescribed medicines, take them exactly as prescribed. Call your doctor if you think you are having a problem with your medicine. You will get more details on the specific medicines your doctor prescribes. When should you call for help? Watch closely for changes in your health, and be sure to contact your doctor if:    · You have any symptoms of diabetes. These may include:  ? Being thirsty more often. ? Urinating more. ? Being hungrier. ? Losing weight. ? Being very tired. ? Having blurry vision.     · You have a wound that will not heal.     · You have an infection that will not go away.     · You have problems with your blood pressure.     · You want more information about diabetes and how you can keep from getting it. Where can you learn more? Go to [email or web address] and sign in to your [employer] account. Enter I222 in the GenerationStation box to learn more about \"Prediabetes: Care Instructions. \"     If you do not have an account, please click on the \"Sign Up Now\" link. Current as of: December 20, 2019               Content Version: 12.6  © 0199-1764 [employer]. Care instructions adapted under license by [facility] ([facility]). If you have questions about a medical condition or this instruction, always ask your healthcare professional. [place] [address] any warranty or liability for your use of this information. Patient Education        Restless Legs Syndrome: Care Instructions  Your Care Instructions  Restless legs syndrome is a common nervous system problem. People with this syndrome feel a creeping, achy, or unpleasant feeling in the legs and an overpowering urge to move them. It often occurs in the evening and at night and can lead to sleep problems and tiredness. Your doctor may suggest doing a study of your sleep patterns to figure out what is happening when you try to sleep. Many people get relief from symptoms when they get regular exercise, eat well, and avoid caffeine, alcohol, and tobacco.  Follow-up care is a key part of your treatment and safety. Be sure to make and go to all appointments, and call your doctor if you are having problems. It's also a good idea to know your test results and keep a list of the medicines you take. How can you care for yourself at home?   · Take your medicines exactly as prescribed. Call your doctor if you think you are having a problem with your medicine. · Try bathing in hot or cold water. Applying a heating pad or ice bag to your legs may also help symptoms. · Stretch and massage your legs before bed or when discomfort begins. · Get some exercise for at least 30 minutes a day on most days of the week. Stop exercising at least 3 hours before bedtime. · Try to plan for situations where you will need to remain seated for long stretches. For example, if you are traveling by car, plan some stops so you can get out and walk around. · Tell your doctor about any medicines you are taking. This includes all over-the-counter, prescription, and herbal medicines. Some medicines, such as antidepressants, antihistamines, and cold and sinus medicines, can make your symptoms worse. · Avoid caffeine products, such as coffee, tea, cola, and chocolate. Caffeine can interrupt your sleep and stimulate you. · Do not smoke. Nicotine can make restless legs worse. If you need help quitting, talk to your doctor about stop-smoking programs and medicines. These can increase your chances of quitting for good. · Do not drink alcohol late in the evening. Take steps to help you sleep better  · Get plenty of sunlight in the outdoors, particularly later in the afternoon. · Use the evening hours for settling down. Avoid activities that challenge you in the hours before bedtime. · Eat meals at regular times, and do not snack before bedtime. · Keep your bedroom quiet, dark, and cool. Try using a sleep mask and earplugs to help you sleep. · Limit how much you drink at night to reduce your need to get up to urinate. But do not go to bed thirsty. · Run a fan or other steady \"white noise\" during the night if noises wake you up. · [place] the bed for sleeping and sex. Do your reading or TV watching in another room.   · Once you are in bed, relax from head to toe, and guide your mind to pleasant thoughts. · Do not stay in bed longer than 8 hours, and try to avoid naps. When should you call for help? Watch closely for changes in your health, and be sure to contact your doctor if:    · You are still not getting enough sleep.     · Your symptoms become more severe or happen more often. Where can you learn more? Go to https://chpejazzyewheraclio.Gymtrack. org and sign in to your [employer] account. Enter S060 in the Jangl SMS box to learn more about \"Restless Legs Syndrome: Care Instructions. \"     If you do not have an account, please click on the \"Sign Up Now\" link. Current as of: November 20, 2019               Content Version: 12.6  © 3390-0171 [employer]. Care instructions adapted under license by [facility] ([facility]). If you have questions about a medical condition or this instruction, always ask your healthcare professional. [address] any warranty or liability for your use of this information.

## 2021-03-01 NOTE — RESULT ENCOUNTER NOTE
The A1C, measure of diabetes, is 5.5 which is normal and not within the range of prediabetes or diabetes.

## 2021-03-25 ENCOUNTER — OFFICE VISIT (OUTPATIENT)
Dept: PRIMARY CARE CLINIC | Age: 81
End: 2021-03-25
Payer: MEDICARE

## 2021-03-25 DIAGNOSIS — Z20.828 EXPOSURE TO SARS-ASSOCIATED CORONAVIRUS: Primary | ICD-10-CM

## 2021-03-25 LAB — SARS-COV-2: NOT DETECTED

## 2021-03-25 PROCEDURE — G8417 CALC BMI ABV UP PARAM F/U: HCPCS | Performed by: NURSE PRACTITIONER

## 2021-03-25 PROCEDURE — 99211 OFF/OP EST MAY X REQ PHY/QHP: CPT | Performed by: NURSE PRACTITIONER

## 2021-03-25 PROCEDURE — G8428 CUR MEDS NOT DOCUMENT: HCPCS | Performed by: NURSE PRACTITIONER

## 2021-03-26 NOTE — PATIENT INSTRUCTIONS

## 2021-04-08 DIAGNOSIS — M17.11 PRIMARY OSTEOARTHRITIS OF RIGHT KNEE: ICD-10-CM

## 2021-04-08 RX ORDER — CELECOXIB 200 MG/1
CAPSULE ORAL
Qty: 90 CAPSULE | Refills: 0 | Status: SHIPPED | OUTPATIENT
Start: 2021-04-08 | End: 2021-06-21

## 2021-04-14 DIAGNOSIS — E78.2 MIXED HYPERLIPIDEMIA: ICD-10-CM

## 2021-04-15 RX ORDER — PRAVASTATIN SODIUM 40 MG
TABLET ORAL
Qty: 90 TABLET | Refills: 2 | Status: SHIPPED | OUTPATIENT
Start: 2021-04-15 | End: 2021-10-25 | Stop reason: ALTCHOICE

## 2021-04-19 DIAGNOSIS — F41.9 ANXIETY: ICD-10-CM

## 2021-04-19 DIAGNOSIS — Z79.899 LONG-TERM USE OF HIGH-RISK MEDICATION: ICD-10-CM

## 2021-04-19 DIAGNOSIS — G25.81 RESTLESS LEG SYNDROME: ICD-10-CM

## 2021-04-19 RX ORDER — CLONAZEPAM 1 MG/1
TABLET ORAL
Qty: 30 TABLET | Refills: 0 | Status: SHIPPED | OUTPATIENT
Start: 2021-04-19 | End: 2021-06-17

## 2021-04-26 ENCOUNTER — OFFICE VISIT (OUTPATIENT)
Dept: FAMILY MEDICINE CLINIC | Age: 81
End: 2021-04-26
Payer: MEDICARE

## 2021-04-26 VITALS
BODY MASS INDEX: 24.45 KG/M2 | OXYGEN SATURATION: 98 % | WEIGHT: 138 LBS | HEIGHT: 63 IN | SYSTOLIC BLOOD PRESSURE: 126 MMHG | HEART RATE: 60 BPM | TEMPERATURE: 98.1 F | DIASTOLIC BLOOD PRESSURE: 82 MMHG

## 2021-04-26 DIAGNOSIS — R73.01 IFG (IMPAIRED FASTING GLUCOSE): ICD-10-CM

## 2021-04-26 DIAGNOSIS — N30.01 ACUTE CYSTITIS WITH HEMATURIA: ICD-10-CM

## 2021-04-26 DIAGNOSIS — R39.9 UTI SYMPTOMS: ICD-10-CM

## 2021-04-26 DIAGNOSIS — R68.89 UNINTENTIONAL WEIGHT CHANGE: Primary | ICD-10-CM

## 2021-04-26 LAB
BILIRUBIN, POC: ABNORMAL
BLOOD URINE, POC: ABNORMAL
CHP ED QC CHECK: NORMAL
CLARITY, POC: CLEAR
COLOR, POC: ABNORMAL
GLUCOSE BLD-MCNC: 119 MG/DL
GLUCOSE URINE, POC: ABNORMAL
KETONES, POC: ABNORMAL
LEUKOCYTE EST, POC: ABNORMAL
NITRITE, POC: ABNORMAL
PH, POC: 5.5
PROTEIN, POC: ABNORMAL
SPECIFIC GRAVITY, POC: >1.03
UROBILINOGEN, POC: 0.2

## 2021-04-26 PROCEDURE — 4040F PNEUMOC VAC/ADMIN/RCVD: CPT | Performed by: NURSE PRACTITIONER

## 2021-04-26 PROCEDURE — 82962 GLUCOSE BLOOD TEST: CPT | Performed by: NURSE PRACTITIONER

## 2021-04-26 PROCEDURE — 81002 URINALYSIS NONAUTO W/O SCOPE: CPT | Performed by: NURSE PRACTITIONER

## 2021-04-26 PROCEDURE — 1090F PRES/ABSN URINE INCON ASSESS: CPT | Performed by: NURSE PRACTITIONER

## 2021-04-26 PROCEDURE — G8399 PT W/DXA RESULTS DOCUMENT: HCPCS | Performed by: NURSE PRACTITIONER

## 2021-04-26 PROCEDURE — G8427 DOCREV CUR MEDS BY ELIG CLIN: HCPCS | Performed by: NURSE PRACTITIONER

## 2021-04-26 PROCEDURE — 1036F TOBACCO NON-USER: CPT | Performed by: NURSE PRACTITIONER

## 2021-04-26 PROCEDURE — 1123F ACP DISCUSS/DSCN MKR DOCD: CPT | Performed by: NURSE PRACTITIONER

## 2021-04-26 PROCEDURE — 99213 OFFICE O/P EST LOW 20 MIN: CPT | Performed by: NURSE PRACTITIONER

## 2021-04-26 PROCEDURE — G8420 CALC BMI NORM PARAMETERS: HCPCS | Performed by: NURSE PRACTITIONER

## 2021-04-26 RX ORDER — SULFAMETHOXAZOLE AND TRIMETHOPRIM 800; 160 MG/1; MG/1
1 TABLET ORAL 2 TIMES DAILY
Qty: 6 TABLET | Refills: 0 | Status: SHIPPED | OUTPATIENT
Start: 2021-04-26 | End: 2021-04-27

## 2021-04-26 ASSESSMENT — ENCOUNTER SYMPTOMS
WHEEZING: 0
SHORTNESS OF BREATH: 0

## 2021-04-26 NOTE — PATIENT INSTRUCTIONS
Patient Education        sulfamethoxazole and trimethoprim (oral/injection)  Pronunciation:  SUL fa meth OX a zole and trye METH oh prim  Brand:  Bactrim, Bactrim DS, Bactrim I.V., Septra I.V., SMZ-TMP DS, Sulfatrim Pediatric  What is the most important information I should know about sulfamethoxazole and trimethoprim? You should not use this medicine if you have severe liver disease, kidney disease that is not being monitored, anemia caused by folic acid deficiency, if you take dofetilide, or if you have had low platelets caused by using trimethoprim or a sulfa drug. You should not take sulfamethoxazole and trimethoprim if you are pregnant or breastfeeding. What is sulfamethoxazole and trimethoprim? Sulfamethoxazole and trimethoprim is a combination antibiotic used to treat ear infections, urinary tract infections, bronchitis, traveler's diarrhea, shigellosis, and Pneumocystis jiroveci pneumonia. Sulfamethoxazole and trimethoprim may also be used for purposes not listed in this medication guide. What should I discuss with my healthcare provider before using sulfamethoxazole and trimethoprim? You should not use this medicine if you are allergic to sulfamethoxazole or trimethoprim, or if you have:  · severe liver disease;  · kidney disease that is not being treated or monitored;  · anemia (low red blood cells) caused by folic acid deficiency;  · a history of low blood platelets after taking trimethoprim or any sulfa drug; or  · if you take dofetilide (Tikosyn). Do not use sulfamethoxazole and trimethoprim if you are pregnant. This medicine could harm the unborn baby or cause birth defects. Use effective birth control, and tell your doctor if you become pregnant. Do not breastfeed while using this medicine. This medicine should not be given to a child younger than 3 months old.    Tell your doctor if you have ever had:  · kidney or liver disease;  · a folate (folic acid) deficiency;  · asthma or severe allergies;  · a thyroid disorder;  · HIV or AIDS;  · malnourishment;  · alcoholism;  · high levels of potassium in your blood;  · porphyria, or glucose-6-phosphate dehydrogenase (G6PD) deficiency; or  · if you use a blood thinner (such as warfarin) and you have routine \"INR\" or prothrombin time tests. How should I use sulfamethoxazole and trimethoprim? This medicine is taken by mouth (oral) or given as an infusion into a vein (injection). Follow all directions on your prescription label and read all medication guides or instruction sheets. Use the medicine exactly as directed. Shake the oral suspension (liquid) before you measure a dose. Use the dosing syringe provided, or use a medicine dose-measuring device (not a kitchen spoon). A healthcare provider will give the first injection and may teach you how to properly use the medication by yourself. When using injections by yourself, be sure you understand how to properly mix and store the medicine. Ask your doctor or pharmacist if you don't understand all instructions. Drink plenty of fluids to prevent kidney stones while you are using this medicine. Sulfamethoxazole and trimethoprim doses are based on weight in children. Use only the recommended dose when giving this medicine to a child. Use this medicine for the full prescribed length of time, even if your symptoms quickly improve. Skipping doses can increase your risk of infection that is resistant to medication. This medicine will not treat a viral infection such as the flu or a common cold. You may need frequent medical tests. This medicine can affect the results of certain medical tests. Tell any doctor who treats you that you are using sulfamethoxazole and trimethoprim. Store at room temperature away from moisture, heat, and light. What happens if I miss a dose? Use the medicine as soon as you can, but skip the missed dose if it is almost time for your next dose.  Do not use two doses at one time.  What happens if I overdose? Seek emergency medical attention or call the Poison Help line at 1-772.285.7914. Overdose symptoms may include loss of appetite, vomiting, fever, blood in your urine, yellowing of your skin or eyes, confusion, or loss of consciousness. What should I avoid while using sulfamethoxazole and trimethoprim? Antibiotic medicines can cause diarrhea, which may be a sign of a new infection. If you have diarrhea that is watery or bloody, call your doctor before using anti-diarrhea medicine. If you use the injection form of this medicine, do not eat or drink anything that contains propylene glycol (an ingredient in many processed foods, soft drinks, and medicines). Dangerous effects could occur. Sulfamethoxazole and trimethoprim could make you sunburn more easily. Avoid sunlight or tanning beds. Wear protective clothing and use sunscreen (SPF 30 or higher) when you are outdoors. What are the possible side effects of sulfamethoxazole and trimethoprim? Get emergency medical help if you have signs of an allergic reaction (hives, cough, shortness of breath, swelling in your face or throat) or a severe skin reaction (fever, sore throat, burning eyes, skin pain, red or purple skin rash with blistering and peeling). Seek medical treatment if you have a serious drug reaction that can affect many parts of your body. Symptoms may include: skin rash, fever, swollen glands, muscle aches, severe weakness, unusual bruising, or yellowing of your skin or eyes.   Call your doctor at once if you have:  · severe stomach pain, diarrhea that is watery or bloody (even if it occurs months after your last dose);  · a skin rash, no matter how mild;  · yellowing of your skin or eyes;  · a seizure;  · new or unusual joint pain;  · increased or decreased urination;  · swelling, bruising, or irritation around the IV needle;  · increased thirst, dry mouth, fruity breath odor;  · an electrolyte imbalance --headache, confusion, weakness, slurred speech, tingly feeling, chest pain, irregular heartbeats, loss of coordination or movement, feeling unsteady, vomiting; or  · low blood cell counts --fever, chills, mouth sores, skin sores, easy bruising, unusual bleeding, pale skin, cold hands and feet, feeling light-headed or short of breath. Common side effects may include:  · nausea, vomiting, loss of appetite; or  · skin rash. This is not a complete list of side effects and others may occur. Call your doctor for medical advice about side effects. You may report side effects to FDA at 9-145-JFQ-7290. What other drugs will affect sulfamethoxazole and trimethoprim? You may need more frequent check- ups or medical tests if you also use medicine to treat depression, diabetes, seizures, or HIV. Tell your doctor about all your current medicines. Many drugs can affect sulfamethoxazole and trimethoprim, especially:  · amantadine, cyclosporine, indomethacin, leucovorin, methotrexate, pyrimethamine;  · an \"ACE inhibitor\" heart or blood presure medication (benazepril, enalapril, lisinopril, quinapril, ramipril, and others); or  · a diuretic or \"water pill\" (chlorthalidone, hydrochlorothiazide, and others). This list is not complete and many other drugs may affect sulfamethoxazole and trimethoprim. This includes prescription and over-the-counter medicines, vitamins, and herbal products. Not all possible drug interactions are listed here. Where can I get more information? Your pharmacist can provide more information about sulfamethoxazole and trimethoprim. Remember, keep this and all other medicines out of the reach of children, never share your medicines with others, and use this medication only for the indication prescribed. Every effort has been made to ensure that the information provided by [address] is accurate, up-to-date, and complete, but no guarantee is made to that effect.  Drug information contained herein may be time sensitive. [employer] information has been compiled for use by healthcare practitioners and consumers in the [place] and therefore [employer] does not warrant that uses outside of the [place] are appropriate, unless specifically indicated otherwise. [facility] drug information does not endorse drugs, diagnose patients or recommend therapy. [employer] drug information is an informational resource designed to assist licensed healthcare practitioners in caring for their patients and/or to serve consumers viewing this service as a supplement to, and not a substitute for, the expertise, skill, knowledge and judgment of healthcare practitioners. The absence of a warning for a given drug or drug combination in no way should be construed to indicate that the drug or drug combination is safe, effective or appropriate for any given patient. [facility] does not assume any responsibility for any aspect of healthcare administered with the aid of information [facility] provides. The information contained herein is not intended to cover all possible uses, directions, precautions, warnings, drug interactions, allergic reactions, or adverse effects. If you have questions about the drugs you are taking, check with your doctor, nurse or pharmacist.  Copyright 4105-9120 [address]. Version: 10.01. Revision date: 9/23/2020. Care instructions adapted under license by [facility] ([facility]). If you have questions about a medical condition or this instruction, always ask your healthcare professional. [place] [address] any warranty or liability for your use of this information.

## 2021-04-26 NOTE — PROGRESS NOTES
Keely Paulino (:  1940) is a 79 y.o. female,Established patient, here for evaluation of the following chief complaint(s):  Weight Loss (WOULD LIKE TO DISCUSS UNEXPLAINED WEIGHT LOSS- STATES ABOUT 9 OR 9 POUNDS OVER LAST COUPLE MONTHS. WAS TOLD TO CUT BACK SUGAR. NO OTHER DIET CHANGES. )      ASSESSMENT/PLAN:  1. Unintentional weight change  -Weight loss is likely related to decrease in carbs in diet as well as increase activity. Educated the patient that this could be result representative of a benign finding. If the weight continues to decrease without trying, she will follow-up as needed. 2. UTI symptoms  -     POCT Urinalysis no Micro  -     Culture, Urine; Future  -     sulfamethoxazole-trimethoprim (BACTRIM DS;SEPTRA DS) 800-160 MG per tablet; Take 1 tablet by mouth 2 times daily for 3 days, Disp-6 tablet, R-0Normal  3. Acute cystitis with hematuria  -UA indicative of UTI. Bactrim is being sent to the pharmacy. The patient has been educated on medication use as well as side effects. Sending urine for culture. Follow-up as needed if no improvement. -     sulfamethoxazole-trimethoprim (BACTRIM DS;SEPTRA DS) 800-160 MG per tablet; Take 1 tablet by mouth 2 times daily for 3 days, Disp-6 tablet, R-0Normal  4. IFG (impaired fasting glucose)  - Blood sugar 119 2 hours after eating. Educated that this is an acceptable number.  -     POCT Glucose      Return if symptoms worsen or fail to improve. SUBJECTIVE/OBJECTIVE:  TEDDY Lawson presents today for evaluation of weight loss. She states that she has noticed over the past few months that she has lost approximately 9 pounds. She does state that she has been cutting back her sugar in her diet. She also feels like she has been more active with the warmer weather. She was unsure whether these could be causes of her weight loss. She did have a Mohs procedure done for basal cell to both her right upper back as well as her lip in the past few months.  She is also inquiring whether she could have her blood sugar checked today. She last ate around 11:30. Ruth Burkett is also inquiring about possible urinary tract infection. She states that she woke up this morning with frequency and some burning with urination. Denies any flank pain, abdominal pain, blood in urine, fever, chills, or other symptoms. Review of Systems   Constitutional: Positive for unexpected weight change. Negative for chills and fever. Respiratory: Negative for shortness of breath and wheezing. Cardiovascular: Negative for chest pain, palpitations and leg swelling. Genitourinary: Positive for dysuria, frequency and urgency. Negative for decreased urine volume, difficulty urinating, flank pain, hematuria and pelvic pain. Neurological: Negative for dizziness, weakness, light-headedness, numbness and headaches. Psychiatric/Behavioral: Negative for decreased concentration, dysphoric mood and sleep disturbance. The patient is not nervous/anxious. Physical Exam  Constitutional:       General: She is not in acute distress. Appearance: Normal appearance. She is normal weight. Cardiovascular:      Pulses: Normal pulses. Heart sounds: Normal heart sounds. No murmur. No gallop. Pulmonary:      Effort: Pulmonary effort is normal.      Breath sounds: Normal breath sounds. No wheezing. Neurological:      Mental Status: She is alert and oriented to person, place, and time. Psychiatric:         Mood and Affect: Mood normal.         Behavior: Behavior normal.         Thought Content: Thought content normal.         Judgment: Judgment normal.     Urine dipstick shows positive for leukocytes, red blood cells. On this date 4/26/2021 I have spent 20 minutes reviewing previous notes, test results and face to face with the patient discussing the diagnosis and importance of compliance with the treatment plan as well as documenting on the day of the visit.       An electronic signature was used to authenticate this note.     --Mey Lee, APRN - CNP

## 2021-04-27 ENCOUNTER — TELEPHONE (OUTPATIENT)
Dept: FAMILY MEDICINE CLINIC | Age: 81
End: 2021-04-27

## 2021-04-27 NOTE — TELEPHONE ENCOUNTER
Patient was prescribed Bactrim for a UTI yesterday. She took one pill and had diarrhea and abdominal pain all night. Does not want to take this again.

## 2021-04-27 NOTE — TELEPHONE ENCOUNTER
Hydrate well with water - will hold on further antibiotics until culture results back tomorrow unless symptoms are severe/ worsening. Let me know if this is case.

## 2021-04-28 DIAGNOSIS — N30.01 ACUTE CYSTITIS WITH HEMATURIA: Primary | ICD-10-CM

## 2021-04-28 LAB
ORGANISM: ABNORMAL
URINE CULTURE, ROUTINE: ABNORMAL

## 2021-04-28 RX ORDER — NITROFURANTOIN 25; 75 MG/1; MG/1
100 CAPSULE ORAL 2 TIMES DAILY
Qty: 20 CAPSULE | Refills: 0 | Status: SHIPPED | OUTPATIENT
Start: 2021-04-28 | End: 2021-07-30 | Stop reason: SDUPTHER

## 2021-06-08 ENCOUNTER — OFFICE VISIT (OUTPATIENT)
Dept: FAMILY MEDICINE CLINIC | Age: 81
End: 2021-06-08
Payer: MEDICARE

## 2021-06-08 VITALS
HEIGHT: 63 IN | SYSTOLIC BLOOD PRESSURE: 128 MMHG | BODY MASS INDEX: 23.74 KG/M2 | HEART RATE: 64 BPM | TEMPERATURE: 97.3 F | DIASTOLIC BLOOD PRESSURE: 82 MMHG | OXYGEN SATURATION: 100 % | WEIGHT: 134 LBS

## 2021-06-08 DIAGNOSIS — E78.2 MIXED HYPERLIPIDEMIA: ICD-10-CM

## 2021-06-08 DIAGNOSIS — Z00.00 MEDICARE ANNUAL WELLNESS VISIT, SUBSEQUENT: ICD-10-CM

## 2021-06-08 DIAGNOSIS — M15.9 PRIMARY OSTEOARTHRITIS INVOLVING MULTIPLE JOINTS: ICD-10-CM

## 2021-06-08 DIAGNOSIS — R73.9 HYPERGLYCEMIA: ICD-10-CM

## 2021-06-08 DIAGNOSIS — Z00.00 ROUTINE GENERAL MEDICAL EXAMINATION AT A HEALTH CARE FACILITY: Primary | ICD-10-CM

## 2021-06-08 DIAGNOSIS — M85.80 OSTEOPENIA, UNSPECIFIED LOCATION: ICD-10-CM

## 2021-06-08 DIAGNOSIS — F41.9 ANXIETY: ICD-10-CM

## 2021-06-08 DIAGNOSIS — R63.4 WEIGHT LOSS: ICD-10-CM

## 2021-06-08 DIAGNOSIS — N18.31 STAGE 3A CHRONIC KIDNEY DISEASE (HCC): ICD-10-CM

## 2021-06-08 LAB
A/G RATIO: 2 (ref 1.1–2.2)
ALBUMIN SERPL-MCNC: 4.3 G/DL (ref 3.4–5)
ALP BLD-CCNC: 61 U/L (ref 40–129)
ALT SERPL-CCNC: 15 U/L (ref 10–40)
ANION GAP SERPL CALCULATED.3IONS-SCNC: 8 MMOL/L (ref 3–16)
AST SERPL-CCNC: 29 U/L (ref 15–37)
BILIRUB SERPL-MCNC: 0.4 MG/DL (ref 0–1)
BUN BLDV-MCNC: 20 MG/DL (ref 7–20)
CALCIUM SERPL-MCNC: 9.3 MG/DL (ref 8.3–10.6)
CHLORIDE BLD-SCNC: 100 MMOL/L (ref 99–110)
CO2: 30 MMOL/L (ref 21–32)
CREAT SERPL-MCNC: 0.9 MG/DL (ref 0.6–1.2)
GFR AFRICAN AMERICAN: >60
GFR NON-AFRICAN AMERICAN: 60
GLOBULIN: 2.2 G/DL
GLUCOSE BLD-MCNC: 116 MG/DL (ref 70–99)
POTASSIUM SERPL-SCNC: 4.3 MMOL/L (ref 3.5–5.1)
SODIUM BLD-SCNC: 138 MMOL/L (ref 136–145)
TOTAL PROTEIN: 6.5 G/DL (ref 6.4–8.2)
TSH REFLEX: 0.8 UIU/ML (ref 0.27–4.2)
VITAMIN D 25-HYDROXY: 87.4 NG/ML

## 2021-06-08 PROCEDURE — 4040F PNEUMOC VAC/ADMIN/RCVD: CPT | Performed by: FAMILY MEDICINE

## 2021-06-08 PROCEDURE — 1123F ACP DISCUSS/DSCN MKR DOCD: CPT | Performed by: FAMILY MEDICINE

## 2021-06-08 PROCEDURE — 99213 OFFICE O/P EST LOW 20 MIN: CPT | Performed by: FAMILY MEDICINE

## 2021-06-08 PROCEDURE — G8399 PT W/DXA RESULTS DOCUMENT: HCPCS | Performed by: FAMILY MEDICINE

## 2021-06-08 PROCEDURE — 1090F PRES/ABSN URINE INCON ASSESS: CPT | Performed by: FAMILY MEDICINE

## 2021-06-08 PROCEDURE — 1036F TOBACCO NON-USER: CPT | Performed by: FAMILY MEDICINE

## 2021-06-08 PROCEDURE — 36415 COLL VENOUS BLD VENIPUNCTURE: CPT | Performed by: FAMILY MEDICINE

## 2021-06-08 PROCEDURE — G0439 PPPS, SUBSEQ VISIT: HCPCS | Performed by: FAMILY MEDICINE

## 2021-06-08 PROCEDURE — G8427 DOCREV CUR MEDS BY ELIG CLIN: HCPCS | Performed by: FAMILY MEDICINE

## 2021-06-08 PROCEDURE — G8420 CALC BMI NORM PARAMETERS: HCPCS | Performed by: FAMILY MEDICINE

## 2021-06-08 RX ORDER — LANCETS 30 GAUGE
1 EACH MISCELLANEOUS DAILY
Qty: 100 EACH | Refills: 3 | Status: SHIPPED | OUTPATIENT
Start: 2021-06-08

## 2021-06-08 RX ORDER — GLUCOSAMINE HCL/CHONDROITIN SU 500-400 MG
CAPSULE ORAL
Qty: 100 STRIP | Refills: 2 | Status: SHIPPED | OUTPATIENT
Start: 2021-06-08

## 2021-06-08 SDOH — ECONOMIC STABILITY: FOOD INSECURITY: WITHIN THE PAST 12 MONTHS, THE FOOD YOU BOUGHT JUST DIDN'T LAST AND YOU DIDN'T HAVE MONEY TO GET MORE.: NEVER TRUE

## 2021-06-08 SDOH — ECONOMIC STABILITY: FOOD INSECURITY: WITHIN THE PAST 12 MONTHS, YOU WORRIED THAT YOUR FOOD WOULD RUN OUT BEFORE YOU GOT MONEY TO BUY MORE.: NEVER TRUE

## 2021-06-08 ASSESSMENT — SOCIAL DETERMINANTS OF HEALTH (SDOH): HOW HARD IS IT FOR YOU TO PAY FOR THE VERY BASICS LIKE FOOD, HOUSING, MEDICAL CARE, AND HEATING?: NOT HARD AT ALL

## 2021-06-08 ASSESSMENT — PATIENT HEALTH QUESTIONNAIRE - PHQ9
SUM OF ALL RESPONSES TO PHQ QUESTIONS 1-9: 0
SUM OF ALL RESPONSES TO PHQ QUESTIONS 1-9: 0
1. LITTLE INTEREST OR PLEASURE IN DOING THINGS: 0
SUM OF ALL RESPONSES TO PHQ QUESTIONS 1-9: 0
SUM OF ALL RESPONSES TO PHQ9 QUESTIONS 1 & 2: 0
2. FEELING DOWN, DEPRESSED OR HOPELESS: 0

## 2021-06-08 ASSESSMENT — LIFESTYLE VARIABLES: HOW OFTEN DO YOU HAVE A DRINK CONTAINING ALCOHOL: 0

## 2021-06-08 NOTE — PROGRESS NOTES
Medicare Annual Wellness Visit  Name: Elaine Beck Date: 2021   MRN: [id number] Sex: Female   Age: 79 y.o. Ethnicity: Non-/Non    : 1940 Race: White      Keely Paulino is here for Aroldo CHO (MEDICARE ANNUAL WELLNESS VISIT ) and Insomnia (ROUTINE INSOMNIA FOLLOW UP  AND UDS UP TO DATE )  eating less sugar since sugar was high and working more outside - trimming etc.  Weight down 9# in last 3 months. Feels okay o/w  No heartburn/ nausea/ abd pain  No appetite lately. Using protein shakes/ drinks. Bowel movements okay. Sleeping well at night  Mood good  Memory good - on prevagen supplement  Anxiety okay    Screenings for behavioral, psychosocial and functional/safety risks, and cognitive dysfunction are all negative except as indicated below. These results, as well as other patient data from the [address] [facility], [place], operated by [facility] form, are documented in Flowsheets linked to this Encounter. Allergies   Allergen Reactions    Naproxen Other (See Comments)     GI UPSET    Bactrim [Sulfamethoxazole-Trimethoprim]      Nausea/ abd pain       Prior to Visit Medications    Medication Sig Taking? Authorizing Provider   clonazePAM (KLONOPIN) 1 MG tablet TAKE ONE HALF TO ONE TABLET BY MOUTH ONCE NIGHTLY Yes MICAELA Fine CNP   pravastatin (PRAVACHOL) 40 MG tablet TAKE 1 TABLET EVERY NIGHT Yes Tad Navarro MD   celecoxib (CELEBREX) 200 MG capsule TAKE 1 CAPSULE EVERY DAY Yes Tad Navarro MD   aspirin EC 81 MG EC tablet Take 1 tablet by mouth 2 times daily for 14 days Please avoid missing doses.  Yes MICAELA Montiel CNP   Multiple Vitamins-Minerals (CENTRUM SILVER PO) Take by mouth Yes Historical Provider, MD   vitamin B-12 (CYANOCOBALAMIN) 1000 MCG tablet Take 1,000 mcg by mouth daily Yes Historical Provider, MD   vitamin C (ASCORBIC ACID) 500 MG tablet Take 500 mg by mouth daily Yes Historical Provider, MD   vitamin D (CHOLECALCIFEROL) 1000 UNIT TABS tablet Take 1,000 Units by mouth daily Yes Historical Provider, MD       Past Medical History:   Diagnosis Date    Arthritis     Diverticulitis     History of nonmelanoma skin cancer 8/3/2016    Hyperlipidemia     Osteoarthritis     Restless legs syndrome     Wrist fracture, left 06/2017    TRIPPED AT [facility]       Past Surgical History:   Procedure Laterality Date    CHOLECYSTECTOMY  1996    COLON SURGERY  2007    DIVERTICULITIS, REMOVED DIVERTICULA POCKETS    SKIN CANCER EXCISION Left 03/18/2019    face    TOTAL KNEE ARTHROPLASTY Right 4/1/2019    RIGHT TOTAL KNEE REPLACEMENT performed by Tiffanie Douglass MD at [address] [place] [address] History   Problem Relation Age of Onset    Stroke Mother [de-identified]    Alzheimer's Disease Mother 68    Other Father 47        [address] [facility] Stroke Sister 80    Other Brother         [address] [facility] Stroke Sister 78       CareTeam (Including outside providers/suppliers regularly involved in providing care):   Patient Care Team:  Bharat Jennings MD as PCP - General (Family Medicine)  Bharat Jennings MD as PCP - [facility] Empaneled Provider    Wt Readings from Last 3 Encounters:   06/08/21 134 lb (60.8 kg)   04/26/21 138 lb (62.6 kg)   03/01/21 143 lb (64.9 kg)     Vitals:    06/08/21 1440   BP: 128/82   Site: Left Upper Arm   Position: Sitting   Cuff Size: Medium Adult   Pulse: 64   Temp: 97.3 °F (36.3 °C)   TempSrc: Temporal   SpO2: 100%   Weight: 134 lb (60.8 kg)   Height: 5' 3\" (1.6 m)     Body mass index is 23.74 kg/m². Based upon direct observation of the patient, evaluation of cognition reveals recent and remote memory intact. Patient's complete Health Risk Assessment and screening values have been reviewed and are found in Flowsheets. The following problems were reviewed today and where indicated follow up appointments were made and/or referrals ordered.     Positive Risk Factor Screenings with Interventions:          General Health and ACP:  General  In general, how would you say your health is?: Very Good  In the past 7 days, have you experienced any of the following?  New or Increased Pain, New or Increased Fatigue, Loneliness, Social Isolation, Stress or Anger?: None of These  Do you get the social and emotional support that you need?: Yes  Do you have a Living Will?: Yes  Advance Directives     Power of  Living Will ACP-Advance Directive ACP-Power of     Not on File Not on File Not on File Not on File      General Health Risk Interventions:  · lw/ dpoa addressed    Health Habits/Nutrition:  Health Habits/Nutrition  Do you exercise for at least 20 minutes 2-3 times per week?: Yes  Have you lost any weight without trying in the past 3 months?: (!) Yes  Do you eat only one meal per day?: No  Have you seen the dentist within the past year?: Yes  Body mass index: 23.73  Health Habits/Nutrition Interventions:  · diet/ activity dw/ pt    Hearing/Vision:  No exam data present  Hearing/Vision  Do you or your family notice any trouble with your hearing that hasn't been managed with hearing aids?: No  Do you have difficulty driving, watching TV, or doing any of your daily activities because of your eyesight?: No  Have you had an eye exam within the past year?: (!) No  Hearing/Vision Interventions:  · eye eval addressed      Personalized Preventive Plan   Current Health Maintenance Status  Immunization History   Administered Date(s) Administered    BREEZYID-19, Olson Peter, PF, 30mcg/0.3mL 03/25/2021, 04/15/2021    Influenza A ([id number]) Vaccine IM 12/23/2009    Influenza Vaccine, unspecified formulation 11/26/2002, 11/12/2003, 11/09/2004, 10/11/2005, 11/21/2006, 10/15/2007, 11/03/2008, 09/16/2010, 10/17/2011    Influenza, High Dose (Fluzone 65 yrs and older) 11/29/2012, 12/01/2014, 11/06/2015, 10/18/2016, 10/03/2017, 10/31/2018    Influenza, Quadv, IM, (6 mo and older Fluzone, Flulaval, Fluarix and 3 yrs and older Afluria) 10/15/2013    InfluenzaDanna, Recombinant, IM PF (Flublok 18 yrs and older) 10/09/2019    Influenza, Triv, inactivated, subunit, adjuvanted, IM (Fluad 65 yrs and older) 11/03/2020    Pneumococcal Conjugate 13-valent ([id number]) 07/26/2016    Pneumococcal Polysaccharide ([id number]) 11/12/2003, 05/18/2012    Td Vaccine >6yo [facility] 01/01/1997, 06/22/2009    Tdap (Boostrix, Adacel) 11/29/2012    Zoster Live (Zostavax) 03/21/2014        Health Maintenance   Topic Date Due    Shingles Vaccine (2 of 3) 05/16/2014    Annual Wellness Visit (AWV)  Never done    Lipid screen  11/03/2021    DTaP/Tdap/Td vaccine (2 - Td or Tdap) 11/29/2022    DEXA (modify frequency per FRAX score)  Completed    Flu vaccine  Completed    Pneumococcal 65+ years Vaccine  Completed    COVID-19 Vaccine  Completed    Hepatitis A vaccine  Aged Out    Hepatitis B vaccine  Aged Out    Hib vaccine  Aged Out    Meningococcal (ACWY) vaccine  Aged Out     Recommendations for We[employer].In Due: see orders and patient instructions/AVS.  . Recommended screening schedule for the next 5-10 years is provided to the patient in written form: see Patient Instructions/AVS.    There are no diagnoses linked to this encounter. Diagnosis Orders   1. Routine general medical examination at a health care facility     2. Medicare annual wellness visit, subsequent     3. Mixed hyperlipidemia     4. Anxiety     5. Osteopenia, unspecified location  Vitamin D 25 Hydroxy   6. Primary osteoarthritis involving multiple joints     7. Weight loss  TSH with Reflex   8. Stage 3a chronic kidney disease ([facility] [address]     9.  Hyperglycemia  Comprehensive Metabolic Panel     Arthritis  Stable - using topical aspercreme - helps katharina knuckles  High sugar dw/ pt - reassured patient mild  Hydrate well  Reviewed recent labs - on vit d   Check tsh, cmp w/ ckd/ weight loss  Increase high calorie protein dairy/ nut sources w/ milkshakes and monitor - eat more fruit  F/u if weight goes down more than a few pounds over next 1-2 months  ? Related to reduced sugar diet/ increased activity    dexa 2017 - osteopenia -consider repeat - holding for now per pt.   No recent mmg/ colon ca screen  Immunizations - utd on covid et al - ? shingrix

## 2021-06-08 NOTE — PATIENT INSTRUCTIONS
Personalized Preventive Plan for Paula Saldivar - 6/8/2021  Medicare offers a range of preventive health benefits. Some of the tests and screenings are paid in full while other may be subject to a deductible, co-insurance, and/or copay. Some of these benefits include a comprehensive review of your medical history including lifestyle, illnesses that may run in your family, and various assessments and screenings as appropriate. After reviewing your medical record and screening and assessments performed today your provider may have ordered immunizations, labs, imaging, and/or referrals for you. A list of these orders (if applicable) as well as your Preventive Care list are included within your After Visit Summary for your review. Other Preventive Recommendations:    · A preventive eye exam performed by an eye specialist is recommended every 1-2 years to screen for glaucoma; cataracts, macular degeneration, and other eye disorders. · A preventive dental visit is recommended every 6 months. · Try to get at least 150 minutes of exercise per week or 10,000 steps per day on a pedometer . · Order or download the FREE \"Exercise & Physical Activity: Your Everyday Guide\" from [employer]. Call 5-102.971.4111 or search [employer] online. · You need 9094-3888 mg of calcium and 4914-0091 IU of vitamin D per day. It is possible to meet your calcium requirement with diet alone, but a vitamin D supplement is usually necessary to meet this goal.  · When exposed to the sun, use a sunscreen that protects against both UVA and UVB radiation with an SPF of 30 or greater. Reapply every 2 to 3 hours or after sweating, drying off with a towel, or swimming. · Always wear a seat belt when traveling in a car. Always wear a helmet when riding a bicycle or motorcycle.

## 2021-06-17 DIAGNOSIS — F41.9 ANXIETY: ICD-10-CM

## 2021-06-17 DIAGNOSIS — Z79.899 LONG-TERM USE OF HIGH-RISK MEDICATION: ICD-10-CM

## 2021-06-17 DIAGNOSIS — G25.81 RESTLESS LEG SYNDROME: ICD-10-CM

## 2021-06-17 RX ORDER — CLONAZEPAM 1 MG/1
TABLET ORAL
Qty: 30 TABLET | Refills: 0 | Status: SHIPPED | OUTPATIENT
Start: 2021-06-17 | End: 2021-08-23 | Stop reason: SDUPTHER

## 2021-06-21 ENCOUNTER — TELEPHONE (OUTPATIENT)
Dept: FAMILY MEDICINE CLINIC | Age: 81
End: 2021-06-21

## 2021-06-21 DIAGNOSIS — M17.11 PRIMARY OSTEOARTHRITIS OF RIGHT KNEE: ICD-10-CM

## 2021-06-21 DIAGNOSIS — R73.03 PREDIABETES: Primary | ICD-10-CM

## 2021-06-21 RX ORDER — CELECOXIB 200 MG/1
CAPSULE ORAL
Qty: 90 CAPSULE | Refills: 0 | Status: SHIPPED | OUTPATIENT
Start: 2021-06-21 | End: 2021-09-29

## 2021-06-21 NOTE — TELEPHONE ENCOUNTER
OK PER DAVINA, FORGOT TO SEND ORDER FOR MONITOR. SENT TO PHARMACY FOR INSURANCE APPROVED METER.   [address]

## 2021-07-30 ENCOUNTER — OFFICE VISIT (OUTPATIENT)
Dept: FAMILY MEDICINE CLINIC | Age: 81
End: 2021-07-30
Payer: MEDICARE

## 2021-07-30 VITALS
WEIGHT: 134 LBS | BODY MASS INDEX: 23.74 KG/M2 | DIASTOLIC BLOOD PRESSURE: 66 MMHG | OXYGEN SATURATION: 96 % | SYSTOLIC BLOOD PRESSURE: 108 MMHG | HEART RATE: 70 BPM | HEIGHT: 63 IN

## 2021-07-30 DIAGNOSIS — N30.01 ACUTE CYSTITIS WITH HEMATURIA: Primary | ICD-10-CM

## 2021-07-30 DIAGNOSIS — Z72.0 TOBACCO ABUSE: ICD-10-CM

## 2021-07-30 DIAGNOSIS — R63.4 UNINTENTIONAL WEIGHT LOSS: ICD-10-CM

## 2021-07-30 LAB
BILIRUBIN, POC: ABNORMAL
BLOOD URINE, POC: ABNORMAL
CLARITY, POC: ABNORMAL
COLOR, POC: YELLOW
GLUCOSE URINE, POC: ABNORMAL
KETONES, POC: ABNORMAL
LEUKOCYTE EST, POC: ABNORMAL
NITRITE, POC: POSITIVE
PH, POC: 6.5
PROTEIN, POC: 100
SPECIFIC GRAVITY, POC: >=1.03
UROBILINOGEN, POC: 0.2

## 2021-07-30 PROCEDURE — 99214 OFFICE O/P EST MOD 30 MIN: CPT | Performed by: NURSE PRACTITIONER

## 2021-07-30 PROCEDURE — 4004F PT TOBACCO SCREEN RCVD TLK: CPT | Performed by: NURSE PRACTITIONER

## 2021-07-30 PROCEDURE — G8399 PT W/DXA RESULTS DOCUMENT: HCPCS | Performed by: NURSE PRACTITIONER

## 2021-07-30 PROCEDURE — G8427 DOCREV CUR MEDS BY ELIG CLIN: HCPCS | Performed by: NURSE PRACTITIONER

## 2021-07-30 PROCEDURE — G8420 CALC BMI NORM PARAMETERS: HCPCS | Performed by: NURSE PRACTITIONER

## 2021-07-30 PROCEDURE — 1090F PRES/ABSN URINE INCON ASSESS: CPT | Performed by: NURSE PRACTITIONER

## 2021-07-30 PROCEDURE — 4040F PNEUMOC VAC/ADMIN/RCVD: CPT | Performed by: NURSE PRACTITIONER

## 2021-07-30 PROCEDURE — 1123F ACP DISCUSS/DSCN MKR DOCD: CPT | Performed by: NURSE PRACTITIONER

## 2021-07-30 PROCEDURE — 81002 URINALYSIS NONAUTO W/O SCOPE: CPT | Performed by: NURSE PRACTITIONER

## 2021-07-30 RX ORDER — NITROFURANTOIN 25; 75 MG/1; MG/1
100 CAPSULE ORAL 2 TIMES DAILY
Qty: 14 CAPSULE | Refills: 0 | Status: SHIPPED | OUTPATIENT
Start: 2021-07-30 | End: 2021-08-06

## 2021-07-30 ASSESSMENT — ENCOUNTER SYMPTOMS
DIARRHEA: 0
COUGH: 0
NAUSEA: 0
SHORTNESS OF BREATH: 0
ABDOMINAL DISTENTION: 0
COLOR CHANGE: 0
SINUS PAIN: 0
CHEST TIGHTNESS: 0
SINUS PRESSURE: 0
EYE DISCHARGE: 0
CONSTIPATION: 0
ABDOMINAL PAIN: 0
BACK PAIN: 0

## 2021-07-30 NOTE — PATIENT INSTRUCTIONS
Call [address] to schedule CT chest for lung cancer screening    Come and drop off urine sample sometime August 9-13    Avoid/limit 4 Cs- carbonation, caffeine, citrus, and chocolate as these are bladder irritants    Urinate before and after sexual intercourse    Push fluids, water is best    Wipe front to back    Use mild unscented soap for demarco area    Avoid bubble baths, keep baths short    Minimize or avoid hot tub use    Take antibiotic in its entirety even if feeling better      Patient Education        Urinary Tract Infection (UTI) in Women: Care Instructions  Overview     A urinary tract infection, or UTI, is a general term for an infection anywhere between the kidneys and the urethra (where urine comes out). Most UTIs are bladder infections. They often cause pain or burning when you urinate. UTIs are caused by bacteria and can be cured with antibiotics. Be sure to complete your treatment so that the infection does not get worse. Follow-up care is a key part of your treatment and safety. Be sure to make and go to all appointments, and call your doctor if you are having problems. It's also a good idea to know your test results and keep a list of the medicines you take. How can you care for yourself at home? · Take your antibiotics as directed. Do not stop taking them just because you feel better. You need to take the full course of antibiotics. · Drink extra water and other fluids for the next day or two. This will help make the urine less concentrated and help wash out the bacteria that are causing the infection. (If you have kidney, heart, or liver disease and have to limit fluids, talk with your doctor before you increase the amount of fluids you drink.)  · Avoid drinks that are carbonated or have caffeine. They can irritate the bladder. · Urinate often. Try to empty your bladder each time. · To relieve pain, take a hot bath or lay a heating pad set on low over your lower belly or genital area.  Never go to sleep with a heating pad in place. To prevent UTIs  · Drink plenty of water each day. This helps you urinate often, which clears bacteria from your system. (If you have kidney, heart, or liver disease and have to limit fluids, talk with your doctor before you increase the amount of fluids you drink.)  · Urinate when you need to. · If you are sexually active, urinate right after you have sex. · Change sanitary pads often. · Avoid douches, bubble baths, feminine hygiene sprays, and other feminine hygiene products that have deodorants. · After going to the bathroom, wipe from front to back. When should you call for help? Call your doctor now or seek immediate medical care if:    · Symptoms such as fever, chills, nausea, or vomiting get worse or appear for the first time.     · You have new pain in your back just below your rib cage. This is called flank pain.     · There is new blood or pus in your urine.     · You have any problems with your antibiotic medicine. Watch closely for changes in your health, and be sure to contact your doctor if:    · You are not getting better after taking an antibiotic for 2 days.     · Your symptoms go away but then come back. Where can you learn more? Go to https://Cofio SoftwarepeMedStartr.Akvolution. org and sign in to your [employer] account. Enter D749 in the [facility] box to learn more about \"Urinary Tract Infection (UTI) in Women: Care Instructions. \"     If you do not have an account, please click on the \"Sign Up Now\" link. Current as of: February 10, 2021               Content Version: 12.9  © 6939-3394 Healthwise, Incorporated. Care instructions adapted under license by [facility] ([facility]). If you have questions about a medical condition or this instruction, always ask your healthcare professional. [place] [address] any warranty or liability for your use of this information.          Patient Education        Painful Urination (Dysuria): Care Instructions  Your Care Instructions  Burning pain with urination (dysuria) is a common symptom of a urinary tract infection or other urinary problems. The bladder may become inflamed. This can cause pain when the bladder fills and empties. You may also feel pain if the tube that carries urine from the bladder to the outside of the body (urethra) gets irritated or infected. Sexually transmitted infections (STIs) also may cause pain when you urinate. Sometimes the pain can be caused by things other than an infection. The urethra can be irritated by soaps, perfumes, or foreign objects in the urethra. Kidney stones can cause pain when they pass through the urethra. The cause may be hard to find. You may need tests. Treatment for painful urination depends on the cause. Follow-up care is a key part of your treatment and safety. Be sure to make and go to all appointments, and call your doctor if you are having problems. It's also a good idea to know your test results and keep a list of the medicines you take. How can you care for yourself at home? · Drink extra water for the next day or two. This will help make the urine less concentrated. (If you have kidney, heart, or liver disease and have to limit fluids, talk with your doctor before you increase the amount of fluids you drink.)  · Avoid drinks that are carbonated or have caffeine. They can irritate the bladder. · Urinate often. Try to empty your bladder each time. For women:  · Urinate right after you have sex. · After going to the bathroom, wipe from front to back. · Avoid douches, bubble baths, and feminine hygiene sprays. And avoid other feminine hygiene products that have deodorants. When should you call for help? Call your doctor now or seek immediate medical care if:    · You have new symptoms, such as fever, nausea, or vomiting.     · You have new or worse symptoms of a urinary problem. For example:  ? You have blood or pus in your urine. ?  You have chills or body aches. ? It hurts worse to urinate. ? You have groin or belly pain. ? You have pain in your back just below your rib cage (the flank area). Watch closely for changes in your health, and be sure to contact your doctor if you have any problems. Where can you learn more? Go to https://Microlight Sensorspepiceweb.Printed Piece. org and sign in to your [employer] account. Enter H057 in the [employer] box to learn more about \"Painful Urination (Dysuria): Care Instructions. \"     If you do not have an account, please click on the \"Sign Up Now\" link. Current as of: February 10, 2021               Content Version: 12.9  © 2006-2021 Healthwise, [employer]. Care instructions adapted under license by [facility] ([facility]). If you have questions about a medical condition or this instruction, always ask your healthcare professional. [place] [address] any warranty or liability for your use of this information. Patient Education        Abnormal Weight Loss: Care Instructions  Your Care Instructions     There are two types of weight loss--normal and abnormal. The normal kind happens when you are trying to lose weight by exercising more or eating less. The abnormal kind happens when you are not trying to lose weight. Many medical problems can cause abnormal weight loss. These include problems with your thyroid gland, long-term infections, mouth or throat problems that make it hard to eat, and digestive problems. They also include depression and cancer. Some medicines also may cause you to lose weight. You can work with your doctor to find the cause of your weight loss. You will probably need tests to do this. Follow-up care is a key part of your treatment and safety. Be sure to make and go to all appointments, and call your doctor if you are having problems. It's also a good idea to know your test results and keep a list of the medicines you take.   How can you care for yourself at

## 2021-07-30 NOTE — PROGRESS NOTES
Date of Service:  2021    Isabelle Paulino (:  1940) is a 79 y.o. female, here for evaluation of the following medical concerns:    Chief Complaint   Patient presents with    Urinary Tract Infection     pt is c/o of poss uti that started this morning, is having pain with urination         HPI     Urinary Tract Infection  Patient complains of dysuria, frequency, urgency, nocturia She has had symptoms for 1 day. Patient also complains of NONE. Patient denies back pain, congestion, cough, fever, headache, rhinitis, sorethroat, stomach ache and vaginal discharge. Patient does have a history of recurrent UTI. Patient does not have a history of pyelonephritis. Reviewed past 2021 UA and cx. Pt did not tolerate bactrim. Review of Systems   Constitutional: Positive for appetite change (decreased) and unexpected weight change. Negative for activity change, fatigue and fever. HENT: Negative for congestion, ear pain, sinus pressure and sinus pain. Eyes: Negative for discharge and visual disturbance. Respiratory: Negative for cough, chest tightness and shortness of breath. Cardiovascular: Negative for chest pain, palpitations and leg swelling. Gastrointestinal: Negative for abdominal distention, abdominal pain, constipation, diarrhea and nausea. Endocrine: Negative for cold intolerance, heat intolerance, polydipsia, polyphagia and polyuria. Genitourinary: Positive for decreased urine volume, dysuria, frequency and urgency. Negative for difficulty urinating, flank pain, vaginal bleeding, vaginal discharge and vaginal pain. Musculoskeletal: Negative for arthralgias, back pain, gait problem, joint swelling, myalgias and neck pain. Skin: Negative for color change, rash and wound. Allergic/Immunologic: Negative for food allergies and immunocompromised state. Neurological: Negative for dizziness, tremors, speech difficulty, weakness, light-headedness, numbness and headaches. Hematological: Negative for adenopathy. Does not bruise/bleed easily. Psychiatric/Behavioral: Negative for confusion, decreased concentration, self-injury, sleep disturbance and suicidal ideas. The patient is not nervous/anxious. Prior to Visit Medications    Medication Sig Taking? Authorizing Provider   nitrofurantoin, macrocrystal-monohydrate, (MACROBID) 100 MG capsule Take 1 capsule by mouth 2 times daily for 7 days Yes MICAELA Qiu CNP   celecoxib (CELEBREX) 200 MG capsule TAKE 1 CAPSULE EVERY DAY Yes MICAELA Munoz CNP   Blood Glucose Monitoring Suppl KIT 1 each by Does not apply route daily Indications: Impaired Glucose Tolerance Yes Luis Enrique Alexandre MD   clonazePAM (KLONOPIN) 1 MG tablet TAKE ONE HALF TO ONE TABLET BY MOUTH NIGHTLY Yes MICAELA Witt CNP   Lancets MISC 1 each by Does not apply route daily Dispense whatever brand insurance prefers Yes Luis Enrique Alexandre MD   blood glucose monitor strips Test 2 times a day & as needed forirregular blood glucose. Dispense sufficient amount for indicated testing. Insurance preferred brand Yes Luis Enrique Alexandre MD   pravastatin (PRAVACHOL) 40 MG tablet TAKE 1 TABLET EVERY NIGHT Yes Luis Enrique Alexandre MD   aspirin EC 81 MG EC tablet Take 1 tablet by mouth 2 times daily for 14 days Please avoid missing doses.  Yes MICAELA Amado CNP   Multiple Vitamins-Minerals (CENTRUM SILVER PO) Take by mouth Yes Historical Provider, MD   vitamin B-12 (CYANOCOBALAMIN) 1000 MCG tablet Take 1,000 mcg by mouth daily Yes Historical Provider, MD   vitamin C (ASCORBIC ACID) 500 MG tablet Take 500 mg by mouth daily Yes Historical Provider, MD   vitamin D (CHOLECALCIFEROL) 1000 UNIT TABS tablet Take 1,000 Units by mouth daily Yes Historical Provider, MD        Social History     Tobacco Use    Smoking status: Current Some Day Smoker     Packs/day: 0.25     Years: 50.00     Pack years: 12.50     Types: Cigarettes    Smokeless tobacco: Never Used    Tobacco comment: 5 cigarettes a day. Takes a puff here and there now quit 5 cig/ 2018.  is a smoker   Substance Use Topics    Alcohol use: No        Vitals:    07/30/21 1439   BP: 108/66   Site: Right Upper Arm   Position: Sitting   Cuff Size: Medium Adult   Pulse: 70   SpO2: 96%   Weight: 134 lb (60.8 kg)   Height: 5' 3\" (1.6 m)     Estimated body mass index is 23.74 kg/m² as calculated from the following:    Height as of this encounter: 5' 3\" (1.6 m). Weight as of this encounter: 134 lb (60.8 kg). Physical Exam  Vitals reviewed. Constitutional:       General: She is awake. Appearance: Normal appearance. She is well-developed, well-groomed and normal weight. She is not ill-appearing. HENT:      Head: Normocephalic and atraumatic. Right Ear: Hearing, tympanic membrane, ear canal and external ear normal.      Left Ear: Hearing, tympanic membrane, ear canal and external ear normal.      Nose: Nose normal.      Mouth/Throat:      Lips: Pink. Mouth: Mucous membranes are moist.      Pharynx: Oropharynx is clear. Eyes:      General: Lids are normal.      Extraocular Movements: Extraocular movements intact. Conjunctiva/sclera: Conjunctivae normal.      Pupils: Pupils are equal, round, and reactive to light. Neck:      Thyroid: No thyromegaly. Vascular: No carotid bruit. Cardiovascular:      Rate and Rhythm: Normal rate. Pulses:           Carotid pulses are 2+ on the right side and 2+ on the left side. Radial pulses are 2+ on the right side and 2+ on the left side. Posterior tibial pulses are 2+ on the right side and 2+ on the left side. Heart sounds: Normal heart sounds, S1 normal and S2 normal. No murmur heard. Pulmonary:      Effort: Pulmonary effort is normal.      Breath sounds: Normal breath sounds. Abdominal:      General: Bowel sounds are normal. There is no abdominal bruit. Palpations: Abdomen is soft.       Tenderness: There is no abdominal tenderness. Genitourinary:     Comments: Deferred  Musculoskeletal:         General: Normal range of motion. Cervical back: Full passive range of motion without pain, normal range of motion and neck supple. Right lower leg: No edema. Left lower leg: No edema. Lymphadenopathy:      Head:      Right side of head: No submental, submandibular, tonsillar, preauricular, posterior auricular or occipital adenopathy. Left side of head: No submental, submandibular, tonsillar, preauricular, posterior auricular or occipital adenopathy. Cervical: No cervical adenopathy. Right cervical: No superficial, deep or posterior cervical adenopathy. Left cervical: No superficial, deep or posterior cervical adenopathy. Upper Body:      Right upper body: No supraclavicular adenopathy. Left upper body: No supraclavicular adenopathy. Skin:     General: Skin is warm and dry. Capillary Refill: Capillary refill takes less than 2 seconds. Neurological:      General: No focal deficit present. Mental Status: She is alert and oriented to person, place, and time. Mental status is at baseline. Sensory: Sensation is intact. Motor: Motor function is intact. Coordination: Coordination is intact. Gait: Gait is intact. Psychiatric:         Attention and Perception: Attention and perception normal.         Mood and Affect: Mood and affect normal.         Speech: Speech normal.         Behavior: Behavior normal. Behavior is cooperative. Thought Content: Thought content normal.         Cognition and Memory: Cognition and memory normal.         Judgment: Judgment normal.         ASSESSMENT/PLAN:  1. Acute cystitis with hematuria  -     POCT Urinalysis no Micro  -     Culture, Urine  -     nitrofurantoin, macrocrystal-monohydrate, (MACROBID) 100 MG capsule;  Take 1 capsule by mouth 2 times daily for 7 days, Disp-14 capsule, R-0Normal   Repeat UA/cx in 10-14 days since this is second UTI in 3 months   Avoid/limit 4 Cs- carbonation, caffeine, citrus, and chocolate as these are bladder irritants  Urinate before and after sexual intercourse  Push fluids, water is best  Wipe front to back  Use mild unscented soap for demarco area  Avoid bubble baths, keep baths short  Minimize or avoid hot tub use  Take antibiotic in its entirety even if feeling better  Information printed and reviewed    2. Unintentional weight loss  -     CT Lung Screen (Initial or Annual); Future   Discussed diet, increasing caloric intake- high protein, high calorie recommended  3. Tobacco abuse  -     CT Lung Screen (Initial or Annual); Future    Pt would like CT screening of lungs to rule out cancer. Pt never truly quit smoking and still smokes her 's cigarettes and he smokes around her so she has been a smoker for 50 years and exposed to secondhand smoke for 63 years   Information printed and reviewed   Scheduling information given      Care Gaps Addressed  Call insurance company to discuss coverage for shingles vaccine (Shingrix) 2 dose series   AWV due next year    I have reviewed patient's pertinent medical history, relevant laboratory and imaging studies, and past/future health maintenance. Discussed with the patient the importance of adhering to their current medication regimen as directed. Advised the patient that they should continue to work on eating a healthy balanced diet and staying active by exercising within their personal limits. Orders as listed above. Patient was advised to keep future appointments with their respective specialty care team(s). Patient had the opportunity to ask questions, all of which were answered to the best of my ability and with patient satisfaction. Patient understands and is agreeable with the care plan following today's visit. Patient is to schedule an appointment for any new or worsening symptoms.  Go to ER for significant shortness of breath, chest pain, or uncontrolled pain or fever. I discussed with patient the risk and benefits of any medications that were prescribed today. I verified that the patient understands their medications, labs, and/or procedures. The patient is doing well with current medication regimen and does not have any barriers to adherence. The patient's self-management abilities are good. Return in about 4 months (around 11/30/2021) for Chronic Condition Check Up. An electronic signature was used to authenticate this note.     --MICAELA Au - CNP on 7/30/2021 at 3:23 PM

## 2021-08-02 LAB
ORGANISM: ABNORMAL
URINE CULTURE, ROUTINE: ABNORMAL

## 2021-08-16 ENCOUNTER — NURSE ONLY (OUTPATIENT)
Dept: FAMILY MEDICINE CLINIC | Age: 81
End: 2021-08-16
Payer: MEDICARE

## 2021-08-16 DIAGNOSIS — R39.9 UTI SYMPTOMS: Primary | ICD-10-CM

## 2021-08-16 DIAGNOSIS — N30.01 ACUTE CYSTITIS WITH HEMATURIA: ICD-10-CM

## 2021-08-16 LAB
BILIRUBIN, POC: NORMAL
BLOOD URINE, POC: NORMAL
CLARITY, POC: CLEAR
COLOR, POC: YELLOW
GLUCOSE URINE, POC: NORMAL
KETONES, POC: NORMAL
LEUKOCYTE EST, POC: NORMAL
NITRITE, POC: NORMAL
PH, POC: 6
PROTEIN, POC: NORMAL
SPECIFIC GRAVITY, POC: 1.02
UROBILINOGEN, POC: 0.2

## 2021-08-16 PROCEDURE — 81002 URINALYSIS NONAUTO W/O SCOPE: CPT | Performed by: NURSE PRACTITIONER

## 2021-08-17 ENCOUNTER — HOSPITAL ENCOUNTER (OUTPATIENT)
Dept: CT IMAGING | Age: 81
Discharge: HOME | End: 2021-08-17
Payer: MEDICARE

## 2021-08-17 DIAGNOSIS — R63.4 UNINTENTIONAL WEIGHT LOSS: ICD-10-CM

## 2021-08-17 DIAGNOSIS — Z72.0 TOBACCO ABUSE: ICD-10-CM

## 2021-08-17 PROCEDURE — 71271 CT THORAX LUNG CANCER SCR C-: CPT

## 2021-08-18 PROBLEM — K44.9 HIATAL HERNIA: Status: ACTIVE | Noted: 2021-08-17

## 2021-08-18 PROBLEM — J43.8 OTHER EMPHYSEMA (HCC): Status: ACTIVE | Noted: 2021-08-17

## 2021-08-18 PROBLEM — R91.1 PULMONARY NODULE: Status: ACTIVE | Noted: 2021-08-17

## 2021-08-18 PROBLEM — I25.84 CORONARY ARTERY CALCIFICATION: Status: ACTIVE | Noted: 2021-08-17

## 2021-08-18 PROBLEM — I35.9 AORTIC VALVE CALCIFICATION: Status: ACTIVE | Noted: 2021-08-17

## 2021-08-18 PROBLEM — I25.10 CORONARY ARTERY CALCIFICATION: Status: ACTIVE | Noted: 2021-08-17

## 2021-08-23 DIAGNOSIS — Z79.899 LONG-TERM USE OF HIGH-RISK MEDICATION: ICD-10-CM

## 2021-08-23 DIAGNOSIS — F41.9 ANXIETY: ICD-10-CM

## 2021-08-23 DIAGNOSIS — G25.81 RESTLESS LEG SYNDROME: ICD-10-CM

## 2021-08-23 NOTE — TELEPHONE ENCOUNTER
LAST SAME DAY WITH [facility] 7/30/2021   Return in about 4 months (around 11/30/2021) for Chronic Condition Check Up. LAST OV AWV WITH [facility] 6/8/2021  No future appointments.   RX PENDED

## 2021-08-24 RX ORDER — CLONAZEPAM 1 MG/1
TABLET ORAL
Qty: 30 TABLET | Refills: 1 | Status: SHIPPED | OUTPATIENT
Start: 2021-08-24 | End: 2021-12-10 | Stop reason: SDUPTHER

## 2021-10-22 ENCOUNTER — OFFICE VISIT (OUTPATIENT)
Dept: FAMILY MEDICINE CLINIC | Age: 81
End: 2021-10-22
Payer: MEDICARE

## 2021-10-22 VITALS
RESPIRATION RATE: 18 BRPM | WEIGHT: 132 LBS | SYSTOLIC BLOOD PRESSURE: 130 MMHG | OXYGEN SATURATION: 98 % | HEIGHT: 63 IN | HEART RATE: 60 BPM | BODY MASS INDEX: 23.39 KG/M2 | DIASTOLIC BLOOD PRESSURE: 82 MMHG

## 2021-10-22 DIAGNOSIS — R63.4 UNINTENTIONAL WEIGHT LOSS: ICD-10-CM

## 2021-10-22 DIAGNOSIS — J43.8 OTHER EMPHYSEMA (HCC): ICD-10-CM

## 2021-10-22 DIAGNOSIS — E78.2 MIXED HYPERLIPIDEMIA: ICD-10-CM

## 2021-10-22 DIAGNOSIS — Z79.899 LONG-TERM USE OF HIGH-RISK MEDICATION: ICD-10-CM

## 2021-10-22 DIAGNOSIS — R73.03 PREDIABETES: Primary | ICD-10-CM

## 2021-10-22 DIAGNOSIS — Z23 NEED FOR INFLUENZA VACCINATION: ICD-10-CM

## 2021-10-22 LAB
A/G RATIO: 1.7 (ref 1.1–2.2)
ALBUMIN SERPL-MCNC: 4.3 G/DL (ref 3.4–5)
ALCOHOL URINE: ABNORMAL
ALP BLD-CCNC: 63 U/L (ref 40–129)
ALT SERPL-CCNC: 12 U/L (ref 10–40)
AMPHETAMINE SCREEN, URINE: ABNORMAL
ANION GAP SERPL CALCULATED.3IONS-SCNC: 10 MMOL/L (ref 3–16)
AST SERPL-CCNC: 25 U/L (ref 15–37)
BARBITURATE SCREEN, URINE: ABNORMAL
BASOPHILS ABSOLUTE: 0 K/UL (ref 0–0.2)
BASOPHILS RELATIVE PERCENT: 0.1 %
BENZODIAZEPINE SCREEN, URINE: ABNORMAL
BILIRUB SERPL-MCNC: 0.5 MG/DL (ref 0–1)
BUN BLDV-MCNC: 22 MG/DL (ref 7–20)
BUPRENORPHINE URINE: ABNORMAL
CALCIUM SERPL-MCNC: 9.5 MG/DL (ref 8.3–10.6)
CHLORIDE BLD-SCNC: 106 MMOL/L (ref 99–110)
CHOLESTEROL, TOTAL: 191 MG/DL (ref 0–199)
CO2: 27 MMOL/L (ref 21–32)
COCAINE METABOLITE SCREEN URINE: ABNORMAL
CREAT SERPL-MCNC: 0.8 MG/DL (ref 0.6–1.2)
EOSINOPHILS ABSOLUTE: 0.1 K/UL (ref 0–0.6)
EOSINOPHILS RELATIVE PERCENT: 2.1 %
FENTANYL SCREEN, URINE: ABNORMAL
GABAPENTIN SCREEN, URINE: ABNORMAL
GFR AFRICAN AMERICAN: >60
GFR NON-AFRICAN AMERICAN: >60
GLOBULIN: 2.6 G/DL
GLUCOSE BLD-MCNC: 86 MG/DL (ref 70–99)
HBA1C MFR BLD: 5.5 %
HCT VFR BLD CALC: 49.3 % (ref 36–48)
HDLC SERPL-MCNC: 61 MG/DL (ref 40–60)
HEMOGLOBIN: 15.9 G/DL (ref 12–16)
LDL CHOLESTEROL CALCULATED: 104 MG/DL
LYMPHOCYTES ABSOLUTE: 2 K/UL (ref 1–5.1)
LYMPHOCYTES RELATIVE PERCENT: 35.2 %
MCH RBC QN AUTO: 29.5 PG (ref 26–34)
MCHC RBC AUTO-ENTMCNC: 32.2 G/DL (ref 31–36)
MCV RBC AUTO: 91.6 FL (ref 80–100)
MDMA URINE: ABNORMAL
METHADONE SCREEN, URINE: ABNORMAL
METHAMPHETAMINE, URINE: ABNORMAL
MONOCYTES ABSOLUTE: 0.4 K/UL (ref 0–1.3)
MONOCYTES RELATIVE PERCENT: 6.8 %
NEUTROPHILS ABSOLUTE: 3.2 K/UL (ref 1.7–7.7)
NEUTROPHILS RELATIVE PERCENT: 55.8 %
OPIATE SCREEN URINE: ABNORMAL
OXYCODONE SCREEN URINE: ABNORMAL
PDW BLD-RTO: 14.4 % (ref 12.4–15.4)
PHENCYCLIDINE SCREEN URINE: ABNORMAL
PLATELET # BLD: 175 K/UL (ref 135–450)
PMV BLD AUTO: 8.4 FL (ref 5–10.5)
POTASSIUM SERPL-SCNC: 4.9 MMOL/L (ref 3.5–5.1)
PROPOXYPHENE SCREEN, URINE: ABNORMAL
RBC # BLD: 5.37 M/UL (ref 4–5.2)
SODIUM BLD-SCNC: 143 MMOL/L (ref 136–145)
SYNTHETIC CANNABINOIDS(K2) SCREEN, URINE: ABNORMAL
THC SCREEN, URINE: ABNORMAL
TOTAL PROTEIN: 6.9 G/DL (ref 6.4–8.2)
TRAMADOL SCREEN URINE: ABNORMAL
TRICYCLIC ANTIDEPRESSANTS, UR: ABNORMAL
TRIGL SERPL-MCNC: 130 MG/DL (ref 0–150)
VLDLC SERPL CALC-MCNC: 26 MG/DL
WBC # BLD: 5.7 K/UL (ref 4–11)

## 2021-10-22 PROCEDURE — 99214 OFFICE O/P EST MOD 30 MIN: CPT | Performed by: NURSE PRACTITIONER

## 2021-10-22 PROCEDURE — 36415 COLL VENOUS BLD VENIPUNCTURE: CPT | Performed by: NURSE PRACTITIONER

## 2021-10-22 PROCEDURE — G0008 ADMIN INFLUENZA VIRUS VAC: HCPCS | Performed by: NURSE PRACTITIONER

## 2021-10-22 PROCEDURE — G8484 FLU IMMUNIZE NO ADMIN: HCPCS | Performed by: NURSE PRACTITIONER

## 2021-10-22 PROCEDURE — G8399 PT W/DXA RESULTS DOCUMENT: HCPCS | Performed by: NURSE PRACTITIONER

## 2021-10-22 PROCEDURE — G8420 CALC BMI NORM PARAMETERS: HCPCS | Performed by: NURSE PRACTITIONER

## 2021-10-22 PROCEDURE — 90694 VACC AIIV4 NO PRSRV 0.5ML IM: CPT | Performed by: NURSE PRACTITIONER

## 2021-10-22 PROCEDURE — 1123F ACP DISCUSS/DSCN MKR DOCD: CPT | Performed by: NURSE PRACTITIONER

## 2021-10-22 PROCEDURE — 4004F PT TOBACCO SCREEN RCVD TLK: CPT | Performed by: NURSE PRACTITIONER

## 2021-10-22 PROCEDURE — 83036 HEMOGLOBIN GLYCOSYLATED A1C: CPT | Performed by: NURSE PRACTITIONER

## 2021-10-22 PROCEDURE — G8926 SPIRO NO PERF OR DOC: HCPCS | Performed by: NURSE PRACTITIONER

## 2021-10-22 PROCEDURE — 80305 DRUG TEST PRSMV DIR OPT OBS: CPT | Performed by: NURSE PRACTITIONER

## 2021-10-22 PROCEDURE — 3023F SPIROM DOC REV: CPT | Performed by: NURSE PRACTITIONER

## 2021-10-22 PROCEDURE — G8427 DOCREV CUR MEDS BY ELIG CLIN: HCPCS | Performed by: NURSE PRACTITIONER

## 2021-10-22 PROCEDURE — 1090F PRES/ABSN URINE INCON ASSESS: CPT | Performed by: NURSE PRACTITIONER

## 2021-10-22 PROCEDURE — 4040F PNEUMOC VAC/ADMIN/RCVD: CPT | Performed by: NURSE PRACTITIONER

## 2021-10-22 ASSESSMENT — ENCOUNTER SYMPTOMS
SINUS PRESSURE: 0
ABDOMINAL DISTENTION: 0
VOMITING: 0
SHORTNESS OF BREATH: 0
SINUS PAIN: 0
NAUSEA: 0
SORE THROAT: 0
COUGH: 0
DIARRHEA: 0
ABDOMINAL PAIN: 0
EYE DISCHARGE: 0
EYE PAIN: 0
WHEEZING: 0
EYE REDNESS: 0

## 2021-10-22 NOTE — LETTER
CONTROLLED SUBSTANCE MEDICATION AGREEMENT     Patient Name: Sharon Dorado  Patient YOB: 1940   I understand, that controlled substance medications may be used to help better manage my symptoms and to improve my ability to function at home, work and in social settings. However, I also understand that these medications do have risks, which have been discussed with me, including possible development of physical or psychological dependence. I understand that successful treatment requires mutual trust and honesty between me and my provider. I understand and agree that following this Medication Agreement is necessary in continuing my provider-patient relationship and the success of my treatment plan. Explanation from my Provider: Benefits and Goals of Controlled Substance Medications: There are two potential goals for your treatment: (1) decreased pain and suffering (2) improved daily life functions. There are many possible treatments for your chronic condition(s). Alternatives such as physical therapy, yoga, massage, home daily exercise, meditation, relaxation techniques, injections, chiropractic manipulations, surgery, cognitive therapy, hypnosis and many medications that are not habit-forming may be used. Use of controlled substance medications may be helpful, but they are unlikely to resolve all symptoms or restore all function. Explanation from my Provider: Risks of Controlled Substance Medications:  Opioid pain medications: These medications can lead to problems such as addiction/dependence, sedation, lightheadedness/dizziness, memory issues, falls, constipation, nausea, or vomiting. They may also impair the ability to drive or operate machinery. Additionally, these medications may lower testosterone levels, leading to loss of bone strength, stamina and sex drive.   They may cause problems with breathing, sleep apnea and reduced coughing, which is especially dangerous for patients with lung disease. Overdose or dangerous interactions with alcohol and other medications may occur, leading to death. Hyperalgesia may develop, which means patients receiving opioids for the treatment of pain may become more sensitive to certain painful stimuli, and in some cases, experience pain from ordinarily non-painful stimuli. Women between the ages of 14-53 who could become pregnant should carefully weigh the risks and benefits of opioids with their physicians, as these medications increase the risk of pregnancy complications, including miscarriage,  delivery and stillbirth. It is also possible for babies to be born addicted to opioids. Opioid dependence withdrawal symptoms may include; feelings of uneasiness, increased pain, irritability, belly pain, diarrhea, sweats and goose-flesh. Benzodiazepines and non-benzodiazepine sleep medications: These medications can lead to problems such as addiction/dependence, sedation, fatigue, lightheadedness, dizziness, incoordination, falls, depression, hallucinations, and impaired judgment, memory and concentration. The ability to drive and operate machinery may also be affected. Abnormal sleep-related behaviors have been reported, including sleepwalking, driving, making telephone calls, eating, or having sex while not fully awake. These medications can suppress breathing and worsen sleep apnea, particularly when combined with alcohol or other sedating medications, potentially leading to death. Dependence withdrawal symptoms may include tremors, anxiety, hallucinations and seizures. Stimulants:  Common adverse effects include addiction/dependence, increased blood  pressure and heart rate, decreased appetite, nausea, involuntary weight loss, insomnia,                                                                                                                     Initials:_______   irritability, and headaches.   These risks may increase when these medications are combined with other stimulants, such as caffeine pills or energy drinks, certain weight loss supplements and oral decongestants. Dependence withdrawal symptoms may include depressed mood, loss of interest, suicidal thoughts, anxiety, fatigue, appetite changes and agitation. Testosterone replacement therapy:  Potential side effects include increased risk of stroke and heart attack, blood clots, increased blood pressure, increased cholesterol, enlarged prostate, sleep apnea, irritability/aggression and other mood disorders, and decreased fertility. I agree and understand that I and my prescriber have the following rights and responsibilities regarding my treatment plan:     1. MY RIGHTS:  To be informed of my treatment and medication plan. To be an active participant in my health and wellbeing. 2. MY RESPONSIBILITY AND UNDERSTANDING FOR USE OF MEDICATIONS   I will take medications at the dose and frequency as directed. For my safety, I will not increase or change how I take my medications without the recommendation of my healthcare provider.  I will actively participate in any program recommended by my provider which may improve function, including social, physical, psychological programs.  I will not take my medications with alcohol or other drugs not prescribed to me. I understand that drinking alcohol with my medications increases the chances of side effects, including reduced breathing rate and could lead to personal injury when operating machinery.  I understand that if I have a history of substance use disorders, including alcohol or other illicit drugs, that I may be at increased risk of addiction to my medications.  I agree to notify my provider immediately if I should become pregnant so that my treatment plan can be adjusted.    I agree and understand that I shall only receive controlled substance medications from the prescriber that signed this agreement unless there is written agreement among other prescribers of controlled substances outlining the responsibility of the medications being prescribed.  I understand that the if the controlled medication is not helping to achieve goals, the dosage may be tapered and no longer prescribed. 3. MY RESPONSIBILITY FOR COMMUNICATION / PRESCRIPTION RENEWALS   I agree that all controlled substance medications that I take will be prescribed only by my provider. If another healthcare provider prescribes me medication in an emergency, I will notify my provider within seventy-two (72) hours.  I will arrange for refills at the prescribed interval ONLY during regular office hours. I will not ask for refills earlier than agreed, after-hours, on holidays or weekends. Refills may take up to 72 hours for processing and prescriptions to reach the pharmacy.  I will inform my other health care providers that I am taking these medications and of the existence of this [id number]. In the event of an emergency, I will provide the same information to the emergency department prescribers.  I will keep my provider updated on the pharmacy I am using for controlled medication prescription filling. Initials:_______  4. MY RESPONSIBILITY FOR PROTECTING MEDICATIONS   I will protect my prescriptions and medications. I understand that lost or misplaced prescriptions will not be replaced.  I will keep medications only for my own use and will not share them with others. I will keep all medications away from children.  I agree that if my medications are adjusted or discontinued, I will properly dispose of any remaining medications. I understand that I will be required to dispose of any remaining controlled medications as, directed by my prescriber, prior to being provided with any prescriptions for other controlled medications.   Medication drop box locations can be found at: [email or web address]    5. MY RESPONSIBILITY WITH ILLEGAL DRUGS    I will not use illegal or street drugs or another person's prescription medications not prescribed to me.  If there are identified addiction type symptoms, then referral to a program may be provided by my provider and I agree to follow through with this recommendation. 6. MY RESPONSIBILITY FOR COOPERATION WITH INVESTIGATIONS   I understand that my provider will comply with any applicable law and may discuss my use and/or possible misuse/abuse of controlled substances and alcohol, as appropriate, with any health care provider involved in my care, pharmacist, or legal authority.  I authorize my provider and pharmacy to cooperate fully with law enforcement agencies (as permitted by law) in the investigation of any possible misuse, sale, or other diversion of my controlled substances.  I agree to waive any applicable privilege or right of privacy or confidentiality with respect to these authorizations. 7. PROVIDERS RIGHT TO MONITOR FOR SAFETY: PRESCRIPTION MONITORING / DRUG TESTING   I consent to drug/toxicology screening and will submit to a drug screen upon my providers request to assure I am only taking the prescribed drugs for my safety monitoring. I understand that a drug screen is a laboratory test in which a sample of my urine, blood or saliva is checked to see what drugs I have been taking. This may entail an observed urine specimen, which means that a nurse or other health care provider may watch me provide urine, and I will cooperate if I am asked to provide an observed specimen.  I understand that my provider will check a copy of my State Prescription Monitoring Program () Report in order to safely prescribe medications.  Pill Counts: I consent to pill counts when requested.   I may be asked to bring all my prescribed controlled substance medications, in their original bottles, to all of my scheduled appointments. In addition, my provider may ask me to come to the practice at any time for a random pill count. 8. TERMINATION OF THIS AGREEMENT  For my safety, my prescriber has the right to stop prescribing controlled substance medications and may end this agreement. Initials:_______   Conditions that may result in termination of this agreement:  a. I do not show any improvement in pain, or my activity has not improved. b. I develop rapid tolerance or loss of improvement, as described in my treatment plan.  c. I develop significant side effects from the medication. d. My behavior is not consistent with the responsibilities outlined above, thereby causing safety concerns to continue prescribing controlled substance medications. e. I fail to follow the terms of this agreement. f. Other:____________________________       UNDERSTANDING THIS MEDICATION AGREEMENT:    I have read the above and have had all my questions answered. For chronic disease management, I know that my symptoms can be managed with many types of treatments. A chronic medication trial may be part of my treatment, but I must be an active participant in my care. Medication therapy is only one part of my symptom management plan. In some cases, there may be limited scientific evidence to support the chronic use of certain medications to improve symptoms and daily function. Furthermore, in certain circumstances, there may be scientific information that suggests that the use of chronic controlled substances may worsen my symptoms and increase my risk of unintentional death directly related to this medication therapy. I know that if my provider feels my risk from controlled medications is greater than my benefit, I will have my controlled substance medication(s) compassionately lowered or removed altogether.      I further agree to allow this office to

## 2021-10-22 NOTE — PROGRESS NOTES
Vaccine Information Sheet, \"Influenza - Inactivated\"  given to Kirt Fierro, or parent/legal guardian of  Kirt Fierro and verbalized understanding. Patient responses:    Have you ever had a reaction to a flu vaccine? No  Do you have any current illness? No  Have you ever had Guillian Woolrich Syndrome? No  Do you have a serious allergy to any of the follow: Neomycin, Polymyxin, Thimerosal, eggs or egg products? No    Flu vaccine given per order. Please see immunization tab. Risks and benefits explained. Current VIS given. Consent signed.     Immunizations Administered     Name Date Dose Route    Influenza, Quadv, adjuvanted, 65 yrs +, IM, PF (Fluad) 10/22/2021 0.5 mL Intramuscular    Site: Deltoid- Left    Lot: [id number]    NDC: 71333-289-81

## 2021-10-22 NOTE — PROGRESS NOTES
Keely Paulino (:  1940) is a 79 y.o. female,Established patient, here for evaluation of the following chief complaint(s):  Diabetes (ROUTINE DM F/U, FLU SHOT, )      ASSESSMENT/PLAN:  1. Prediabetes  -A1c 5.5% today. Educated to the patient that this means the patient's diabetes has resolved. No additional intervention is indicated. Educated to the patient that since her A1c is better, she should focus on having adequate dietary intake. She can take Ensure without issue. We will also check labs to assess weight loss, but given the patient's lack of caloric intake and decreased carb intake, the weight loss seems to correlate appropriately. -     POCT glycosylated hemoglobin (Hb A1C)  -     Comprehensive Metabolic Panel; Future  2. Other emphysema ([address]  -Managed without inhaler. CT performed in August did show emphysema and some small nodules. Repeat CT in February. 3. Long-term use of high-risk medication  -UDS was positive for opioids lightly. Patient does not take any opioids to our knowledge. Sending for high resolution.  -     POCT Rapid Drug Screen  4. Need for influenza vaccination  -     INFLUENZA, QUADV, ADJUVANTED, 65 YRS =, IM, PF, PREFILL SYR, 0.5ML (FLUAD)  5. Mixed hyperlipidemia  -Controlled on pravastatin. No changes pending labs. Noted moderate to severe calcification on CT of the lungs performed in August.  Consider adjustment to a higher intensity statin depending on results. -     Lipid Panel; Future  6. Unintentional weight loss  -Educated to the patient that the weight loss is likely related to low-carb and low caloric intake. Will assess CBC to rule out other causes. Other labs have already been checked in the past related to this. Encouraged to use protein shakes including Ensure or boost.  Educated the patient that since her A1c is down to 5.5% this will not be a problem.   -     CBC Auto Differential; Future      Return in about 3 months (around 2022) for FOLLOW-UP ANXIETY. SUBJECTIVE/OBJECTIVE:  HPI  Supa Gould presents today for prediabetes follow-up, anxiety follow-up, and follow-up of weight loss. She states that she continues to lose weight slowly. She would like to not lose anymore weight. She states that when her A1c was higher, she was told that she could become diabetic which would make her kidneys fail. This scared her to the point where she is afraid to eat. She has been lowering the carbs in her diet. She states her appetite has been poor also. She had bought some ensures to try to help with this, but she was worried about the sugar. Her daughter is a nurse  since and told her to ask about having lab work done today. Supa Gould continues to take clonazepam for restless leg. Takes at night to help her sleep. She states this works well for her. Typically takes 1/2 tablet. She is due for UDS and med contract today. Tolerates well without side effects. Would like the medication continued as ordered. Supa Gould would like the her flu shot today. She plans on getting the COVID-19 booster in the upcoming weeks. She had a CT lung screening done in August which showed pulmonary nodules. Plan to repeat in 6 months. Review of Systems   Constitutional: Positive for appetite change and unexpected weight change. Negative for chills and fever. HENT: Negative for ear discharge, ear pain, hearing loss, sinus pressure, sinus pain and sore throat. Eyes: Negative for pain, discharge and redness. Respiratory: Negative for cough, shortness of breath and wheezing. Cardiovascular: Negative for chest pain and palpitations. Gastrointestinal: Negative for abdominal distention, abdominal pain, diarrhea, nausea and vomiting. Genitourinary: Negative for dysuria and hematuria. Musculoskeletal: Negative for myalgias. Skin: Negative for rash. Neurological: Negative for dizziness, weakness, light-headedness, numbness and headaches. Psychiatric/Behavioral: Negative for decreased concentration, dysphoric mood and sleep disturbance. The patient is not nervous/anxious. Physical Exam  Constitutional:       General: She is not in acute distress. Appearance: Normal appearance. She is normal weight. HENT:      Head: Normocephalic and atraumatic. Right Ear: Tympanic membrane, ear canal and external ear normal.      Left Ear: Tympanic membrane, ear canal and external ear normal.      Mouth/Throat:      Mouth: Mucous membranes are moist.   Eyes:      Extraocular Movements: Extraocular movements intact. Conjunctiva/sclera: Conjunctivae normal.      Pupils: Pupils are equal, round, and reactive to light. Neck:      Thyroid: No thyromegaly. Vascular: No carotid bruit. Cardiovascular:      Rate and Rhythm: Normal rate and regular rhythm. Pulses: Normal pulses. Heart sounds: Normal heart sounds. No murmur heard. No gallop. Pulmonary:      Effort: Pulmonary effort is normal.      Breath sounds: Normal breath sounds. No wheezing. Abdominal:      General: Bowel sounds are normal.      Palpations: Abdomen is soft. Tenderness: There is no abdominal tenderness. There is no guarding or rebound. Musculoskeletal:         General: Normal range of motion. Cervical back: Normal range of motion and neck supple. Lymphadenopathy:      Cervical: No cervical adenopathy. Skin:     General: Skin is warm and dry. Capillary Refill: Capillary refill takes less than 2 seconds. Neurological:      Mental Status: She is alert and oriented to person, place, and time. Psychiatric:         Mood and Affect: Mood normal.         Behavior: Behavior normal.         Thought Content: Thought content normal.         Judgment: Judgment normal.               This dictation was generated by voice recognition computer software.   Although all attempts are made to edit the dictation for accuracy, there may be errors in the transcription that are not intended. An electronic signature was used to authenticate this note.     --Torey Jimenez, APRN - CNP

## 2021-10-25 DIAGNOSIS — E78.2 MIXED HYPERLIPIDEMIA: Primary | ICD-10-CM

## 2021-10-25 RX ORDER — ATORVASTATIN CALCIUM 40 MG/1
40 TABLET, FILM COATED ORAL DAILY
Qty: 90 TABLET | Refills: 1 | Status: SHIPPED | OUTPATIENT
Start: 2021-10-25 | End: 2022-04-14

## 2021-10-27 LAB
6-ACETYLMORPHINE: NOT DETECTED
7-AMINOCLONAZEPAM: PRESENT
ALPHA-OH-ALPRAZOLAM: NOT DETECTED
ALPHA-OH-MIDAZOLAM, URINE: NOT DETECTED
ALPRAZOLAM: NOT DETECTED
AMPHETAMINE: NOT DETECTED
BARBITURATES: NOT DETECTED
BENZOYLECGONINE: NOT DETECTED
BUPRENORPHINE: NOT DETECTED
CARISOPRODOL: NOT DETECTED
CLONAZEPAM: NOT DETECTED
CODEINE: NOT DETECTED
CREATININE URINE: 96.6 MG/DL (ref 20–400)
DIAZEPAM: NOT DETECTED
DRUGS EXPECTED: NORMAL
EER PAIN MGT DRUG PANEL, HIGH RES/EMIT U: NORMAL
ETHYL GLUCURONIDE: NOT DETECTED
FENTANYL: NOT DETECTED
GABAPENTIN: NOT DETECTED
HYDROCODONE: NOT DETECTED
HYDROMORPHONE: NOT DETECTED
LORAZEPAM: NOT DETECTED
MARIJUANA METABOLITE: NOT DETECTED
MDA: NOT DETECTED
MDEA: NOT DETECTED
MDMA URINE: NOT DETECTED
MEPERIDINE: NOT DETECTED
METHADONE: NOT DETECTED
METHAMPHETAMINE: NOT DETECTED
METHYLPHENIDATE: NOT DETECTED
MIDAZOLAM: NOT DETECTED
MORPHINE: NOT DETECTED
NALOXONE: NOT DETECTED
NORBUPRENORPHINE, FREE: NOT DETECTED
NORDIAZEPAM: NOT DETECTED
NORFENTANYL: NOT DETECTED
NORHYDROCODONE, URINE: NOT DETECTED
NOROXYCODONE: NOT DETECTED
NOROXYMORPHONE, URINE: NOT DETECTED
OXAZEPAM: NOT DETECTED
OXYCODONE: NOT DETECTED
OXYMORPHONE: NOT DETECTED
PAIN MANAGEMENT DRUG PANEL: NORMAL
PAIN MANAGEMENT DRUG PANEL: NORMAL
PCP: NOT DETECTED
PHENTERMINE: NOT DETECTED
PREGABALIN: NOT DETECTED
TAPENTADOL, URINE: NOT DETECTED
TAPENTADOL-O-SULFATE, URINE: NOT DETECTED
TEMAZEPAM: NOT DETECTED
TRAMADOL: NOT DETECTED
ZOLPIDEM: NOT DETECTED

## 2021-11-23 NOTE — PATIENT INSTRUCTIONS
Call insurance company to discuss coverage for shingles vaccine (Shingrix) 2 dose series     Annual Wellness Visit recommended before the end of the year. Could try debrox, over the counter ear wax softener for natural or easier removal    Patient Education        Influenza (Flu) Vaccine (Inactivated or Recombinant): What You Need to Know  Why get vaccinated? Influenza vaccine can prevent influenza (flu). Flu is a contagious disease that spreads around the [place] every year, usually between October and May. Anyone can get the flu, but it is more dangerous for some people. Infants and young children, people 72years of age and older, pregnant women, and people with certain health conditions or a weakened immune system are at greatest risk of flu complications. Pneumonia, bronchitis, sinus infections and ear infections are examples of flu-related complications. If you have a medical condition, such as heart disease, cancer or diabetes, flu can make it worse. Flu can cause fever and chills, sore throat, muscle aches, fatigue, cough, headache, and runny or stuffy nose. Some people may have vomiting and diarrhea, though this is more common in children than adults. Each year, thousands of people in the [facility] die from flu, and many more are hospitalized. Flu vaccine prevents millions of illnesses and flu-related visits to the doctor each year. Influenza vaccine  CDC recommends everyone 10months of age and older get vaccinated every flu season. Children 6 months through 6years of age may need 2 doses during a single flu season. Everyone else needs only 1 dose each flu season. It takes about 2 weeks for protection to develop after vaccination. There are many flu viruses, and they are always changing. Each year a new flu vaccine is made to protect against three or four viruses that are likely to cause disease in the upcoming flu season.  Even when the vaccine doesn't exactly match these viruses, it Notified the patient of the below response by the provider. Patient verbalized understanding. States stool is formed but episodes of loose stool (not liquid/watery). Episodes occur 2-3 times per week. Notified the patient of CXR.  Patient verbalized unde may still provide some protection. Influenza vaccine does not cause flu. Influenza vaccine may be given at the same time as other vaccines. Talk with your health care provider  Tell your vaccine provider if the person getting the vaccine:  · Has had an allergic reaction after a previous dose of influenza vaccine, or has any severe, life-threatening allergies. · Has ever had Guillain-Barré Syndrome (also called GBS). In some cases, your health care provider may decide to postpone influenza vaccination to a future visit. People with minor illnesses, such as a cold, may be vaccinated. People who are moderately or severely ill should usually wait until they recover before getting influenza vaccine. Your health care provider can give you more information. Risks of a vaccine reaction  · Soreness, redness, and swelling where shot is given, fever, muscle aches, and headache can happen after influenza vaccine. · There may be a very small increased risk of Guillain-Barré Syndrome (GBS) after inactivated influenza vaccine (the flu shot). Allegra Flow children who get the flu shot along with pneumococcal vaccine (PCV13), and/or DTaP vaccine at the same time might be slightly more likely to have a seizure caused by fever. Tell your health care provider if a child who is getting flu vaccine has ever had a seizure. People sometimes faint after medical procedures, including vaccination. Tell your provider if you feel dizzy or have vision changes or ringing in the ears. As with any medicine, there is a very remote chance of a vaccine causing a severe allergic reaction, other serious injury, or death. What if there is a serious problem? An allergic reaction could occur after the vaccinated person leaves the clinic.  If you see signs of a severe allergic reaction (hives, swelling of the face and throat, difficulty breathing, a fast heartbeat, dizziness, or weakness), call 9-1-1 and get the person to the nearest hospital.  For other signs that concern you, call your health care provider. Adverse reactions should be reported to the Vaccine Adverse Event Reporting System (VAERS). Your health care provider will usually file this report, or you can do it yourself. Visit the VAERS website at www.vaers. [facility].gov or call 9-232.535.6071. VAERS is only for reporting reactions, and VAERS staff do not give medical advice. The National Vaccine Injury Compensation Program  The National Vaccine Injury Compensation Program (VICP) is a federal program that was created to compensate people who may have been injured by certain vaccines. Visit the VICP website at www.[facility].gov/vaccinecompensation or call 7-483.140.9853 to learn about the program and about filing a claim. There is a time limit to file a claim for compensation. How can I learn more? · Ask your healthcare provider. · Call your local or state health department. · Contact the Centers for Disease Control and Prevention (CDC):  ? Call 6-434.850.4123 (1-800-CDC-INFO) or  ? Visit CDC's website at www.cdc.gov/flu  Vaccine Information Statement (Interim)  Inactivated Influenza Vaccine  8/15/2019  42 NAV Samano Brian [id number]-[id number]  Department of Health and Human Services  Centers for Disease Control and Prevention  Many Vaccine Information Statements are available in Kiswahili and other languages. See www.immunize.org/vis. Muchas hojas de información sobre vacunas están disponibles en español y en otros idiomas. Visite www.immunize.org/vis. Care instructions adapted under license by [employer]. If you have questions about a medical condition or this instruction, always ask your healthcare professional. [place] [address] any warranty or liability for your use of this information.

## 2021-12-10 DIAGNOSIS — Z79.899 LONG-TERM USE OF HIGH-RISK MEDICATION: ICD-10-CM

## 2021-12-10 DIAGNOSIS — G25.81 RESTLESS LEG SYNDROME: ICD-10-CM

## 2021-12-10 DIAGNOSIS — F41.9 ANXIETY: ICD-10-CM

## 2021-12-10 RX ORDER — CLONAZEPAM 1 MG/1
TABLET ORAL
Qty: 30 TABLET | Refills: 0 | Status: SHIPPED | OUTPATIENT
Start: 2021-12-10 | End: 2022-02-03 | Stop reason: SDUPTHER

## 2021-12-29 ENCOUNTER — OFFICE VISIT (OUTPATIENT)
Dept: ENT CLINIC | Age: 81
End: 2021-12-29

## 2021-12-29 VITALS — SYSTOLIC BLOOD PRESSURE: 130 MMHG | HEART RATE: 67 BPM | TEMPERATURE: 97.7 F | DIASTOLIC BLOOD PRESSURE: 78 MMHG

## 2021-12-29 DIAGNOSIS — Z53.21 PATIENT LEFT WITHOUT BEING SEEN: Primary | ICD-10-CM

## 2022-01-11 ENCOUNTER — TELEPHONE (OUTPATIENT)
Dept: FAMILY MEDICINE CLINIC | Age: 82
End: 2022-01-11

## 2022-01-11 NOTE — TELEPHONE ENCOUNTER
----- Message from Torin Farmer sent at 1/11/2022  2:55 PM EST -----  Subject: Message to Provider    QUESTIONS  Information for Provider? Pt returned call. please call pt   ---------------------------------------------------------------------------  --------------  CALL BACK INFO  What is the best way for the office to contact you? OK to leave message on   voicemail  Preferred Call Back Phone Number? [phone]  ---------------------------------------------------------------------------  --------------  SCRIPT ANSWERS  Relationship to Patient?  Self

## 2022-01-12 NOTE — TELEPHONE ENCOUNTER
ATTEMPTED TO CONTACT PATIENT, MAILBOX IS NOT SET UP YET, NEED TO ASK PATIENT IS SHE KNOWS WHO CALLED HER. I DO NOT SEE ANY MESSAGES FROM US THAT WE TRIED TO REACH HER.  SC

## 2022-02-02 DIAGNOSIS — G25.81 RESTLESS LEG SYNDROME: ICD-10-CM

## 2022-02-02 DIAGNOSIS — Z79.899 LONG-TERM USE OF HIGH-RISK MEDICATION: ICD-10-CM

## 2022-02-02 DIAGNOSIS — F41.9 ANXIETY: ICD-10-CM

## 2022-02-03 RX ORDER — CLONAZEPAM 1 MG/1
TABLET ORAL
Qty: 30 TABLET | Refills: 0 | Status: SHIPPED | OUTPATIENT
Start: 2022-02-03 | End: 2022-02-24 | Stop reason: SDUPTHER

## 2022-02-24 ENCOUNTER — OFFICE VISIT (OUTPATIENT)
Dept: FAMILY MEDICINE CLINIC | Age: 82
End: 2022-02-24
Payer: MEDICARE

## 2022-02-24 VITALS
WEIGHT: 140.4 LBS | HEIGHT: 63 IN | OXYGEN SATURATION: 98 % | SYSTOLIC BLOOD PRESSURE: 130 MMHG | RESPIRATION RATE: 16 BRPM | BODY MASS INDEX: 24.88 KG/M2 | DIASTOLIC BLOOD PRESSURE: 82 MMHG | HEART RATE: 60 BPM

## 2022-02-24 DIAGNOSIS — R73.03 PREDIABETES: ICD-10-CM

## 2022-02-24 DIAGNOSIS — G25.81 RESTLESS LEG SYNDROME: Primary | ICD-10-CM

## 2022-02-24 PROBLEM — N18.31 STAGE 3A CHRONIC KIDNEY DISEASE (HCC): Status: ACTIVE | Noted: 2022-02-24

## 2022-02-24 PROBLEM — N18.31 STAGE 3A CHRONIC KIDNEY DISEASE (HCC): Status: RESOLVED | Noted: 2022-02-24 | Resolved: 2022-02-24

## 2022-02-24 LAB — HBA1C MFR BLD: 5.5 %

## 2022-02-24 PROCEDURE — G8399 PT W/DXA RESULTS DOCUMENT: HCPCS | Performed by: NURSE PRACTITIONER

## 2022-02-24 PROCEDURE — 1090F PRES/ABSN URINE INCON ASSESS: CPT | Performed by: NURSE PRACTITIONER

## 2022-02-24 PROCEDURE — G8420 CALC BMI NORM PARAMETERS: HCPCS | Performed by: NURSE PRACTITIONER

## 2022-02-24 PROCEDURE — 83036 HEMOGLOBIN GLYCOSYLATED A1C: CPT | Performed by: NURSE PRACTITIONER

## 2022-02-24 PROCEDURE — G8484 FLU IMMUNIZE NO ADMIN: HCPCS | Performed by: NURSE PRACTITIONER

## 2022-02-24 PROCEDURE — 99213 OFFICE O/P EST LOW 20 MIN: CPT | Performed by: NURSE PRACTITIONER

## 2022-02-24 PROCEDURE — 4004F PT TOBACCO SCREEN RCVD TLK: CPT | Performed by: NURSE PRACTITIONER

## 2022-02-24 PROCEDURE — 4040F PNEUMOC VAC/ADMIN/RCVD: CPT | Performed by: NURSE PRACTITIONER

## 2022-02-24 PROCEDURE — 1123F ACP DISCUSS/DSCN MKR DOCD: CPT | Performed by: NURSE PRACTITIONER

## 2022-02-24 PROCEDURE — G8427 DOCREV CUR MEDS BY ELIG CLIN: HCPCS | Performed by: NURSE PRACTITIONER

## 2022-02-24 RX ORDER — CLONAZEPAM 1 MG/1
TABLET ORAL
Qty: 30 TABLET | Refills: 2 | Status: SHIPPED | OUTPATIENT
Start: 2022-03-05 | End: 2022-09-22

## 2022-02-24 ASSESSMENT — PATIENT HEALTH QUESTIONNAIRE - PHQ9
SUM OF ALL RESPONSES TO PHQ9 QUESTIONS 1 & 2: 0
SUM OF ALL RESPONSES TO PHQ QUESTIONS 1-9: 0
1. LITTLE INTEREST OR PLEASURE IN DOING THINGS: 0
SUM OF ALL RESPONSES TO PHQ QUESTIONS 1-9: 0
SUM OF ALL RESPONSES TO PHQ QUESTIONS 1-9: 0
2. FEELING DOWN, DEPRESSED OR HOPELESS: 0
SUM OF ALL RESPONSES TO PHQ QUESTIONS 1-9: 0

## 2022-02-24 NOTE — PROGRESS NOTES
Keely Paulino (:  1940) is a 79 y.o. female,Established patient, here for evaluation of the following chief complaint(s): Anxiety (FOLLOW UP ON ANXIETY)      SUBJECTIVE/OBJECTIVE:  HPI   ROUTINE  FOLLOW UP FOR RLS  SHE TAKES THE KLONOPIN  FOR HER RLS THAT  HELPS - IF SHE DOES NOT TAKE IT HER LEGS MOVE ALL NIGHT AND IT IS HARD TO GET TO SLEEP  SHE ONLY TAKES IT AT NIGHT AND HAS NOT HAD AN ISSUE WITH THE MEDICATION      SHE WOULD LIKE TO GET HER HGA1C CHECKED SHE DOES NOT CHECK HER  BLOOD SUGARS AT  HOME SHE  - BUT TRIES TO WATCH WHAT SHE EATS      Review of Systems   Endocrine: Negative. Musculoskeletal:        RLS- JERKY LEGS AT NIGHT- RESTING       Physical Exam  Vitals reviewed. Constitutional:       General: She is awake. She is not in acute distress. Appearance: Normal appearance. She is well-developed, well-groomed and normal weight. She is not ill-appearing, toxic-appearing or diaphoretic. Cardiovascular:      Rate and Rhythm: Normal rate and regular rhythm. Heart sounds: Normal heart sounds, S1 normal and S2 normal.   Pulmonary:      Effort: Pulmonary effort is normal.      Breath sounds: Normal breath sounds and air entry. Neurological:      Mental Status: She is alert and oriented to person, place, and time. Psychiatric:         Attention and Perception: Attention and perception normal.         Mood and Affect: Mood and affect normal.         Speech: Speech normal.         Behavior: Behavior normal. Behavior is cooperative. Thought Content: Thought content normal.         Cognition and Memory: Cognition and memory normal.         Judgment: Judgment normal.         Tan Green was seen today for anxiety.     Diagnoses and all orders for this visit:    Restless leg syndrome  Controlled substances monitoring: possible medication side effects, risk of tolerance and/or dependence, and alternative treatments discussed, no signs of potential drug abuse or diversion identified and OARRS report reviewed today- activity consistent with treatment plan. STABLE ON CURRENT MEDICATIONS  CONTINUE  clonazePAM (KLONOPIN) 1 MG tablet; FILL 3/5 TAKE ONE HALF TO ONE TABLET BY MOUTH NIGHTLY  FOLLOW UP IN THREE MONTHS    Prediabetes  -     POCT glycosylated hemoglobin (Hb A1C) 5.5            An electronic signature was used to authenticate this note.     --MICAELA Harper - CNP

## 2022-04-12 ENCOUNTER — TELEPHONE (OUTPATIENT)
Dept: CASE MANAGEMENT | Age: 82
End: 2022-04-12

## 2022-04-12 NOTE — TELEPHONE ENCOUNTER
Reminder letter sent for follow up LDCT per recommendations of LDCT completed 8/17/21. LRAD 3. Smoking cessation resources sent.

## 2022-04-14 DIAGNOSIS — E78.2 MIXED HYPERLIPIDEMIA: ICD-10-CM

## 2022-04-14 RX ORDER — ATORVASTATIN CALCIUM 40 MG/1
TABLET, FILM COATED ORAL
Qty: 90 TABLET | Refills: 1 | Status: SHIPPED | OUTPATIENT
Start: 2022-04-14 | End: 2022-10-27

## 2022-05-05 ENCOUNTER — TELEPHONE (OUTPATIENT)
Dept: CASE MANAGEMENT | Age: 82
End: 2022-05-05

## 2022-05-05 DIAGNOSIS — R91.8 PULMONARY NODULES: ICD-10-CM

## 2022-05-05 DIAGNOSIS — R93.89 ABNORMAL CT SCAN, CHEST: Primary | ICD-10-CM

## 2022-05-05 NOTE — TELEPHONE ENCOUNTER
Faviola Tay NP -     Patient due for follow up CT Chest per lung cancer screening CT from 8/17/21. If you would like patient to have this, please place order. I will contact patient to help schedule after order entered.     Thanks,    Daksha Wyatt RN  Lung Navigator  [address], [address] [place] [address]  [phone]

## 2022-05-06 ENCOUNTER — TELEPHONE (OUTPATIENT)
Dept: CASE MANAGEMENT | Age: 82
End: 2022-05-06

## 2022-06-30 ENCOUNTER — OFFICE VISIT (OUTPATIENT)
Dept: FAMILY MEDICINE CLINIC | Age: 82
End: 2022-06-30
Payer: MEDICARE

## 2022-06-30 VITALS
SYSTOLIC BLOOD PRESSURE: 124 MMHG | HEART RATE: 76 BPM | DIASTOLIC BLOOD PRESSURE: 72 MMHG | WEIGHT: 130 LBS | OXYGEN SATURATION: 98 % | HEIGHT: 63 IN | BODY MASS INDEX: 23.04 KG/M2

## 2022-06-30 DIAGNOSIS — R73.03 PREDIABETES: ICD-10-CM

## 2022-06-30 DIAGNOSIS — R63.4 WEIGHT LOSS: ICD-10-CM

## 2022-06-30 DIAGNOSIS — E78.2 MIXED HYPERLIPIDEMIA: ICD-10-CM

## 2022-06-30 DIAGNOSIS — R10.9 ABDOMINAL PAIN, UNSPECIFIED ABDOMINAL LOCATION: Primary | ICD-10-CM

## 2022-06-30 DIAGNOSIS — M19.90 ARTHRITIS: ICD-10-CM

## 2022-06-30 DIAGNOSIS — J43.8 OTHER EMPHYSEMA (HCC): ICD-10-CM

## 2022-06-30 DIAGNOSIS — F41.9 ANXIETY: ICD-10-CM

## 2022-06-30 DIAGNOSIS — M85.80 OSTEOPENIA, UNSPECIFIED LOCATION: ICD-10-CM

## 2022-06-30 LAB
A/G RATIO: 1.8 (ref 1.1–2.2)
ALBUMIN SERPL-MCNC: 4.2 G/DL (ref 3.4–5)
ALP BLD-CCNC: 66 U/L (ref 40–129)
ALT SERPL-CCNC: 14 U/L (ref 10–40)
ANION GAP SERPL CALCULATED.3IONS-SCNC: 13 MMOL/L (ref 3–16)
AST SERPL-CCNC: 28 U/L (ref 15–37)
BASOPHILS ABSOLUTE: 0 K/UL (ref 0–0.2)
BASOPHILS RELATIVE PERCENT: 0.7 %
BILIRUB SERPL-MCNC: 0.4 MG/DL (ref 0–1)
BUN BLDV-MCNC: 26 MG/DL (ref 7–20)
CALCIUM SERPL-MCNC: 9.7 MG/DL (ref 8.3–10.6)
CHLORIDE BLD-SCNC: 100 MMOL/L (ref 99–110)
CO2: 27 MMOL/L (ref 21–32)
CREAT SERPL-MCNC: 1 MG/DL (ref 0.6–1.2)
EOSINOPHILS ABSOLUTE: 0.1 K/UL (ref 0–0.6)
EOSINOPHILS RELATIVE PERCENT: 1.6 %
GFR AFRICAN AMERICAN: >60
GFR NON-AFRICAN AMERICAN: 53
GLUCOSE BLD-MCNC: 93 MG/DL (ref 70–99)
HCT VFR BLD CALC: 44.1 % (ref 36–48)
HEMOGLOBIN: 15 G/DL (ref 12–16)
LIPASE: 47 U/L (ref 13–60)
LYMPHOCYTES ABSOLUTE: 2.6 K/UL (ref 1–5.1)
LYMPHOCYTES RELATIVE PERCENT: 36.1 %
MCH RBC QN AUTO: 31.1 PG (ref 26–34)
MCHC RBC AUTO-ENTMCNC: 34.1 G/DL (ref 31–36)
MCV RBC AUTO: 91 FL (ref 80–100)
MONOCYTES ABSOLUTE: 0.5 K/UL (ref 0–1.3)
MONOCYTES RELATIVE PERCENT: 6.6 %
NEUTROPHILS ABSOLUTE: 3.9 K/UL (ref 1.7–7.7)
NEUTROPHILS RELATIVE PERCENT: 55 %
PDW BLD-RTO: 14.3 % (ref 12.4–15.4)
PLATELET # BLD: 166 K/UL (ref 135–450)
PMV BLD AUTO: 8.2 FL (ref 5–10.5)
POTASSIUM SERPL-SCNC: 4.6 MMOL/L (ref 3.5–5.1)
RBC # BLD: 4.84 M/UL (ref 4–5.2)
SODIUM BLD-SCNC: 140 MMOL/L (ref 136–145)
TOTAL PROTEIN: 6.5 G/DL (ref 6.4–8.2)
WBC # BLD: 7.2 K/UL (ref 4–11)

## 2022-06-30 PROCEDURE — G8420 CALC BMI NORM PARAMETERS: HCPCS | Performed by: FAMILY MEDICINE

## 2022-06-30 PROCEDURE — 4004F PT TOBACCO SCREEN RCVD TLK: CPT | Performed by: FAMILY MEDICINE

## 2022-06-30 PROCEDURE — 36415 COLL VENOUS BLD VENIPUNCTURE: CPT | Performed by: FAMILY MEDICINE

## 2022-06-30 PROCEDURE — G8399 PT W/DXA RESULTS DOCUMENT: HCPCS | Performed by: FAMILY MEDICINE

## 2022-06-30 PROCEDURE — 1090F PRES/ABSN URINE INCON ASSESS: CPT | Performed by: FAMILY MEDICINE

## 2022-06-30 PROCEDURE — G8427 DOCREV CUR MEDS BY ELIG CLIN: HCPCS | Performed by: FAMILY MEDICINE

## 2022-06-30 PROCEDURE — 1123F ACP DISCUSS/DSCN MKR DOCD: CPT | Performed by: FAMILY MEDICINE

## 2022-06-30 PROCEDURE — 3023F SPIROM DOC REV: CPT | Performed by: FAMILY MEDICINE

## 2022-06-30 PROCEDURE — 99214 OFFICE O/P EST MOD 30 MIN: CPT | Performed by: FAMILY MEDICINE

## 2022-06-30 RX ORDER — DICYCLOMINE HYDROCHLORIDE 10 MG/1
10 CAPSULE ORAL 4 TIMES DAILY PRN
Qty: 30 CAPSULE | Refills: 1 | Status: SHIPPED | OUTPATIENT
Start: 2022-06-30

## 2022-06-30 SDOH — ECONOMIC STABILITY: FOOD INSECURITY: WITHIN THE PAST 12 MONTHS, THE FOOD YOU BOUGHT JUST DIDN'T LAST AND YOU DIDN'T HAVE MONEY TO GET MORE.: NEVER TRUE

## 2022-06-30 SDOH — ECONOMIC STABILITY: TRANSPORTATION INSECURITY
IN THE PAST 12 MONTHS, HAS LACK OF TRANSPORTATION KEPT YOU FROM MEETINGS, WORK, OR FROM GETTING THINGS NEEDED FOR DAILY LIVING?: NO

## 2022-06-30 SDOH — ECONOMIC STABILITY: TRANSPORTATION INSECURITY
IN THE PAST 12 MONTHS, HAS THE LACK OF TRANSPORTATION KEPT YOU FROM MEDICAL APPOINTMENTS OR FROM GETTING MEDICATIONS?: NO

## 2022-06-30 SDOH — ECONOMIC STABILITY: FOOD INSECURITY: WITHIN THE PAST 12 MONTHS, YOU WORRIED THAT YOUR FOOD WOULD RUN OUT BEFORE YOU GOT MONEY TO BUY MORE.: NEVER TRUE

## 2022-06-30 ASSESSMENT — SOCIAL DETERMINANTS OF HEALTH (SDOH): HOW HARD IS IT FOR YOU TO PAY FOR THE VERY BASICS LIKE FOOD, HOUSING, MEDICAL CARE, AND HEATING?: NOT HARD AT ALL

## 2022-06-30 NOTE — PROGRESS NOTES
[facility]  Clinic Note    Date: 2022                                               Subjective:     Chief Complaint   Patient presents with    Anxiety     3 MO ANXIETY FOLLOW UP  UDS UTD     Pain     ON AND OFF STOMACH PAIN DOES NOT CAUSE NAUSEA OR DIARRHEA NO CONSTIPATION      HPI  Across mid abdomen - crampy - feels like has to have bm but doesn't usually have to go  Normal bm in am  No nausea  No fever/ chills -no exposure to anyone  Started 4 days ago - comes and goes  No appetite - eats because she has to - no change in pain w/ eating   Losing weight for awhile  Trying to eat more  No anxious -no depression - mood okay. Nocturia x2 normal for some time  Klonopin helps her get to and stay asleep - helps w/ mood at night  No problems - on this for some time.   Smokes 5-6 cigs/ day - doesn't inhaler much  Has family living w/ her    recently - doing okay - had alzheimers       Patient Active Problem List    Diagnosis Date Noted    Coronary artery calcification 2021    Aortic valve calcification 2021    Hiatal hernia 2021    Other emphysema ([address] 2021    Pulmonary nodule 2021    Unintentional weight loss 2021    Tobacco abuse 2021    Prediabetes 2021    Anxiety 2021    Insomnia 2021    History of total knee arthroplasty, right 2019    Arthritis of right knee 2019    Ankle arthritis 2018    Osteoarthritis 02/15/2018    Renal insufficiency 2018    Restless leg syndrome 2017    Senile osteomalacia  2017    Spinal stenosis of lumbar region 2017    Osteopenia 2017    Mixed hyperlipidemia 10/08/2007     Past Medical History:   Diagnosis Date    Aortic valve calcification 2021    Arthritis     Coronary artery calcification 2021    Diverticulitis     Hiatal hernia 2021    small    History of nonmelanoma skin cancer 2016    Hyperlipidemia     Osteoarthritis     Other emphysema [address] 2021    mild    Pulmonary nodule 2021    right 6 mm    Restless legs syndrome     Stage 3a chronic kidney disease ([facility] 2022    Wrist fracture, left 2017    TRIPPED AT SON'S HOUSE     Past Surgical History:   Procedure Laterality Date    CHOLECYSTECTOMY  1996    COLON SURGERY      DIVERTICULITIS, REMOVED DIVERTICULA POCKETS    SKIN CANCER EXCISION Left 2019    face    TOTAL KNEE ARTHROPLASTY Right 2019    RIGHT TOTAL KNEE REPLACEMENT performed by Martin Mann MD at [place] [id number] Visit on 2022   Component Date Value Ref Range Status    Hemoglobin A1C 2022 5.5  % Final     Family History   Problem Relation Age of Onset    Stroke Mother [de-identified]    Alzheimer's Disease Mother 68    Other Father 47        CHOKED AND FELL    Stroke Sister 80    Other Brother         BOTH  FROM ACCIDENTS    Stroke Sister 78     Current Outpatient Medications   Medication Sig Dispense Refill    atorvastatin (LIPITOR) 40 MG tablet TAKE 1 TABLET EVERY DAY 90 tablet 1    clonazePAM (KLONOPIN) 1 MG tablet FILL 3/5 TAKE ONE HALF TO ONE TABLET BY MOUTH NIGHTLY 30 tablet 2    celecoxib (CELEBREX) 200 MG capsule TAKE 1 CAPSULE EVERY DAY 90 capsule 2    Blood Glucose Monitoring Suppl KIT 1 each by Does not apply route daily Indications: Impaired Glucose Tolerance 1 kit 0    Lancets MISC 1 each by Does not apply route daily Dispense whatever brand insurance prefers 100 each 3    blood glucose monitor strips Test 2 times a day & as needed forirregular blood glucose. Dispense sufficient amount for indicated testing. Insurance preferred brand 100 strip 2    aspirin EC 81 MG EC tablet Take 1 tablet by mouth 2 times daily for 14 days Please avoid missing doses.  28 tablet 0    Multiple Vitamins-Minerals (CENTRUM SILVER PO) Take by mouth      vitamin B-12 (CYANOCOBALAMIN) 1000 MCG tablet Take 1,000 mcg by mouth daily  vitamin C (ASCORBIC ACID) 500 MG tablet Take 500 mg by mouth daily      vitamin D (CHOLECALCIFEROL) 1000 UNIT TABS tablet Take 1,000 Units by mouth daily       No current facility-administered medications for this visit. Allergies   Allergen Reactions    Naproxen Other (See Comments)     GI UPSET    Bactrim [Sulfamethoxazole-Trimethoprim]      Nausea/ abd pain       Review of Systems    Objective:  /72 (Site: Left Upper Arm, Position: Sitting, Cuff Size: Medium Adult)   Pulse 76   Ht 5' 3\" (1.6 m)   Wt 130 lb (59 kg)   LMP  (Exact Date)   SpO2 98%   Breastfeeding No   BMI 23.03 kg/m²     BP Readings from Last 3 Encounters:   06/30/22 124/72   02/24/22 130/82   12/29/21 130/78       Pulse Readings from Last 3 Encounters:   06/30/22 76   02/24/22 60   12/29/21 67       Wt Readings from Last 3 Encounters:   06/30/22 130 lb (59 kg)   02/24/22 140 lb 6.4 oz (63.7 kg)   10/22/21 132 lb (59.9 kg)   last a1c 5.5%    Physical Exam  Constitutional:       General: She is not in acute distress. Appearance: She is well-developed. HENT:      Head: Normocephalic and atraumatic. Mouth/Throat:      Pharynx: No oropharyngeal exudate. Eyes:      General: No scleral icterus. Conjunctiva/sclera: Conjunctivae normal.   Neck:      Thyroid: No thyromegaly. Cardiovascular:      Rate and Rhythm: Normal rate and regular rhythm. Heart sounds: Normal heart sounds. No murmur heard. Pulmonary:      Effort: Pulmonary effort is normal. No respiratory distress. Breath sounds: Normal breath sounds. No wheezing or rales. Abdominal:      General: Bowel sounds are normal. There is no distension. Palpations: Abdomen is soft. Tenderness: There is no abdominal tenderness (minimal ttp mid abdomen). Musculoskeletal:         General: No swelling. Lymphadenopathy:      Cervical: No cervical adenopathy. Skin:     General: Skin is warm and dry.    Neurological:      Mental Status: She is alert and oriented to person, place, and time. Assessment/Plan:      Diagnosis Orders   1. Abdominal pain, unspecified abdominal location     2. Prediabetes     3. Other emphysema ([address]     4. Mixed hyperlipidemia     5. Osteopenia, unspecified location     6. Arthritis     7. Weight loss     8.  Anxiety         Smoking 5-6 cigs/day - doesn't inhale much - just habit  Trying to reduce carbs - good water intake  Diet dw/ pt  Encourage glucerna daily for weight loss  Check labs today  Consider CT if worsening/ persistent sxs of abd  Bentyl prn 10mg - se d/ wpt  Monitor weight - recheck in office in next 3 months  Will call for refill klonopin when needed  oarrs reviewed and results c/w rx'd meds  Klonopin 1mg #30 filled 5/31 - usually every 6-8 weeks  Darien Waters MD, MD  6/30/2022  3:57 PM

## 2022-07-27 ENCOUNTER — TELEPHONE (OUTPATIENT)
Dept: CASE MANAGEMENT | Age: 82
End: 2022-07-27

## 2022-07-27 NOTE — TELEPHONE ENCOUNTER
Pt overdue for f/u 6 month Chest CT. Pt has an active order for the test. Called pt to assist with scheduling. She is concerned about the cost.  She is going to call her MD and discuss.

## 2022-07-29 ENCOUNTER — TELEPHONE (OUTPATIENT)
Dept: FAMILY MEDICINE CLINIC | Age: 82
End: 2022-07-29

## 2022-07-29 DIAGNOSIS — M17.11 PRIMARY OSTEOARTHRITIS OF RIGHT KNEE: ICD-10-CM

## 2022-07-29 RX ORDER — CELECOXIB 200 MG/1
CAPSULE ORAL
Qty: 90 CAPSULE | Refills: 1 | Status: SHIPPED | OUTPATIENT
Start: 2022-07-29

## 2022-09-22 DIAGNOSIS — G25.81 RESTLESS LEG SYNDROME: ICD-10-CM

## 2022-09-22 RX ORDER — CLONAZEPAM 1 MG/1
TABLET ORAL
Qty: 30 TABLET | Refills: 0 | Status: SHIPPED | OUTPATIENT
Start: 2022-09-22 | End: 2022-11-29 | Stop reason: SDUPTHER

## 2022-10-11 ENCOUNTER — OFFICE VISIT (OUTPATIENT)
Dept: FAMILY MEDICINE CLINIC | Age: 82
End: 2022-10-11
Payer: MEDICARE

## 2022-10-11 VITALS
HEART RATE: 60 BPM | BODY MASS INDEX: 23.92 KG/M2 | WEIGHT: 135 LBS | OXYGEN SATURATION: 97 % | HEIGHT: 63 IN | SYSTOLIC BLOOD PRESSURE: 118 MMHG | DIASTOLIC BLOOD PRESSURE: 76 MMHG

## 2022-10-11 DIAGNOSIS — G47.00 INSOMNIA, UNSPECIFIED TYPE: ICD-10-CM

## 2022-10-11 DIAGNOSIS — Z00.00 MEDICARE ANNUAL WELLNESS VISIT, SUBSEQUENT: Primary | ICD-10-CM

## 2022-10-11 DIAGNOSIS — M85.80 OSTEOPENIA, UNSPECIFIED LOCATION: ICD-10-CM

## 2022-10-11 DIAGNOSIS — M15.9 PRIMARY OSTEOARTHRITIS INVOLVING MULTIPLE JOINTS: ICD-10-CM

## 2022-10-11 DIAGNOSIS — F41.9 ANXIETY: ICD-10-CM

## 2022-10-11 DIAGNOSIS — G25.81 RESTLESS LEG SYNDROME: ICD-10-CM

## 2022-10-11 PROCEDURE — G0008 ADMIN INFLUENZA VIRUS VAC: HCPCS | Performed by: FAMILY MEDICINE

## 2022-10-11 PROCEDURE — G8484 FLU IMMUNIZE NO ADMIN: HCPCS | Performed by: FAMILY MEDICINE

## 2022-10-11 PROCEDURE — G0439 PPPS, SUBSEQ VISIT: HCPCS | Performed by: FAMILY MEDICINE

## 2022-10-11 PROCEDURE — 90694 VACC AIIV4 NO PRSRV 0.5ML IM: CPT | Performed by: FAMILY MEDICINE

## 2022-10-11 PROCEDURE — 1123F ACP DISCUSS/DSCN MKR DOCD: CPT | Performed by: FAMILY MEDICINE

## 2022-10-11 ASSESSMENT — LIFESTYLE VARIABLES
HOW MANY STANDARD DRINKS CONTAINING ALCOHOL DO YOU HAVE ON A TYPICAL DAY: PATIENT DOES NOT DRINK
HOW OFTEN DO YOU HAVE A DRINK CONTAINING ALCOHOL: NEVER

## 2022-10-11 ASSESSMENT — PATIENT HEALTH QUESTIONNAIRE - PHQ9
SUM OF ALL RESPONSES TO PHQ9 QUESTIONS 1 & 2: 0
SUM OF ALL RESPONSES TO PHQ QUESTIONS 1-9: 0
SUM OF ALL RESPONSES TO PHQ QUESTIONS 1-9: 0
1. LITTLE INTEREST OR PLEASURE IN DOING THINGS: 0
2. FEELING DOWN, DEPRESSED OR HOPELESS: 0
SUM OF ALL RESPONSES TO PHQ QUESTIONS 1-9: 0
SUM OF ALL RESPONSES TO PHQ QUESTIONS 1-9: 0

## 2022-10-11 NOTE — PROGRESS NOTES
Immunizations Administered       Name Date Dose Route    Influenza, FLUAD, (age 72 y+), Adjuvanted, 0.5mL 10/11/2022 0.5 mL Intramuscular    Site: Deltoid- Left    Lot: [id number]    NDC: 40543-650-18

## 2022-10-27 DIAGNOSIS — E78.2 MIXED HYPERLIPIDEMIA: ICD-10-CM

## 2022-10-27 RX ORDER — ATORVASTATIN CALCIUM 40 MG/1
TABLET, FILM COATED ORAL
Qty: 90 TABLET | Refills: 1 | Status: SHIPPED | OUTPATIENT
Start: 2022-10-27

## 2022-11-29 DIAGNOSIS — G25.81 RESTLESS LEG SYNDROME: ICD-10-CM

## 2022-11-29 RX ORDER — CLONAZEPAM 1 MG/1
TABLET ORAL
Qty: 30 TABLET | Refills: 1 | Status: SHIPPED | OUTPATIENT
Start: 2022-11-29 | End: 2022-12-28

## 2022-11-29 NOTE — TELEPHONE ENCOUNTER
Patient is calling because she needs a refill on her medication CLONAZEPAM 1 MG TABLET - 1/2 TABLET PER DAY    Martin Erp - [phone] - PHONE [address] [facility].

## 2022-12-17 NOTE — PROGRESS NOTES
Covid booster/ flu shot encouraged  Cbc, cmp reviewed from 6/30/22 - uds okay 10/22/21 - needs repeat  oarrs reviewed and results c/w rx'd meds  Clonazepam 1mg #30 - lasting about 6-8 weeksMedicare Annual Wellness Visit    Madeleine Null is here for Medicare AWV (MEDICARE AWV)    Assessment & Plan    Diagnosis Orders   1. Medicare annual wellness visit, subsequent        2. Osteopenia, unspecified location        3. Restless leg syndrome        4. Primary osteoarthritis involving multiple joints        5. Anxiety        6. Insomnia, unspecified type          Vitamins regularly  Diet/ activity d/w pt  Flu shot today  Covid booster #2 soon  Generally healthy  Recent labs reviewed - doing well  Cont lipitor  Celebrex 200/d w/ food  Bentyl prn  F/u 3 months routinely - check fasting labs then  Feet care for possible neuropathy left foot   Recommendations for Preventive Services Due: see orders and patient instructions/AVS.  Recommended screening schedule for the next 5-10 years is provided to the patient in written form: see Patient Instructions/AVS.          Subjective     PREVAGEN taken for 1/2 year -not sure if helping much  Memory seems fairly good, but some forgetfulness noted  Good balanced diet - stays active regularly  Sleep good overall. Reads a lot - has good social supports. Taking 1/2 klonopin 1mg at night - total 0.5mg at night helps rls - taken for a long time. Celebrex tolerated okay and working for [email or web address] pain  Works outside - smokes about 5 cigs/ day - doesn't inhale. Vision/ hearing stable - eye exam last week.   Watches sugars somewhat  On supplements  Some nocturia  No swelling in ankles  Tingling in left foot    BP Readings from Last 3 Encounters:   10/11/22 118/76   06/30/22 124/72   02/24/22 130/82     Pulse Readings from Last 3 Encounters:   10/11/22 60   06/30/22 76   02/24/22 60     Wt Readings from Last 3 Encounters:   10/11/22 135 lb (61.2 kg)   06/30/22 130 lb (59 kg)   02/24/22 140 lb 6.4 oz (63.7 kg)       Patient's complete Health Risk Assessment and screening values have been reviewed and are found in Flowsheets. The following problems were reviewed today and where indicated follow up appointments were made and/or referrals ordered. Positive Risk Factor Screenings with Interventions:       Tobacco Use:  Tobacco Use: High Risk    Smoking Tobacco Use: Some Days    Smokeless Tobacco Use: Never     E-cigarette/Vaping       Questions Responses    E-cigarette/Vaping Use Never User    Start Date     Passive Exposure     Quit Date     Counseling Given     Comments           Substance Use - Tobacco Interventions:  Tobacco use d/w pt         General Health and ACP:  General  In general, how would you say your health is?: Very Good  In the past 7 days, have you experienced any of the following: New or Increased Pain, New or Increased Fatigue, Loneliness, Social Isolation, Stress or Anger?: No  Do you get the social and emotional support that you need?: Yes  Do you have a Living Will?: Yes    Advance Directives       Power of  Living Will ACP-Advance Directive ACP-Power of     Not on File Not on File Not on File Not on File        General Health Risk Interventions:  Get dpoa/ lw on file              Objective   Vitals:    10/11/22 1425   BP: 118/76   Site: Left Upper Arm   Position: Sitting   Cuff Size: Medium Adult   Pulse: 60   SpO2: 97%   Weight: 135 lb (61.2 kg)   Height: 5' 3\" (1.6 m)      Body mass index is 23.91 kg/m². Allergies   Allergen Reactions    Naproxen Other (See Comments)     GI UPSET    Bactrim [Sulfamethoxazole-Trimethoprim]      Nausea/ abd pain     Prior to Visit Medications    Medication Sig Taking?  Authorizing Provider   clonazePAM (KLONOPIN) 1 MG tablet TAKE 1/2 TO 1 TABLET BY MOUTH ONCE NIGHTLY Yes Leda Reeder MD   celecoxib (CELEBREX) 200 MG capsule TAKE 1 CAPSULE EVERY DAY Yes Luke A Glischinski, APRN - CNP   dicyclomine (BENTYL) 10 MG capsule Take 1 capsule by mouth 4 times daily as needed (abdominal pain/ cramping) Yes Adele Richmond MD   atorvastatin (LIPITOR) 40 MG tablet TAKE 1 TABLET EVERY DAY Yes MICAELA Martinez CNP   Blood Glucose Monitoring Suppl KIT 1 each by Does not apply route daily Indications: Impaired Glucose Tolerance Yes Adele Richmond MD   Lancets MISC 1 each by Does not apply route daily Dispense whatever brand insurance prefers Yes Adele Richmond MD   blood glucose monitor strips Test 2 times a day & as needed forirregular blood glucose. Dispense sufficient amount for indicated testing. Insurance preferred brand Yes Adele Richmond MD   Multiple Vitamins-Minerals (CENTRUM SILVER PO) Take by mouth Yes Historical Provider, MD   vitamin B-12 (CYANOCOBALAMIN) 1000 MCG tablet Take 1,000 mcg by mouth daily Yes Historical Provider, MD   vitamin C (ASCORBIC ACID) 500 MG tablet Take 500 mg by mouth daily Yes Historical Provider, MD   vitamin D (CHOLECALCIFEROL) 1000 UNIT TABS tablet Take 1,000 Units by mouth daily Yes Historical Provider, MD   aspirin EC 81 MG EC tablet Take 1 tablet by mouth 2 times daily for 14 days Please avoid missing doses.   MICAELA Mcclain CNP       CareTeam (Including outside providers/suppliers regularly involved in providing care):   Patient Care Team:  Adele Richmond MD as PCP - General (Family Medicine)  Adele Richmond MD as PCP - [facility]  Shasha De Los Santos RN as Nurse Navigator     Reviewed and updated this visit:  Allergies  Meds No

## 2023-01-24 DIAGNOSIS — M17.11 PRIMARY OSTEOARTHRITIS OF RIGHT KNEE: ICD-10-CM

## 2023-01-24 RX ORDER — CELECOXIB 200 MG/1
CAPSULE ORAL
Qty: 90 CAPSULE | Refills: 0 | Status: SHIPPED | OUTPATIENT
Start: 2023-01-24

## 2023-03-14 ENCOUNTER — OFFICE VISIT (OUTPATIENT)
Dept: FAMILY MEDICINE CLINIC | Age: 83
End: 2023-03-14
Payer: MEDICARE

## 2023-03-14 VITALS
HEIGHT: 63 IN | SYSTOLIC BLOOD PRESSURE: 126 MMHG | BODY MASS INDEX: 24.27 KG/M2 | HEART RATE: 74 BPM | DIASTOLIC BLOOD PRESSURE: 72 MMHG | WEIGHT: 137 LBS | OXYGEN SATURATION: 98 %

## 2023-03-14 DIAGNOSIS — G25.81 RESTLESS LEG SYNDROME: ICD-10-CM

## 2023-03-14 DIAGNOSIS — J43.8 OTHER EMPHYSEMA (HCC): ICD-10-CM

## 2023-03-14 DIAGNOSIS — M85.80 OSTEOPENIA, UNSPECIFIED LOCATION: ICD-10-CM

## 2023-03-14 DIAGNOSIS — N18.31 CHRONIC RENAL IMPAIRMENT, STAGE 3A (HCC): ICD-10-CM

## 2023-03-14 DIAGNOSIS — R73.9 HYPERGLYCEMIA: ICD-10-CM

## 2023-03-14 DIAGNOSIS — M15.9 PRIMARY OSTEOARTHRITIS INVOLVING MULTIPLE JOINTS: ICD-10-CM

## 2023-03-14 DIAGNOSIS — G25.81 RLS (RESTLESS LEGS SYNDROME): Primary | ICD-10-CM

## 2023-03-14 DIAGNOSIS — E78.2 MIXED HYPERLIPIDEMIA: ICD-10-CM

## 2023-03-14 DIAGNOSIS — Z79.899 LONG TERM USE OF DRUG: ICD-10-CM

## 2023-03-14 PROBLEM — F41.9 ANXIETY: Status: RESOLVED | Noted: 2021-03-01 | Resolved: 2023-03-14

## 2023-03-14 LAB
ALCOHOL URINE: NORMAL
AMPHETAMINE SCREEN, URINE: NORMAL
BARBITURATE SCREEN, URINE: NORMAL
BENZODIAZEPINE SCREEN, URINE: NORMAL
BUPRENORPHINE URINE: NORMAL
COCAINE METABOLITE SCREEN URINE: NORMAL
FENTANYL SCREEN, URINE: NORMAL
GABAPENTIN SCREEN, URINE: NORMAL
HBA1C MFR BLD: 5.3 %
MDMA URINE: NORMAL
METHADONE SCREEN, URINE: NORMAL
METHAMPHETAMINE, URINE: NORMAL
OPIATE SCREEN URINE: NORMAL
OXYCODONE SCREEN URINE: NORMAL
PHENCYCLIDINE SCREEN URINE: NORMAL
PROPOXYPHENE SCREEN, URINE: NORMAL
SYNTHETIC CANNABINOIDS(K2) SCREEN, URINE: NORMAL
THC SCREEN, URINE: NORMAL
TRAMADOL SCREEN URINE: NORMAL
TRICYCLIC ANTIDEPRESSANTS, UR: NORMAL

## 2023-03-14 PROCEDURE — G8399 PT W/DXA RESULTS DOCUMENT: HCPCS | Performed by: FAMILY MEDICINE

## 2023-03-14 PROCEDURE — 83036 HEMOGLOBIN GLYCOSYLATED A1C: CPT | Performed by: FAMILY MEDICINE

## 2023-03-14 PROCEDURE — 3023F SPIROM DOC REV: CPT | Performed by: FAMILY MEDICINE

## 2023-03-14 PROCEDURE — G8484 FLU IMMUNIZE NO ADMIN: HCPCS | Performed by: FAMILY MEDICINE

## 2023-03-14 PROCEDURE — 1123F ACP DISCUSS/DSCN MKR DOCD: CPT | Performed by: FAMILY MEDICINE

## 2023-03-14 PROCEDURE — 80305 DRUG TEST PRSMV DIR OPT OBS: CPT | Performed by: FAMILY MEDICINE

## 2023-03-14 PROCEDURE — 99214 OFFICE O/P EST MOD 30 MIN: CPT | Performed by: FAMILY MEDICINE

## 2023-03-14 PROCEDURE — 1090F PRES/ABSN URINE INCON ASSESS: CPT | Performed by: FAMILY MEDICINE

## 2023-03-14 PROCEDURE — 4004F PT TOBACCO SCREEN RCVD TLK: CPT | Performed by: FAMILY MEDICINE

## 2023-03-14 PROCEDURE — G8420 CALC BMI NORM PARAMETERS: HCPCS | Performed by: FAMILY MEDICINE

## 2023-03-14 PROCEDURE — G8427 DOCREV CUR MEDS BY ELIG CLIN: HCPCS | Performed by: FAMILY MEDICINE

## 2023-03-14 RX ORDER — ATORVASTATIN CALCIUM 40 MG/1
TABLET, FILM COATED ORAL
Qty: 90 TABLET | Refills: 1 | Status: SHIPPED | OUTPATIENT
Start: 2023-03-14

## 2023-03-14 RX ORDER — CLONAZEPAM 1 MG/1
TABLET ORAL
Qty: 30 TABLET | Refills: 2 | Status: SHIPPED | OUTPATIENT
Start: 2023-03-25 | End: 2023-06-27

## 2023-03-14 NOTE — PATIENT INSTRUCTIONS
GENERAL OFFICE POLICIES      Telephone Calls: Messages will be answered within 1-2 business days, unless the provider is out of the office. If it is urgent a covering provider will answer. (this does not include Medication refills). MyChart: We recommend all patients sign up for [employer]. Through this portal you can see your lab results, request refills, schedule appointments, pay your bill and send messages to the office. [employer] messages will be answered within 1-2 business days unless the provider is out of the office. For urgent matters, please call the office. Appointments:  All appointments must be scheduled. We ask all patients to schedule their next follow up appointment before they leave the office to make sure you will be able to be seen before you run out of medications. 24 hours notice is required to cancel or reschedule an appointment to avoid being marked as a no show. You may be dismissed from the practice after 3 no shows. LATE for Appointment: If you are 15 or more minutes late for your appointment, you may be asked to reschedule. MA/LAB APPTS: Must be scheduled, cannot accept walk in lab visits. We only draw labs for patients established in our office. We only do injections for medications ordered by our office. Acute Sick Visits:  Nothing other than acute complaint will be addressed at this visit. TRADITIONAL MEDICARE  DOES NOT COVER PHYSICALS  MEDICARE WELLNESS VISITS: These are NOT physicals but the free annual visit offered by Medicare to discuss wellness issues. Medication refills, checkups, etc. will not be addressed during this visit. Medication Refills: Refills are handled electronically so please contact your pharmacy for medication refills even if current refills have been exhausted. If you are on a controlled medication you will be referred to a specialist (pain specialist, psychiatry, etc). Forms:  There is a $35 fee to fill out FMLA/Disability paperwork, payable at time of . Instead of the fee, you can choose to have the paperwork filled out during a separate office visit that is for filling out the paperwork only. Medication Samples: This office does not carry medication samples. If you need assistance in getting your medications, then please let the medical assistant know so they can help you sign up for a drug assistance program that can help get medications at a reduced cost or even free (if you qualify). Workman's Comp Claims: We do not handle workman's comp cases or claims. You will need to go to an urgent care to be seen or to whomever your employer uses. General - Any abusive/rude behavior toward staff/providers may be cause for dismissal.  Marlene Turner may receive a survey regarding the care you received during your visit. Your input is valuable to us. We encourage you to complete and return your survey. We hope you will choose us in the future for your healthcare needs.

## 2023-03-14 NOTE — PROGRESS NOTES
[facility]  Clinic Note    Date: 3/14/2023                                               Subjective:     Chief Complaint   Patient presents with    Anxiety     ANXIETY ROUTINE FOLLOW UP      HPI  Weight up - not as active in winter - gets up and does things through day - doesn't sit around  Pain in left shin - uses voltaren gel to hands but tried on leg and didn't help - nki - no swelling/ inflammation - placed old sock of 's and heat seemed to help. Celebrex daily - tolerates well and keeps oa pain more manageable. Hands/ right shoulder mainly - had right tka - left knee seems okay -hips okay. Good balanced diet  Hydrates well through day  -drinks fruit drink  Klonopin 1/2 tab, ca/ asa prior to bed. For restless legs -klonopin working well. On same dose for nearly 20 years. On prevagen for memory -no recent change in memory  Up through night 1x to urinate - able to get back to sleep okay  Feels steady on feet.   No mood problems  Seen by ent for wax removal - left side worse  BP Readings from Last 3 Encounters:   03/14/23 126/72   10/11/22 118/76   06/30/22 124/72     Pulse Readings from Last 3 Encounters:   03/14/23 74   10/11/22 60   06/30/22 76     Wt Readings from Last 3 Encounters:   03/14/23 137 lb (62.1 kg)   10/11/22 135 lb (61.2 kg)   06/30/22 130 lb (59 kg)            Patient Active Problem List    Diagnosis Date Noted    Coronary artery calcification 08/17/2021    Aortic valve calcification 08/17/2021    Hiatal hernia 08/17/2021    Other emphysema ([address] 08/17/2021    Pulmonary nodule 08/17/2021    Unintentional weight loss 07/30/2021    Tobacco abuse 07/30/2021    Prediabetes 03/01/2021    Anxiety 03/01/2021    Insomnia 03/01/2021    History of total knee arthroplasty, right 04/08/2019    Arthritis of right knee 04/01/2019    Ankle arthritis 05/12/2018    Osteoarthritis 02/15/2018    Renal insufficiency 01/29/2018    Restless leg syndrome 11/01/2017    Senile osteomalacia 11/01/2017    Spinal stenosis of lumbar region 06/22/2017    Osteopenia 06/22/2017    Mixed hyperlipidemia 10/08/2007     Past Medical History:   Diagnosis Date    Aortic valve calcification 08/17/2021    Arthritis     Coronary artery calcification 08/17/2021    Diverticulitis     Hiatal hernia 08/17/2021    small    History of nonmelanoma skin cancer 08/03/2016    Hyperlipidemia     Osteoarthritis     Other emphysema ([facility] [address] 08/17/2021    mild    Pulmonary nodule 08/17/2021    right 6 mm    Restless legs syndrome     Stage 3a chronic kidney disease ([facility] [address] 2/24/2022    Wrist fracture, left 06/2017    TRIPPED AT SON'S HOUSE     Past Surgical History:   Procedure Laterality Date    CHOLECYSTECTOMY  1996    COLON SURGERY  2007    DIVERTICULITIS, REMOVED DIVERTICULA POCKETS    SKIN CANCER EXCISION Left 03/18/2019    face    TOTAL KNEE ARTHROPLASTY Right 4/1/2019    RIGHT TOTAL KNEE REPLACEMENT performed by Crayl Bateman MD at [place] [address] Visit on 06/30/2022   Component Date Value Ref Range Status    Lipase 06/30/2022 47.0  13.0 - 60.0 U/L Final    Sodium 06/30/2022 140  136 - 145 mmol/L Final    Potassium 06/30/2022 4.6  3.5 - 5.1 mmol/L Final    Chloride 06/30/2022 100  99 - 110 mmol/L Final    CO2 06/30/2022 27  21 - 32 mmol/L Final    Anion Gap 06/30/2022 13  3 - 16 Final    Glucose 06/30/2022 93  70 - 99 mg/dL Final    BUN 06/30/2022 26 (A)  7 - 20 mg/dL Final    Creatinine 06/30/2022 1.0  0.6 - 1.2 mg/dL Final    GFR Non- 06/30/2022 53 (A)  >60 Final    Comment: >60 mL/min/1.73m2 EGFR, calc. for ages 25 and older using the  MDRD formula (not corrected for weight), is valid for stable  renal function. GFR  06/30/2022 >60  >60 Final    Comment: Chronic Kidney Disease: less than 60 ml/min/1.73 sq.m. Kidney Failure: less than 15 ml/min/1.73 sq.m. Results valid for patients 18 years and older.       Calcium 06/30/2022 9.7  8.3 - 10.6 mg/dL Final    Total Protein 2022 6.5  6.4 - 8.2 g/dL Final    Albumin 2022 4.2  3.4 - 5.0 g/dL Final    Albumin/Globulin Ratio 2022 1.8  1.1 - 2.2 Final    Total Bilirubin 2022 0.4  0.0 - 1.0 mg/dL Final    Alkaline Phosphatase 2022 66  40 - 129 U/L Final    ALT 2022 14  10 - 40 U/L Final    AST 2022 28  15 - 37 U/L Final    WBC 2022 7.2  4.0 - 11.0 K/uL Final    RBC 2022 4.84  4.00 - 5.20 M/uL Final    Hemoglobin 2022 15.0  12.0 - 16.0 g/dL Final    Hematocrit 2022 44.1  36.0 - 48.0 % Final    MCV 2022 91.0  80.0 - 100.0 fL Final    MCH 2022 31.1  26.0 - 34.0 pg Final    MCHC 2022 34.1  31.0 - 36.0 g/dL Final    RDW 2022 14.3  12.4 - 15.4 % Final    Platelets  166  135 - 450 K/uL Final    MPV 2022 8.2  5.0 - 10.5 fL Final    Neutrophils % 2022 55.0  % Final    Lymphocytes % 2022 36.1  % Final    Monocytes % 2022 6.6  % Final    Eosinophils % 2022 1.6  % Final    Basophils % 2022 0.7  % Final    Neutrophils Absolute 2022 3.9  1.7 - 7.7 K/uL Final    Lymphocytes Absolute 2022 2.6  1.0 - 5.1 K/uL Final    Monocytes Absolute 2022 0.5  0.0 - 1.3 K/uL Final    Eosinophils Absolute 2022 0.1  0.0 - 0.6 K/uL Final    Basophils Absolute 2022 0.0  0.0 - 0.2 K/uL Final     Family History   Problem Relation Age of Onset    Stroke Mother [de-identified]    Alzheimer's Disease Mother 68    Other Father 47        CHOKED AND FELL    Stroke Sister 80    Other Brother         BOTH  FROM ACCIDENTS    Stroke Sister 78     Current Outpatient Medications   Medication Sig Dispense Refill    celecoxib (CELEBREX) 200 MG capsule TAKE 1 CAPSULE EVERY DAY 90 capsule 0    clonazePAM (KLONOPIN) 1 MG tablet TAKE 1/2 TO 1 TABLET BY MOUTH ONCE NIGHTLY 30 tablet 1    atorvastatin (LIPITOR) 40 MG tablet TAKE 1 TABLET EVERY DAY 90 tablet 1    dicyclomine (BENTYL) 10 MG capsule Take 1 capsule by mouth 4 times daily as needed (abdominal pain/ cramping) 30 capsule 1    Blood Glucose Monitoring Suppl KIT 1 each by Does not apply route daily Indications: Impaired Glucose Tolerance 1 kit 0    Lancets MISC 1 each by Does not apply route daily Dispense whatever brand insurance prefers 100 each 3    blood glucose monitor strips Test 2 times a day & as needed forirregular blood glucose. Dispense sufficient amount for indicated testing. Insurance preferred brand 100 strip 2    Multiple Vitamins-Minerals (CENTRUM SILVER PO) Take by mouth      vitamin B-12 (CYANOCOBALAMIN) 1000 MCG tablet Take 1,000 mcg by mouth daily      vitamin C (ASCORBIC ACID) 500 MG tablet Take 500 mg by mouth daily      vitamin D (CHOLECALCIFEROL) 1000 UNIT TABS tablet Take 1,000 Units by mouth daily      aspirin EC 81 MG EC tablet Take 1 tablet by mouth 2 times daily for 14 days Please avoid missing doses. 28 tablet 0     No current facility-administered medications for this visit.     Allergies   Allergen Reactions    Naproxen Other (See Comments)     GI UPSET    Bactrim [Sulfamethoxazole-Trimethoprim]      Nausea/ abd pain       Review of Systems    Objective:  /72 (Site: Left Upper Arm, Position: Sitting, Cuff Size: Medium Adult)   Pulse 74   Ht 5' 3\" (1.6 m)   Wt 137 lb (62.1 kg)   SpO2 98%   BMI 24.27 kg/m²     BP Readings from Last 3 Encounters:   03/14/23 126/72   10/11/22 118/76   06/30/22 124/72       Pulse Readings from Last 3 Encounters:   03/14/23 74   10/11/22 60   06/30/22 76       Wt Readings from Last 3 Encounters:   03/14/23 137 lb (62.1 kg)   10/11/22 135 lb (61.2 kg)   06/30/22 130 lb (59 kg)       Physical Exam  Constitutional:       General: She is not in acute distress.     Appearance: She is well-developed.   HENT:      Head: Normocephalic and atraumatic.      Ears:      Comments: Moderate to large wax on left, mild to mod on right - tm looks clear bilat     Mouth/Throat:      Pharynx: No oropharyngeal exudate.   Eyes:      General: No  scleral icterus. Conjunctiva/sclera: Conjunctivae normal.   Neck:      Thyroid: No thyromegaly. Cardiovascular:      Rate and Rhythm: Normal rate and regular rhythm. Heart sounds: Normal heart sounds. No murmur heard. Pulmonary:      Effort: Pulmonary effort is normal. No respiratory distress. Breath sounds: Normal breath sounds. No wheezing or rales. Abdominal:      General: Bowel sounds are normal. There is no distension. Palpations: Abdomen is soft. Tenderness: There is no abdominal tenderness. Lymphadenopathy:      Cervical: No cervical adenopathy. Skin:     General: Skin is warm and dry. Neurological:      Mental Status: She is alert and oriented to person, place, and time. Assessment/Plan:     RLS (restless legs syndrome)  Primary osteoarthritis involving multiple joints  Osteopenia, unspecified location  -     Vitamin D 25 Hydroxy; Future  Mixed hyperlipidemia  -     Lipid Panel; Future  -     Comprehensive Metabolic Panel; Future  Long term use of drug  -     POCT Rapid Drug Screen  Other emphysema ([facility] [address]  Chronic renal impairment, stage 3a (HCC)  -     Comprehensive Metabolic Panel;  Future   Irrigate wax from left ear  Declines dexa -on vitamin d supplement  Celebrex 200/d w/ food - gi precautions dw/ pt  Bentyl prn  Lipitor 40mg for HLD  Lipid okay 10/21  Labs 6/22 reviewed w/ gfr 53  Mood good - klonopin 0.5mg hs for RLS -working well / stable for some time  No concerns - risk of falls/memory effect d/w pt  Prevagen supplement dw/ pt  oarrs reviewed and results c/w rx'd meds  Klonopin 1mg #30 filled 1/24/23 - usually #30 lasts couple months  Uds/ med agreement today  Fu 6 months/ prn  Chinedu Bhakta MD, MD  3/14/2023  3:35 PM

## 2023-03-14 NOTE — LETTER
CONTROLLED SUBSTANCE MEDICATION AGREEMENT     Patient Name: Cassie Sheppard  Patient YOB: 1940   I understand, that controlled substance medications may be used to help better manage my symptoms and to improve my ability to function at home, work and in social settings. However, I also understand that these medications do have risks, which have been discussed with me, including possible development of physical or psychological dependence. I understand that successful treatment requires mutual trust and honesty between me and my provider. I understand and agree that following this Medication Agreement is necessary in continuing my provider-patient relationship and the success of my treatment plan. Explanation from my Provider: Benefits and Goals of Controlled Substance Medications: There are two potential goals for your treatment: (1) decreased pain and suffering (2) improved daily life functions. There are many possible treatments for your chronic condition(s). Alternatives such as physical therapy, yoga, massage, home daily exercise, meditation, relaxation techniques, injections, chiropractic manipulations, surgery, cognitive therapy, hypnosis and many medications that are not habit-forming may be used. Use of controlled substance medications may be helpful, but they are unlikely to resolve all symptoms or restore all function. Explanation from my Provider: Risks of Controlled Substance Medications:  Opioid pain medications: These medications can lead to problems such as addiction/dependence, sedation, lightheadedness/dizziness, memory issues, falls, constipation, nausea, or vomiting. They may also impair the ability to drive or operate machinery. Additionally, these medications may lower testosterone levels, leading to loss of bone strength, stamina and sex drive.   They may cause problems with breathing, sleep apnea and reduced coughing, which is especially dangerous for patients with lung disease. Overdose or dangerous interactions with alcohol and other medications may occur, leading to death. Hyperalgesia may develop, which means patients receiving opioids for the treatment of pain may become more sensitive to certain painful stimuli, and in some cases, experience pain from ordinarily non-painful stimuli. Women between the ages of 14-53 who could become pregnant should carefully weigh the risks and benefits of opioids with their physicians, as these medications increase the risk of pregnancy complications, including miscarriage,  delivery and stillbirth. It is also possible for babies to be born addicted to opioids. Opioid dependence withdrawal symptoms may include; feelings of uneasiness, increased pain, irritability, belly pain, diarrhea, sweats and goose-flesh. Benzodiazepines and non-benzodiazepine sleep medications: These medications can lead to problems such as addiction/dependence, sedation, fatigue, lightheadedness, dizziness, incoordination, falls, depression, hallucinations, and impaired judgment, memory and concentration. The ability to drive and operate machinery may also be affected. Abnormal sleep-related behaviors have been reported, including sleepwalking, driving, making telephone calls, eating, or having sex while not fully awake. These medications can suppress breathing and worsen sleep apnea, particularly when combined with alcohol or other sedating medications, potentially leading to death. Dependence withdrawal symptoms may include tremors, anxiety, hallucinations and seizures. Stimulants:  Common adverse effects include addiction/dependence, increased blood  pressure and heart rate, decreased appetite, nausea, involuntary weight loss, insomnia,                                                                                                                     Initials:_______   irritability, and headaches.   These risks may increase when these medications are combined with other stimulants, such as caffeine pills or energy drinks, certain weight loss supplements and oral decongestants. Dependence withdrawal symptoms may include depressed mood, loss of interest, suicidal thoughts, anxiety, fatigue, appetite changes and agitation. Testosterone replacement therapy:  Potential side effects include increased risk of stroke and heart attack, blood clots, increased blood pressure, increased cholesterol, enlarged prostate, sleep apnea, irritability/aggression and other mood disorders, and decreased fertility. I agree and understand that I and my prescriber have the following rights and responsibilities regarding my treatment plan:     1. MY RIGHTS:  To be informed of my treatment and medication plan. To be an active participant in my health and wellbeing. 2. MY RESPONSIBILITY AND UNDERSTANDING FOR USE OF MEDICATIONS   I will take medications at the dose and frequency as directed. For my safety, I will not increase or change how I take my medications without the recommendation of my healthcare provider.  I will actively participate in any program recommended by my provider which may improve function, including social, physical, psychological programs.  I will not take my medications with alcohol or other drugs not prescribed to me. I understand that drinking alcohol with my medications increases the chances of side effects, including reduced breathing rate and could lead to personal injury when operating machinery.  I understand that if I have a history of substance use disorders, including alcohol or other illicit drugs, that I may be at increased risk of addiction to my medications.  I agree to notify my provider immediately if I should become pregnant so that my treatment plan can be adjusted.    I agree and understand that I shall only receive controlled substance medications from the prescriber that signed this agreement unless there is written agreement among other prescribers of controlled substances outlining the responsibility of the medications being prescribed.  I understand that the if the controlled medication is not helping to achieve goals, the dosage may be tapered and no longer prescribed. 3. MY RESPONSIBILITY FOR COMMUNICATION / PRESCRIPTION RENEWALS   I agree that all controlled substance medications that I take will be prescribed only by my provider. If another healthcare provider prescribes me medication in an emergency, I will notify my provider within seventy-two (72) hours.  I will arrange for refills at the prescribed interval ONLY during regular office hours. I will not ask for refills earlier than agreed, after-hours, on holidays or weekends. Refills may take up to 72 hours for processing and prescriptions to reach the pharmacy.  I will inform my other health care providers that I am taking these medications and of the existence of this [id number]. In the event of an emergency, I will provide the same information to the emergency department prescribers.  I will keep my provider updated on the pharmacy I am using for controlled medication prescription filling. Initials:_______  4. MY RESPONSIBILITY FOR PROTECTING MEDICATIONS   I will protect my prescriptions and medications. I understand that lost or misplaced prescriptions will not be replaced.  I will keep medications only for my own use and will not share them with others. I will keep all medications away from children.  I agree that if my medications are adjusted or discontinued, I will properly dispose of any remaining medications. I understand that I will be required to dispose of any remaining controlled medications as, directed by my prescriber, prior to being provided with any prescriptions for other controlled medications.   Medication drop box locations can be found at: [employer]    5. MY RESPONSIBILITY WITH ILLEGAL DRUGS    I will not use illegal or street drugs or another person's prescription medications not prescribed to me.  If there are identified addiction type symptoms, then referral to a program may be provided by my provider and I agree to follow through with this recommendation. 6. MY RESPONSIBILITY FOR COOPERATION WITH INVESTIGATIONS   I understand that my provider will comply with any applicable law and may discuss my use and/or possible misuse/abuse of controlled substances and alcohol, as appropriate, with any health care provider involved in my care, pharmacist, or legal authority.  I authorize my provider and pharmacy to cooperate fully with law enforcement agencies (as permitted by law) in the investigation of any possible misuse, sale, or other diversion of my controlled substances.  I agree to waive any applicable privilege or right of privacy or confidentiality with respect to these authorizations. 7. PROVIDERS RIGHT TO MONITOR FOR SAFETY: PRESCRIPTION MONITORING / DRUG TESTING   I consent to drug/toxicology screening and will submit to a drug screen upon my providers request to assure I am only taking the prescribed drugs for my safety monitoring. I understand that a drug screen is a laboratory test in which a sample of my urine, blood or saliva is checked to see what drugs I have been taking. This may entail an observed urine specimen, which means that a nurse or other health care provider may watch me provide urine, and I will cooperate if I am asked to provide an observed specimen.  I understand that my provider will check a copy of my State Prescription Monitoring Program () Report in order to safely prescribe medications.  Pill Counts: I consent to pill counts when requested.   I may be asked to bring all my prescribed controlled substance medications, in their original bottles, to all of my scheduled appointments. In addition, my provider may ask me to come to the practice at any time for a random pill count. 8. TERMINATION OF THIS AGREEMENT  For my safety, my prescriber has the right to stop prescribing controlled substance medications and may end this agreement. Initials:_______   Conditions that may result in termination of this agreement:  a. I do not show any improvement in pain, or my activity has not improved. b. I develop rapid tolerance or loss of improvement, as described in my treatment plan.  c. I develop significant side effects from the medication. d. My behavior is not consistent with the responsibilities outlined above, thereby causing safety concerns to continue prescribing controlled substance medications. e. I fail to follow the terms of this agreement. f. Other:____________________________       UNDERSTANDING THIS MEDICATION AGREEMENT:    I have read the above and have had all my questions answered. For chronic disease management, I know that my symptoms can be managed with many types of treatments. A chronic medication trial may be part of my treatment, but I must be an active participant in my care. Medication therapy is only one part of my symptom management plan. In some cases, there may be limited scientific evidence to support the chronic use of certain medications to improve symptoms and daily function. Furthermore, in certain circumstances, there may be scientific information that suggests that the use of chronic controlled substances may worsen my symptoms and increase my risk of unintentional death directly related to this medication therapy. I know that if my provider feels my risk from controlled medications is greater than my benefit, I will have my controlled substance medication(s) compassionately lowered or removed altogether.      I further agree to allow this office to contact my HIPAA contact if there are concerns about my safety and use of the controlled medications. I have agreed to use the prescribed controlled substance medications to me as instructed by my provider and as stated in this Medication Agreement. My initial on each page and my signature below shows that I have read each page and I have had the opportunity to ask questions with answers provided by my provider.     Patient Name (Printed): _____________________________________  Patient Signature:  ______________________   Date: _____________    Prescriber Name (Printed): ___________________________________  Prescriber Signature: _____________________  Date: _____________

## 2023-03-25 DIAGNOSIS — G25.81 RESTLESS LEG SYNDROME: ICD-10-CM

## 2023-03-27 RX ORDER — CLONAZEPAM 1 MG/1
TABLET ORAL
Qty: 30 TABLET | OUTPATIENT
Start: 2023-03-27

## 2023-03-27 NOTE — TELEPHONE ENCOUNTER
[facility] 3/14/23 WITH DR LANGFORD FOR ANXIETY [place] NONE  clonazePAM (KLONOPIN) 1 MG tablet 30 tablet 2 3/25/2023 6/27/2023    Sig: TAKE 1/2 TO 1 TABLET BY MOUTH ONCE NIGHTLY    Sent to pharmacy as: clonazePAM 1 MG Oral Tablet (Lathan Ganser)    Notes to Pharmacy: Please make appointment prior to further refills.     E-Prescribing Status: Receipt confirmed by pharmacy (3/14/2023  1:69 PM EDT)    DUPLICATE REQUEST Returned call to patient to let her know that the EKG has been faxed.       ----- Message from Yury Pelaez sent at 6/17/2020 11:26 AM CDT -----  Name of Who is Calling: REYES FERREIRA [[id number]]    What is the request in detail: Patient is requesting a that her EKG tracing be faxed over to Dr. Ratliff Fax#[phone].......................Please contact to further discuss and advise      Can the clinic reply by [facility]: No     What Number [facility]: [phone] (home) [phone] (work)

## 2023-04-04 ENCOUNTER — NURSE ONLY (OUTPATIENT)
Dept: FAMILY MEDICINE CLINIC | Age: 83
End: 2023-04-04
Payer: MEDICARE

## 2023-04-04 DIAGNOSIS — M85.80 OSTEOPENIA, UNSPECIFIED LOCATION: ICD-10-CM

## 2023-04-04 DIAGNOSIS — E78.2 MIXED HYPERLIPIDEMIA: ICD-10-CM

## 2023-04-04 DIAGNOSIS — N18.31 CHRONIC RENAL IMPAIRMENT, STAGE 3A (HCC): ICD-10-CM

## 2023-04-04 LAB
25(OH)D3 SERPL-MCNC: 90.7 NG/ML
ALBUMIN SERPL-MCNC: 4.2 G/DL (ref 3.4–5)
ALBUMIN/GLOB SERPL: 1.6 {RATIO} (ref 1.1–2.2)
ALP SERPL-CCNC: 71 U/L (ref 40–129)
ALT SERPL-CCNC: 22 U/L (ref 10–40)
ANION GAP SERPL CALCULATED.3IONS-SCNC: 14 MMOL/L (ref 3–16)
AST SERPL-CCNC: 32 U/L (ref 15–37)
BILIRUB SERPL-MCNC: 0.5 MG/DL (ref 0–1)
BUN SERPL-MCNC: 21 MG/DL (ref 7–20)
CALCIUM SERPL-MCNC: 9.4 MG/DL (ref 8.3–10.6)
CHLORIDE SERPL-SCNC: 104 MMOL/L (ref 99–110)
CHOLEST SERPL-MCNC: 140 MG/DL (ref 0–199)
CO2 SERPL-SCNC: 25 MMOL/L (ref 21–32)
CREAT SERPL-MCNC: 1 MG/DL (ref 0.6–1.2)
GFR SERPLBLD CREATININE-BSD FMLA CKD-EPI: 56 ML/MIN/{1.73_M2}
GLUCOSE SERPL-MCNC: 101 MG/DL (ref 70–99)
HDLC SERPL-MCNC: 58 MG/DL (ref 40–60)
LDLC SERPL CALC-MCNC: 58 MG/DL
POTASSIUM SERPL-SCNC: 4.5 MMOL/L (ref 3.5–5.1)
PROT SERPL-MCNC: 6.8 G/DL (ref 6.4–8.2)
SODIUM SERPL-SCNC: 143 MMOL/L (ref 136–145)
TRIGL SERPL-MCNC: 119 MG/DL (ref 0–150)
VLDLC SERPL CALC-MCNC: 24 MG/DL

## 2023-04-04 PROCEDURE — 36415 COLL VENOUS BLD VENIPUNCTURE: CPT | Performed by: FAMILY MEDICINE

## 2023-07-25 ENCOUNTER — OFFICE VISIT (OUTPATIENT)
Dept: ENT CLINIC | Age: 83
End: 2023-07-25
Payer: MEDICARE

## 2023-07-25 VITALS
TEMPERATURE: 97.3 F | DIASTOLIC BLOOD PRESSURE: 82 MMHG | HEART RATE: 64 BPM | OXYGEN SATURATION: 96 % | BODY MASS INDEX: 25.52 KG/M2 | WEIGHT: 144 LBS | HEIGHT: 63 IN | SYSTOLIC BLOOD PRESSURE: 145 MMHG

## 2023-07-25 DIAGNOSIS — H69.82 DYSFUNCTION OF LEFT EUSTACHIAN TUBE: Primary | ICD-10-CM

## 2023-07-25 DIAGNOSIS — H61.22 IMPACTED CERUMEN OF LEFT EAR: ICD-10-CM

## 2023-07-25 PROCEDURE — G8419 CALC BMI OUT NRM PARAM NOF/U: HCPCS | Performed by: STUDENT IN AN ORGANIZED HEALTH CARE EDUCATION/TRAINING PROGRAM

## 2023-07-25 PROCEDURE — 69210 REMOVE IMPACTED EAR WAX UNI: CPT | Performed by: STUDENT IN AN ORGANIZED HEALTH CARE EDUCATION/TRAINING PROGRAM

## 2023-07-25 PROCEDURE — 1123F ACP DISCUSS/DSCN MKR DOCD: CPT | Performed by: STUDENT IN AN ORGANIZED HEALTH CARE EDUCATION/TRAINING PROGRAM

## 2023-07-25 PROCEDURE — G8399 PT W/DXA RESULTS DOCUMENT: HCPCS | Performed by: STUDENT IN AN ORGANIZED HEALTH CARE EDUCATION/TRAINING PROGRAM

## 2023-07-25 PROCEDURE — 4004F PT TOBACCO SCREEN RCVD TLK: CPT | Performed by: STUDENT IN AN ORGANIZED HEALTH CARE EDUCATION/TRAINING PROGRAM

## 2023-07-25 PROCEDURE — 99203 OFFICE O/P NEW LOW 30 MIN: CPT | Performed by: STUDENT IN AN ORGANIZED HEALTH CARE EDUCATION/TRAINING PROGRAM

## 2023-07-25 PROCEDURE — 1090F PRES/ABSN URINE INCON ASSESS: CPT | Performed by: STUDENT IN AN ORGANIZED HEALTH CARE EDUCATION/TRAINING PROGRAM

## 2023-07-25 PROCEDURE — G8427 DOCREV CUR MEDS BY ELIG CLIN: HCPCS | Performed by: STUDENT IN AN ORGANIZED HEALTH CARE EDUCATION/TRAINING PROGRAM

## 2023-07-25 NOTE — PROGRESS NOTES
[address] NECK SURGERY  NEW PATIENT HISTORY AND PHYSICAL NOTE      Patient Name: [address] [place] [address] Record Number:  [id number]  Primary Care Physician:  Neil Colvin MD    ChiefComplaint     Chief Complaint   Patient presents with    Ear Problem     Lt ear cleaning,        History of Present Illness     Elena Sterling is an 79 y.o. female presenting with intermittent fullness in her left ear. She has had earwax issues in her left ear since age 15. Typically gets it cleaned out once a year. She went to urgent care 3 months ago for cleaning but seems like fullness has returned sooner than usual.  Does not use eardrops or Q-tips. She hears her self talk through her left ear a little bit differently. The symptoms occur usually once a year and related to earwax buildup typically. No tinnitus. No recent changes in hearing. No otalgia. No otorrhea. No history of chronic ear infections. No history of otologic surgery. No family history of early onset hearing loss. No loud noise exposures. No environmental or seasonal allergies. No nasal spray use.       Past Medical History     Past Medical History:   Diagnosis Date    Aortic valve calcification 08/17/2021    Arthritis     Coronary artery calcification 08/17/2021    Diverticulitis     Hiatal hernia 08/17/2021    small    History of nonmelanoma skin cancer 08/03/2016    Hyperlipidemia     Osteoarthritis     Other emphysema ([address]) 08/17/2021    mild    Pulmonary nodule 08/17/2021    right 6 mm    Restless legs syndrome     Stage 3a chronic kidney disease ([address]) 2/24/2022    Wrist fracture, left 06/2017    TRIPPED AT SON'S HOUSE       Past Surgical History     Past Surgical History:   Procedure Laterality Date    CHOLECYSTECTOMY  1996    COLON SURGERY  2007    DIVERTICULITIS, REMOVED DIVERTICULA POCKETS    SKIN CANCER EXCISION Left 03/18/2019    face    TOTAL KNEE ARTHROPLASTY Right 4/1/2019    RIGHT TOTAL KNEE

## 2023-08-17 ENCOUNTER — PROCEDURE VISIT (OUTPATIENT)
Dept: AUDIOLOGY | Age: 83
End: 2023-08-17
Payer: MEDICARE

## 2023-08-17 ENCOUNTER — OFFICE VISIT (OUTPATIENT)
Dept: ENT CLINIC | Age: 83
End: 2023-08-17
Payer: MEDICARE

## 2023-08-17 VITALS
BODY MASS INDEX: 26.5 KG/M2 | DIASTOLIC BLOOD PRESSURE: 82 MMHG | WEIGHT: 144 LBS | HEIGHT: 62 IN | TEMPERATURE: 96.9 F | SYSTOLIC BLOOD PRESSURE: 169 MMHG | HEART RATE: 53 BPM | OXYGEN SATURATION: 96 %

## 2023-08-17 DIAGNOSIS — H69.82 DYSFUNCTION OF LEFT EUSTACHIAN TUBE: Primary | ICD-10-CM

## 2023-08-17 DIAGNOSIS — H90.3 SENSORINEURAL HEARING LOSS (SNHL) OF BOTH EARS: ICD-10-CM

## 2023-08-17 DIAGNOSIS — H93.8X2 EAR PRESSURE, LEFT: ICD-10-CM

## 2023-08-17 DIAGNOSIS — H90.3 SENSORINEURAL HEARING LOSS, BILATERAL: Primary | ICD-10-CM

## 2023-08-17 PROCEDURE — G8419 CALC BMI OUT NRM PARAM NOF/U: HCPCS | Performed by: STUDENT IN AN ORGANIZED HEALTH CARE EDUCATION/TRAINING PROGRAM

## 2023-08-17 PROCEDURE — 99213 OFFICE O/P EST LOW 20 MIN: CPT | Performed by: STUDENT IN AN ORGANIZED HEALTH CARE EDUCATION/TRAINING PROGRAM

## 2023-08-17 PROCEDURE — 92567 TYMPANOMETRY: CPT | Performed by: AUDIOLOGIST

## 2023-08-17 PROCEDURE — G8427 DOCREV CUR MEDS BY ELIG CLIN: HCPCS | Performed by: STUDENT IN AN ORGANIZED HEALTH CARE EDUCATION/TRAINING PROGRAM

## 2023-08-17 PROCEDURE — G8399 PT W/DXA RESULTS DOCUMENT: HCPCS | Performed by: STUDENT IN AN ORGANIZED HEALTH CARE EDUCATION/TRAINING PROGRAM

## 2023-08-17 PROCEDURE — 92557 COMPREHENSIVE HEARING TEST: CPT | Performed by: AUDIOLOGIST

## 2023-08-17 PROCEDURE — 1123F ACP DISCUSS/DSCN MKR DOCD: CPT | Performed by: STUDENT IN AN ORGANIZED HEALTH CARE EDUCATION/TRAINING PROGRAM

## 2023-08-17 PROCEDURE — 1090F PRES/ABSN URINE INCON ASSESS: CPT | Performed by: STUDENT IN AN ORGANIZED HEALTH CARE EDUCATION/TRAINING PROGRAM

## 2023-08-17 PROCEDURE — 4004F PT TOBACCO SCREEN RCVD TLK: CPT | Performed by: STUDENT IN AN ORGANIZED HEALTH CARE EDUCATION/TRAINING PROGRAM

## 2023-08-17 RX ORDER — FLUTICASONE PROPIONATE 50 MCG
2 SPRAY, SUSPENSION (ML) NASAL DAILY
Qty: 48 G | Refills: 1 | Status: SHIPPED | OUTPATIENT
Start: 2023-08-17

## 2023-08-17 NOTE — PROGRESS NOTES
[facility] [address] [facility]      Patient Name: [address] [place] [address] Record Number:  [id number]  Primary Care Physician:  Елена Sears MD    ChiefComplaint     Chief Complaint   Patient presents with    Follow-up     Pt following up for ears/hearing loss. HT today. History of Present Illness     Keely Paulino is an 79 y.o. female previously seen for left eustachian tube dysfunction, impacted cerumen of left ear. Interval History:   Left ear will feel full intermittently. Occurs 3-4 times a week, will last 1-2 hours. No otalgia or otorrhea. No nasal spray use. Denies environmental allergies.      Past Medical History     Past Medical History:   Diagnosis Date    Aortic valve calcification 2021    Arthritis     Coronary artery calcification 2021    Diverticulitis     Hiatal hernia 2021    small    History of nonmelanoma skin cancer 2016    Hyperlipidemia     Osteoarthritis     Other emphysema ([address]) 2021    mild    Pulmonary nodule 2021    right 6 mm    Restless legs syndrome     Stage 3a chronic kidney disease ([address]) 2022    Wrist fracture, left 2017    TRIPPED AT SON'S HOUSE       Past Surgical History     Past Surgical History:   Procedure Laterality Date    CHOLECYSTECTOMY      COLON SURGERY      DIVERTICULITIS, REMOVED DIVERTICULA POCKETS    SKIN CANCER EXCISION Left 2019    face    TOTAL KNEE ARTHROPLASTY Right 2019    RIGHT TOTAL KNEE REPLACEMENT performed by Verenice Llamas MD at [address] [place] [address] History     Family History   Problem Relation Age of Onset    Stroke Mother 80    Alzheimer's Disease Mother 68    Other Father 47        CHOKED AND [address]    Stroke Sister 80    Other Brother         BOTH  FROM ACCIDENTS    Stroke Sister 78       Social History     Social History     Tobacco Use    Smoking status: Some Days     Packs/day: 0.25     Years: 50.00     Pack years:

## 2023-08-17 NOTE — PROGRESS NOTES
Keely Paulino   1940, 79 y.o. female   [id number]       Referring Provider: Heide Brown DO  Referral Type: In an order in [address] [place] [address]    Reason for Visit: Evaluation of suspected change in hearing, tinnitus, or balance. ADULT AUDIOLOGIC EVALUATION      Nguyen Darnell is a 79 y.o. female seen today, 8/17/2023, for an initial audiologic evaluation. AUDIOLOGIC AND OTHER PERTINENT MEDICAL HISTORY:        Keely Paulino noted known bilateral sensorineural hearing loss; history of left ear fullness, persisted following cerumen removal with Dr. Nba Gregory and is here for further evaluation, no change since her last appointment with him. Danay Paulino denied any changes since her last ENT appointment. IMPRESSIONS:       Today's results are consistent with bilateral sensorineural hearing loss, somewhat decreased from previous audiogram completed 2019; both ears with excellent word recognition and normal middle ear function, however, left TM mobility somewhat reduced compared to right. Hearing loss is significant enough to result in difficulty understanding speech in at least some listening environments. Follow medical recommendations from Dr. Nba Gregory. ASSESSMENT AND FINDINGS:       Otoscopy revealed: Clear ear canals bilaterally      RIGHT EAR:  Hearing Sensitivity: Mild through 2000 Hz sloping to severe sensorineural hearing loss. Speech Recognition Threshold: 35 dBHL  Word Recognition: Excellent (100%), based on NU-6 25-word list at 65 dBHL using recorded speech stimuli. Tympanometry: Normal peak pressure and compliance, Type A tympanogram, consistent with normal middle ear function. LEFT EAR:  Hearing Sensitivity: Mild through 3000 Hz sloping to moderately-severe sensorineural hearing loss. Speech Recognition Threshold: 30 dBHL  Word Recognition: Excellent (96%), based on NU-6 25-word list at 65 dBHL using recorded speech stimuli.     Tympanometry: Normal peak pressure and compliance, Type

## 2023-09-20 DIAGNOSIS — G25.81 RESTLESS LEG SYNDROME: ICD-10-CM

## 2023-09-21 ENCOUNTER — OFFICE VISIT (OUTPATIENT)
Dept: ENT CLINIC | Age: 83
End: 2023-09-21

## 2023-09-21 VITALS
DIASTOLIC BLOOD PRESSURE: 87 MMHG | SYSTOLIC BLOOD PRESSURE: 158 MMHG | BODY MASS INDEX: 26.68 KG/M2 | WEIGHT: 145 LBS | RESPIRATION RATE: 16 BRPM | TEMPERATURE: 97.1 F | HEART RATE: 58 BPM | OXYGEN SATURATION: 98 % | HEIGHT: 62 IN

## 2023-09-21 DIAGNOSIS — H69.82 DYSFUNCTION OF LEFT EUSTACHIAN TUBE: Primary | ICD-10-CM

## 2023-09-21 RX ORDER — CLONAZEPAM 1 MG/1
.5-1 TABLET ORAL NIGHTLY PRN
Qty: 15 TABLET | Refills: 0 | Status: SHIPPED | OUTPATIENT
Start: 2023-09-21 | End: 2023-10-06

## 2023-09-21 RX ORDER — OFLOXACIN 3 MG/ML
SOLUTION/ DROPS OPHTHALMIC
Qty: 1 EACH | Refills: 0 | Status: SHIPPED | OUTPATIENT
Start: 2023-09-21

## 2023-09-21 RX ORDER — CLONAZEPAM 1 MG/1
TABLET ORAL
Qty: 30 TABLET | OUTPATIENT
Start: 2023-09-21

## 2023-09-21 NOTE — TELEPHONE ENCOUNTER
[facility] 3/14/23 WITH DR LANGFORD FOR ANXIETY [place] NONE  Return in about 6 months (around 9/14/2023).   PLEASE CALL PT TO SCHEDULE APPT FOR ANXIETY

## 2023-09-21 NOTE — PROGRESS NOTES
Dysfunction of left eustachian tube   Post Op: Same  Procedure: left-sided myringotomy with tympanostomy tube placement  Consent: Written obtained  Surgeon: Dr. Yvon Enriquez    Description of procedure:    Consent was obtained after discussion concerning the risk, benefits, and alternatives of the procedure were discussed with the patient. Discussed risk include but are not limited to persistent tympanic membrane perforation, bleeding, infection, hearing loss. Patient was placed in the reclined position the procedure chair. With the use of an operating microscopy and an otologic speculum, the left external auditory canal was examined. Any cerumen was removed as necessary using instrumentation. The tympanic membrane was visualize, revealing intact tympanic membrane without retraction or effusion. Topical phenol was applied to the anterior-inferior quadrant of left-sided tympanic membrane. A myringotomy knife was used to make a radial incision in the tympanic membrane. A 3-suction was used to evaluate the middle ear space which was noted to be dry. An alligator forceps was used to place a Paparella type PE tube in the myringotomy, followed by 4 drops of Ciprodex otic solution. A cottonball was placed in the distal aspect of the ear canal.  The patient tolerated the procedure well. There were no complications with the procedure. Assessment and Plan     1. Dysfunction of left eustachian tube  -Still very bothersome. No improvement with Flonase. I did inform the patient that given her normal tympanogram findings on her last comprehensive audiological evaluation she may or may not get relief from PE tube placement but it is an option she would like to try. Left PE tube placed under local anesthesia today after description of procedure as well as the associated risk, benefits, alternatives were discussed in detail with the patient.    -Ofloxacin GGT to left ear x5 days.       Follow-Up     Return if symptoms

## 2023-10-03 ENCOUNTER — OFFICE VISIT (OUTPATIENT)
Dept: FAMILY MEDICINE CLINIC | Age: 83
End: 2023-10-03
Payer: MEDICARE

## 2023-10-03 VITALS
SYSTOLIC BLOOD PRESSURE: 114 MMHG | DIASTOLIC BLOOD PRESSURE: 70 MMHG | WEIGHT: 145 LBS | HEART RATE: 58 BPM | BODY MASS INDEX: 26.68 KG/M2 | HEIGHT: 62 IN | OXYGEN SATURATION: 99 %

## 2023-10-03 DIAGNOSIS — F41.9 ANXIETY: ICD-10-CM

## 2023-10-03 DIAGNOSIS — G25.81 RESTLESS LEG SYNDROME: Primary | ICD-10-CM

## 2023-10-03 DIAGNOSIS — Z23 NEED FOR INFLUENZA VACCINATION: ICD-10-CM

## 2023-10-03 DIAGNOSIS — M79.662 PAIN OF LEFT CALF: ICD-10-CM

## 2023-10-03 PROCEDURE — 4004F PT TOBACCO SCREEN RCVD TLK: CPT

## 2023-10-03 PROCEDURE — 99213 OFFICE O/P EST LOW 20 MIN: CPT

## 2023-10-03 PROCEDURE — G8399 PT W/DXA RESULTS DOCUMENT: HCPCS

## 2023-10-03 PROCEDURE — 1090F PRES/ABSN URINE INCON ASSESS: CPT

## 2023-10-03 PROCEDURE — G8427 DOCREV CUR MEDS BY ELIG CLIN: HCPCS

## 2023-10-03 PROCEDURE — G8419 CALC BMI OUT NRM PARAM NOF/U: HCPCS

## 2023-10-03 PROCEDURE — G8484 FLU IMMUNIZE NO ADMIN: HCPCS

## 2023-10-03 PROCEDURE — 1123F ACP DISCUSS/DSCN MKR DOCD: CPT

## 2023-10-03 RX ORDER — CLONAZEPAM 1 MG/1
.5-1 TABLET ORAL NIGHTLY PRN
Qty: 30 TABLET | Refills: 2 | Status: SHIPPED | OUTPATIENT
Start: 2023-10-06 | End: 2024-01-04

## 2023-10-03 SDOH — ECONOMIC STABILITY: FOOD INSECURITY: WITHIN THE PAST 12 MONTHS, THE FOOD YOU BOUGHT JUST DIDN'T LAST AND YOU DIDN'T HAVE MONEY TO GET MORE.: NEVER TRUE

## 2023-10-03 SDOH — ECONOMIC STABILITY: INCOME INSECURITY: HOW HARD IS IT FOR YOU TO PAY FOR THE VERY BASICS LIKE FOOD, HOUSING, MEDICAL CARE, AND HEATING?: NOT HARD AT ALL

## 2023-10-03 SDOH — ECONOMIC STABILITY: FOOD INSECURITY: WITHIN THE PAST 12 MONTHS, YOU WORRIED THAT YOUR FOOD WOULD RUN OUT BEFORE YOU GOT MONEY TO BUY MORE.: NEVER TRUE

## 2023-10-03 SDOH — ECONOMIC STABILITY: HOUSING INSECURITY
IN THE LAST 12 MONTHS, WAS THERE A TIME WHEN YOU DID NOT HAVE A STEADY PLACE TO SLEEP OR SLEPT IN A SHELTER (INCLUDING NOW)?: NO

## 2023-10-03 ASSESSMENT — ENCOUNTER SYMPTOMS
COLOR CHANGE: 0
WHEEZING: 0
BACK PAIN: 0
COUGH: 0
SHORTNESS OF BREATH: 0
CHEST TIGHTNESS: 0
PHOTOPHOBIA: 0

## 2023-10-03 NOTE — PATIENT INSTRUCTIONS

## 2023-10-03 NOTE — PROGRESS NOTES
Keely Paulino (:  1940) is a 79 y.o. female,Established patient, here for evaluation of the following chief complaint(s): Anxiety (ANXIETY ROUTINE FOLLOW UP )         ASSESSMENT/PLAN:  1. Restless leg syndrome  -Well-controlled with class pain 0.5-1 mg tablet nightly  -Has been attempted to wean off of it and change in benzodiazepines in the past, poorly tolerated. -Med contract and UDS are up-to-date. -PDMP/OARRS reviewed. -Refill sent to the pharmacy.  -Follow-up in 3 months for anxiety and AWV. -     clonazePAM (KLONOPIN) 1 MG tablet; Take 0.5-1 tablets by mouth nightly as needed for Anxiety (RLS) for up to 90 days. TAKE 1/2 TO 1 TABLET BY MOUTH ONCE NIGHTLY Max Daily Amount: 1 mg, Disp-30 tablet, R-2Normal  2. Anxiety  -See above. 3. Pain of left calf  -Likely musculoskeletal given negative physical examination and HPI. 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 Recommending wearing shoes to do housework or extended standing.  -Stretches provided in AVS.  RICE, heat, acetaminophen.  -Follow-up if no improvement. 4. Need for influenza vaccination  -     Influenza, FLUAD, (age 72 y+), IM, PF, 0.5 mL      Return in about 3 months (around 1/3/2024) for Annual Wellness Visit, Controlled Medications. Subjective   SUBJECTIVE/OBJECTIVE:  HPI  She has been taking clonazepam for around 40 years. She takes a half tablet, helps her restless legs at night. She takes it every night. One time she ran out and could not sleep for 2 nights. This was when someone tried to get her off of clonazepam, but it did not go well at all. Sometimes she takes a whole tablet if she has trouble falling asleep. Overall she feels she is doing pretty well. Additionally, last Friday she developed left lateral lower leg pain while standing and cleaning Friday. It does not hurt to touch, but it hurts to put weight on her leg. She had only been up for an hour, no having problems with it before.  It

## 2023-10-10 DIAGNOSIS — M17.11 PRIMARY OSTEOARTHRITIS OF RIGHT KNEE: ICD-10-CM

## 2023-10-10 DIAGNOSIS — E78.2 MIXED HYPERLIPIDEMIA: ICD-10-CM

## 2023-10-11 RX ORDER — ATORVASTATIN CALCIUM 40 MG/1
TABLET, FILM COATED ORAL
Qty: 90 TABLET | Refills: 0 | Status: SHIPPED | OUTPATIENT
Start: 2023-10-11

## 2023-10-11 RX ORDER — CELECOXIB 200 MG/1
200 CAPSULE ORAL DAILY
Qty: 90 CAPSULE | Refills: 0 | Status: SHIPPED | OUTPATIENT
Start: 2023-10-11

## 2023-10-16 PROCEDURE — G0008 ADMIN INFLUENZA VIRUS VAC: HCPCS

## 2023-10-16 PROCEDURE — 90694 VACC AIIV4 NO PRSRV 0.5ML IM: CPT

## 2024-01-24 RX ORDER — CELECOXIB 200 MG/1
200 CAPSULE ORAL DAILY
COMMUNITY
End: 2024-01-24 | Stop reason: SDUPTHER

## 2024-01-24 RX ORDER — ATORVASTATIN CALCIUM 40 MG/1
40 TABLET, FILM COATED ORAL DAILY
COMMUNITY
End: 2024-01-24 | Stop reason: SDUPTHER

## 2024-01-24 RX ORDER — ATORVASTATIN CALCIUM 40 MG/1
40 TABLET, FILM COATED ORAL DAILY
Qty: 90 TABLET | Refills: 0 | Status: SHIPPED | OUTPATIENT
Start: 2024-01-24

## 2024-01-24 RX ORDER — CELECOXIB 200 MG/1
200 CAPSULE ORAL DAILY
Qty: 90 CAPSULE | Refills: 0 | Status: SHIPPED | OUTPATIENT
Start: 2024-01-24

## 2024-02-01 NOTE — PATIENT INSTRUCTIONS
You may receive a survey regarding the care you received during your visit.  Your input is valuable to us.  We encourage you to complete and return your survey.  We hope you will choose us in the future for your healthcare needs. GENERAL OFFICE POLICIES      Telephone Calls: Messages will be answered within 1-2 business days, unless the provider is out of the office.  If it is urgent a covering provider will answer. (this does not include Medication refills).    MyChart:  We recommend all patients sign up for [facility].  Through this portal you can see your lab results, request refills, schedule appointments, pay your bill and send messages to the office.   [facility] messages will be answered within 1-2 business days unless the provider is out of the office.  For urgent matters, please call the office.  Appointments:  All appointments must be scheduled.  We ask all patients to schedule their next follow up appointment before they leave the office to make sure you will be able to be seen before you run out of medications.  24 hours notice is required to cancel or reschedule an appointment to avoid being marked as a no show.  You may be dismissed from the practice after 3 no shows.    LATE for Appointment: If you are 15 or more minutes late for your appointment, you may be asked to reschedule.  MA/LAB APPTS: Must be scheduled, cannot accept walk in lab visits.  We only draw labs for patients established in our office.  We only do injections for medications ordered by our office.  Acute Sick Visits:  Nothing other than acute complaint will be addressed at this visit.  TRADITIONAL MEDICARE  DOES NOT COVER PHYSICALS  MEDICARE WELLNESS VISITS: These are NOT physicals but the free annual visit offered by Medicare to discuss wellness issues. Medication refills, checkups, etc. will not be addressed during this visit.  Medication Refills: Refills are handled electronically so please contact your pharmacy for medication refills

## 2024-02-06 ENCOUNTER — OFFICE VISIT (OUTPATIENT)
Dept: FAMILY MEDICINE CLINIC | Age: 84
End: 2024-02-06
Payer: MEDICARE

## 2024-02-06 VITALS
OXYGEN SATURATION: 97 % | SYSTOLIC BLOOD PRESSURE: 140 MMHG | HEART RATE: 55 BPM | HEIGHT: 62 IN | DIASTOLIC BLOOD PRESSURE: 78 MMHG | WEIGHT: 151 LBS | BODY MASS INDEX: 27.79 KG/M2

## 2024-02-06 DIAGNOSIS — R73.03 PREDIABETES: ICD-10-CM

## 2024-02-06 DIAGNOSIS — N18.31 CHRONIC RENAL IMPAIRMENT, STAGE 3A (HCC): ICD-10-CM

## 2024-02-06 DIAGNOSIS — E78.2 MIXED HYPERLIPIDEMIA: ICD-10-CM

## 2024-02-06 DIAGNOSIS — G25.81 RESTLESS LEG SYNDROME: ICD-10-CM

## 2024-02-06 DIAGNOSIS — Z00.00 MEDICARE ANNUAL WELLNESS VISIT, SUBSEQUENT: Primary | ICD-10-CM

## 2024-02-06 DIAGNOSIS — E55.9 VITAMIN D DEFICIENCY: ICD-10-CM

## 2024-02-06 DIAGNOSIS — J43.8 OTHER EMPHYSEMA (HCC): ICD-10-CM

## 2024-02-06 DIAGNOSIS — Z79.899 LONG TERM USE OF DRUG: ICD-10-CM

## 2024-02-06 PROCEDURE — 99214 OFFICE O/P EST MOD 30 MIN: CPT

## 2024-02-06 PROCEDURE — G8419 CALC BMI OUT NRM PARAM NOF/U: HCPCS

## 2024-02-06 PROCEDURE — G8427 DOCREV CUR MEDS BY ELIG CLIN: HCPCS

## 2024-02-06 PROCEDURE — 1090F PRES/ABSN URINE INCON ASSESS: CPT

## 2024-02-06 PROCEDURE — G0439 PPPS, SUBSEQ VISIT: HCPCS

## 2024-02-06 PROCEDURE — 80305 DRUG TEST PRSMV DIR OPT OBS: CPT

## 2024-02-06 PROCEDURE — 3023F SPIROM DOC REV: CPT

## 2024-02-06 PROCEDURE — 4004F PT TOBACCO SCREEN RCVD TLK: CPT

## 2024-02-06 PROCEDURE — G8484 FLU IMMUNIZE NO ADMIN: HCPCS

## 2024-02-06 PROCEDURE — 1123F ACP DISCUSS/DSCN MKR DOCD: CPT

## 2024-02-06 PROCEDURE — G8399 PT W/DXA RESULTS DOCUMENT: HCPCS

## 2024-02-06 ASSESSMENT — PATIENT HEALTH QUESTIONNAIRE - PHQ9
SUM OF ALL RESPONSES TO PHQ9 QUESTIONS 1 & 2: 0
1. LITTLE INTEREST OR PLEASURE IN DOING THINGS: 0
SUM OF ALL RESPONSES TO PHQ QUESTIONS 1-9: 0
2. FEELING DOWN, DEPRESSED OR HOPELESS: 0
SUM OF ALL RESPONSES TO PHQ QUESTIONS 1-9: 0

## 2024-02-06 NOTE — PROGRESS NOTES
Medicare Annual Wellness Visit    Keely Paulino is here for Medicare AWV (MEDICARE AWV- pt is not fasting)    Assessment & Plan   Medicare annual wellness visit, subsequent  -Personal medical history, surgical history, family history reviewed.  Medications reconciled.  -Care gaps addressed.  Agreeable to screenings, deferred vaccinations today.  -AWV healthcare topics discussed.  Continue to be healthy lifestyle goals, continue reading.  -Educated on office policies and procedures.  -Follow-up in 3 months for controlled medication/restless leg syndrome.  Restless leg syndrome  -Well-controlled on Klonopin half tablet nightly..  -Med contract and UDS updated today.  -PDMP/OARRS reviewed.  -Refill sent to pharmacy.  -Follow-up in 3 months.  Long term use of drug  -     POCT Rapid Drug Screen  Chronic renal impairment, stage 3a (HCC)  -Managing with avoidance of nephrotoxic drugs, avoiding alcohol.  -Increase hydration  -Follow-up in 6 months.  -     Comprehensive Metabolic Panel; Future  Other emphysema (HCC)  -Managing with limiting smoking, without medications.  -Follow-up in 6 months.  -     CBC with Auto Differential; Future  Mixed hyperlipidemia  -Managing with healthy lifestyle modifications, with atorvastatin.  -Evaluate with fasting blood work, follow-up based on results.  Plan to follow-up annually.  -     Lipid Panel; Future  Prediabetes  -Managing with healthy lifestyle modifications, with atorvastatin.  -Evaluate with fasting blood work, follow-up based on results.  Plan to follow-up annually.  -     Hemoglobin A1C; Future  Vitamin D deficiency  -     Vitamin D 25 Hydroxy; Future    Recommendations for Preventive Services Due: see orders and patient instructions/AVS.  Recommended screening schedule for the next 5-10 years is provided to the patient in written form: see Patient Instructions/AVS.     Return in about 3 months (around 5/6/2024) for Controlled Medications.     Subjective   The following acute

## 2024-03-29 DIAGNOSIS — G25.81 RESTLESS LEG SYNDROME: ICD-10-CM

## 2024-03-29 RX ORDER — CLONAZEPAM 1 MG/1
TABLET ORAL
Qty: 30 TABLET | Refills: 0 | Status: SHIPPED | OUTPATIENT
Start: 2024-03-29 | End: 2024-04-28

## 2024-04-08 RX ORDER — CELECOXIB 200 MG/1
200 CAPSULE ORAL DAILY
Qty: 90 CAPSULE | Refills: 3 | Status: SHIPPED | OUTPATIENT
Start: 2024-04-08

## 2024-04-08 RX ORDER — ATORVASTATIN CALCIUM 40 MG/1
40 TABLET, FILM COATED ORAL DAILY
Qty: 90 TABLET | Refills: 3 | Status: SHIPPED | OUTPATIENT
Start: 2024-04-08

## 2024-06-01 DIAGNOSIS — G25.81 RESTLESS LEG SYNDROME: ICD-10-CM

## 2024-06-03 RX ORDER — CLONAZEPAM 1 MG/1
TABLET ORAL
Qty: 30 TABLET | Refills: 0 | Status: SHIPPED | OUTPATIENT
Start: 2024-06-03 | End: 2024-07-03

## 2024-06-04 ENCOUNTER — OFFICE VISIT (OUTPATIENT)
Dept: ENT CLINIC | Age: 84
End: 2024-06-04
Payer: MEDICARE

## 2024-06-04 VITALS
HEIGHT: 62 IN | WEIGHT: 146 LBS | HEART RATE: 60 BPM | SYSTOLIC BLOOD PRESSURE: 154 MMHG | DIASTOLIC BLOOD PRESSURE: 88 MMHG | BODY MASS INDEX: 26.87 KG/M2 | TEMPERATURE: 97.5 F | OXYGEN SATURATION: 96 %

## 2024-06-04 DIAGNOSIS — J30.0 VASOMOTOR RHINITIS: ICD-10-CM

## 2024-06-04 DIAGNOSIS — H69.92 DYSFUNCTION OF LEFT EUSTACHIAN TUBE: Primary | ICD-10-CM

## 2024-06-04 DIAGNOSIS — H61.22 IMPACTED CERUMEN OF LEFT EAR: ICD-10-CM

## 2024-06-04 PROCEDURE — 1090F PRES/ABSN URINE INCON ASSESS: CPT | Performed by: STUDENT IN AN ORGANIZED HEALTH CARE EDUCATION/TRAINING PROGRAM

## 2024-06-04 PROCEDURE — G8419 CALC BMI OUT NRM PARAM NOF/U: HCPCS | Performed by: STUDENT IN AN ORGANIZED HEALTH CARE EDUCATION/TRAINING PROGRAM

## 2024-06-04 PROCEDURE — G8399 PT W/DXA RESULTS DOCUMENT: HCPCS | Performed by: STUDENT IN AN ORGANIZED HEALTH CARE EDUCATION/TRAINING PROGRAM

## 2024-06-04 PROCEDURE — 99214 OFFICE O/P EST MOD 30 MIN: CPT | Performed by: STUDENT IN AN ORGANIZED HEALTH CARE EDUCATION/TRAINING PROGRAM

## 2024-06-04 PROCEDURE — 1123F ACP DISCUSS/DSCN MKR DOCD: CPT | Performed by: STUDENT IN AN ORGANIZED HEALTH CARE EDUCATION/TRAINING PROGRAM

## 2024-06-04 PROCEDURE — 4004F PT TOBACCO SCREEN RCVD TLK: CPT | Performed by: STUDENT IN AN ORGANIZED HEALTH CARE EDUCATION/TRAINING PROGRAM

## 2024-06-04 PROCEDURE — 69210 REMOVE IMPACTED EAR WAX UNI: CPT | Performed by: STUDENT IN AN ORGANIZED HEALTH CARE EDUCATION/TRAINING PROGRAM

## 2024-06-04 PROCEDURE — G8427 DOCREV CUR MEDS BY ELIG CLIN: HCPCS | Performed by: STUDENT IN AN ORGANIZED HEALTH CARE EDUCATION/TRAINING PROGRAM

## 2024-06-04 RX ORDER — IPRATROPIUM BROMIDE 42 UG/1
2 SPRAY, METERED NASAL 3 TIMES DAILY PRN
Qty: 1 EACH | Refills: 3 | Status: SHIPPED | OUTPATIENT
Start: 2024-06-04

## 2024-06-04 NOTE — PROGRESS NOTES
Height: 1.575 m (5' 2\")       PHYSICAL EXAM  BP (!) 154/88 (Site: Left Upper Arm, Position: Sitting, Cuff Size: Medium Adult)   Pulse 60   Temp 97.5 °F (36.4 °C) (Infrared)   Ht 1.575 m (5' 2\")   Wt 66.2 kg (146 lb)   SpO2 96%   BMI 26.70 kg/m²     GENERAL: No acute distress, alert and oriented.  EYES: EOMI, Anti-icteric.  NOSE: On anterior rhinoscopy there is no epistaxis, nasal mucosa moist and normal appearing, no purulent drainage.   EARS: Normal external appearance; on portable otomicroscopy:     -Ad: External auditory canal without stenosis, tympanic membrane clear, no middle ear effusions or retractions     -As: External auditory canal without stenosis, tympanic membrane clear, no middle ear effusions or retractions  FACE: HB 1/6 bilaterally, symmetric appearing, sensation equal bilaterally  ORAL CAVITY: No masses or lesions visualized or palpated, uvula is midline, moist mucous membranes, no oropharyngeal masses or oropharyngeal obstruction  NECK: Normal range of motion, no thyromegaly, trachea is midline, no palpable lymphadenopathy or neck masses, no crepitus  CHEST: Normal respiratory effort, breathing comfortably, no retractions  SKIN: No rashes, normal appearing skin, no evidence of skin lesions/tumors  NEURO: Cranial Nerves 2, 3, 4, 5, 6, 7, 11, 12 grossly intact bilaterally     I have performed a head and neck physical exam personally or was physically present during the key or critical portions of the service.    Procedure     Procedure: Binocular otomicroscopy with debridement of cerumen impaction    Pre-op: Cerumen impaction of the left external auditory canal  Post op: Same  Procedure : Binocular otomicroscopy with debridement of cerumen of left external auditory canal  Surgeon: Dr. Barry Craven DO  Estimated Blood Loss: None    Description of Procedure:    After obtaining verbal consent, the patient was placed in the examination chair in the reclined position.      -Left ear: External

## 2024-08-08 DIAGNOSIS — G25.81 RESTLESS LEG SYNDROME: ICD-10-CM

## 2024-08-08 RX ORDER — CLONAZEPAM 1 MG/1
TABLET ORAL
Qty: 30 TABLET | OUTPATIENT
Start: 2024-08-08

## 2024-08-12 DIAGNOSIS — G25.81 RESTLESS LEG SYNDROME: ICD-10-CM

## 2024-08-12 RX ORDER — CLONAZEPAM 1 MG/1
TABLET ORAL
Qty: 30 TABLET | OUTPATIENT
Start: 2024-08-12

## 2024-08-12 NOTE — TELEPHONE ENCOUNTER
LV 2/6/24 WITH LG FOR AWV NV NONE  Return in about 3 months (around 5/6/2024) for Controlled Medications.   PLEASE CALL PT TO SCHEDULE APPT

## 2024-08-13 RX ORDER — CLONAZEPAM 1 MG/1
.5-1 TABLET ORAL NIGHTLY PRN
Qty: 30 TABLET | Refills: 0 | Status: SHIPPED | OUTPATIENT
Start: 2024-08-13 | End: 2024-09-10 | Stop reason: SDUPTHER

## 2024-08-13 NOTE — TELEPHONE ENCOUNTER
Patient has schedule a appointment with Brendon on 9/10/24 @ 11:10. Please refill her medication. Please give her a call back.     [facility]- [address] [place] [address] - phone [phone]

## 2024-09-10 ENCOUNTER — OFFICE VISIT (OUTPATIENT)
Dept: FAMILY MEDICINE CLINIC | Age: 84
End: 2024-09-10

## 2024-09-10 ENCOUNTER — OFFICE VISIT (OUTPATIENT)
Dept: ENT CLINIC | Age: 84
End: 2024-09-10
Payer: MEDICARE

## 2024-09-10 VITALS
HEIGHT: 62 IN | DIASTOLIC BLOOD PRESSURE: 74 MMHG | BODY MASS INDEX: 27.02 KG/M2 | OXYGEN SATURATION: 96 % | WEIGHT: 146.8 LBS | SYSTOLIC BLOOD PRESSURE: 134 MMHG | HEART RATE: 57 BPM

## 2024-09-10 VITALS
BODY MASS INDEX: 27.23 KG/M2 | TEMPERATURE: 97.5 F | HEIGHT: 62 IN | HEART RATE: 59 BPM | WEIGHT: 148 LBS | OXYGEN SATURATION: 96 % | SYSTOLIC BLOOD PRESSURE: 173 MMHG | DIASTOLIC BLOOD PRESSURE: 79 MMHG

## 2024-09-10 DIAGNOSIS — G25.81 RESTLESS LEG SYNDROME: ICD-10-CM

## 2024-09-10 DIAGNOSIS — H90.3 SENSORINEURAL HEARING LOSS (SNHL) OF BOTH EARS: ICD-10-CM

## 2024-09-10 DIAGNOSIS — G47.00 INSOMNIA, UNSPECIFIED TYPE: Primary | ICD-10-CM

## 2024-09-10 DIAGNOSIS — R73.03 PREDIABETES: ICD-10-CM

## 2024-09-10 DIAGNOSIS — H69.92 DYSFUNCTION OF LEFT EUSTACHIAN TUBE: Primary | ICD-10-CM

## 2024-09-10 LAB — HBA1C MFR BLD: 6.2 %

## 2024-09-10 PROCEDURE — G8399 PT W/DXA RESULTS DOCUMENT: HCPCS | Performed by: STUDENT IN AN ORGANIZED HEALTH CARE EDUCATION/TRAINING PROGRAM

## 2024-09-10 PROCEDURE — 1090F PRES/ABSN URINE INCON ASSESS: CPT | Performed by: STUDENT IN AN ORGANIZED HEALTH CARE EDUCATION/TRAINING PROGRAM

## 2024-09-10 PROCEDURE — 1123F ACP DISCUSS/DSCN MKR DOCD: CPT | Performed by: STUDENT IN AN ORGANIZED HEALTH CARE EDUCATION/TRAINING PROGRAM

## 2024-09-10 PROCEDURE — G8427 DOCREV CUR MEDS BY ELIG CLIN: HCPCS | Performed by: STUDENT IN AN ORGANIZED HEALTH CARE EDUCATION/TRAINING PROGRAM

## 2024-09-10 PROCEDURE — 4004F PT TOBACCO SCREEN RCVD TLK: CPT | Performed by: STUDENT IN AN ORGANIZED HEALTH CARE EDUCATION/TRAINING PROGRAM

## 2024-09-10 PROCEDURE — 99213 OFFICE O/P EST LOW 20 MIN: CPT | Performed by: STUDENT IN AN ORGANIZED HEALTH CARE EDUCATION/TRAINING PROGRAM

## 2024-09-10 PROCEDURE — G8419 CALC BMI OUT NRM PARAM NOF/U: HCPCS | Performed by: STUDENT IN AN ORGANIZED HEALTH CARE EDUCATION/TRAINING PROGRAM

## 2024-09-10 RX ORDER — FLUTICASONE PROPIONATE 50 MCG
2 SPRAY, SUSPENSION (ML) NASAL DAILY
Qty: 48 G | Refills: 1 | Status: SHIPPED | OUTPATIENT
Start: 2024-09-10

## 2024-09-10 RX ORDER — CLONAZEPAM 1 MG/1
.5-1 TABLET ORAL NIGHTLY PRN
Qty: 90 TABLET | Refills: 1 | Status: SHIPPED | OUTPATIENT
Start: 2024-09-12 | End: 2025-03-11

## 2024-09-10 ASSESSMENT — ENCOUNTER SYMPTOMS
PHOTOPHOBIA: 0
CONSTIPATION: 0
COUGH: 0
DIARRHEA: 0
WHEEZING: 0
SHORTNESS OF BREATH: 0
ABDOMINAL PAIN: 0
VOMITING: 0
ABDOMINAL DISTENTION: 0
CHEST TIGHTNESS: 0
NAUSEA: 0

## 2024-11-05 ENCOUNTER — OFFICE VISIT (OUTPATIENT)
Dept: ENT CLINIC | Age: 84
End: 2024-11-05
Payer: MEDICARE

## 2024-11-05 VITALS
SYSTOLIC BLOOD PRESSURE: 175 MMHG | BODY MASS INDEX: 26.87 KG/M2 | TEMPERATURE: 97.3 F | HEIGHT: 62 IN | HEART RATE: 62 BPM | DIASTOLIC BLOOD PRESSURE: 82 MMHG | OXYGEN SATURATION: 96 % | WEIGHT: 146 LBS

## 2024-11-05 DIAGNOSIS — H69.92 DYSFUNCTION OF LEFT EUSTACHIAN TUBE: Primary | ICD-10-CM

## 2024-11-05 DIAGNOSIS — H90.3 SENSORINEURAL HEARING LOSS (SNHL) OF BOTH EARS: ICD-10-CM

## 2024-11-05 PROCEDURE — G8427 DOCREV CUR MEDS BY ELIG CLIN: HCPCS | Performed by: STUDENT IN AN ORGANIZED HEALTH CARE EDUCATION/TRAINING PROGRAM

## 2024-11-05 PROCEDURE — 1126F AMNT PAIN NOTED NONE PRSNT: CPT | Performed by: STUDENT IN AN ORGANIZED HEALTH CARE EDUCATION/TRAINING PROGRAM

## 2024-11-05 PROCEDURE — 4004F PT TOBACCO SCREEN RCVD TLK: CPT | Performed by: STUDENT IN AN ORGANIZED HEALTH CARE EDUCATION/TRAINING PROGRAM

## 2024-11-05 PROCEDURE — 99213 OFFICE O/P EST LOW 20 MIN: CPT | Performed by: STUDENT IN AN ORGANIZED HEALTH CARE EDUCATION/TRAINING PROGRAM

## 2024-11-05 PROCEDURE — G8484 FLU IMMUNIZE NO ADMIN: HCPCS | Performed by: STUDENT IN AN ORGANIZED HEALTH CARE EDUCATION/TRAINING PROGRAM

## 2024-11-05 PROCEDURE — 1123F ACP DISCUSS/DSCN MKR DOCD: CPT | Performed by: STUDENT IN AN ORGANIZED HEALTH CARE EDUCATION/TRAINING PROGRAM

## 2024-11-05 PROCEDURE — G8399 PT W/DXA RESULTS DOCUMENT: HCPCS | Performed by: STUDENT IN AN ORGANIZED HEALTH CARE EDUCATION/TRAINING PROGRAM

## 2024-11-05 PROCEDURE — 1090F PRES/ABSN URINE INCON ASSESS: CPT | Performed by: STUDENT IN AN ORGANIZED HEALTH CARE EDUCATION/TRAINING PROGRAM

## 2024-11-05 PROCEDURE — G8419 CALC BMI OUT NRM PARAM NOF/U: HCPCS | Performed by: STUDENT IN AN ORGANIZED HEALTH CARE EDUCATION/TRAINING PROGRAM

## 2024-11-05 PROCEDURE — 1159F MED LIST DOCD IN RCRD: CPT | Performed by: STUDENT IN AN ORGANIZED HEALTH CARE EDUCATION/TRAINING PROGRAM

## 2024-11-05 RX ORDER — BRAN 5 G
2 WAFER ORAL 3 TIMES DAILY
Qty: 90 TABLET | Refills: 1 | Status: SHIPPED | OUTPATIENT
Start: 2024-11-05

## 2024-11-05 NOTE — PROGRESS NOTES
[facility]  DIVISION OF OTOLARYNGOLOGY- HEAD & NECK SURGERY  CLINIC FOLLOW-UP VISIT      Patient Name: Keely Paulino  Medical Record Number:  [id number]  Primary Care Physician:  Glischinski, Luke A, APRN - CNP    ChiefComplaint     Chief Complaint   Patient presents with    Ear Problem     Lt ear muffled        History of Present Illness     Keely Paulino is an 84 y.o. female previously seen for left eustachian tube dysfunction, bilateral sensorineural hearing loss. Left PE tube placed under local anesthesia in the office on 2023.     Interval History:   Left ear intermittent fullness. Has been going on for years.  Comes and goes every day.  Almost feels and sounds like like ringing sometimes.  No otalgia or otorrhea. Used nasal sprays for 2 months which did not help.  Left ear tube in past never helped.     Past Medical History     Past Medical History:   Diagnosis Date    Aortic valve calcification 2021    Arthritis     Coronary artery calcification 2021    Diverticulitis     Hiatal hernia 2021    small    History of nonmelanoma skin cancer 2016    Hyperlipidemia     Osteoarthritis     Other emphysema (HCC) 2021    mild    Pulmonary nodule 2021    right 6 mm    Restless legs syndrome     Stage 3a chronic kidney disease (HCC) 2022    Wrist fracture, left 2017    TRIPPED AT SON'S HOUSE       Past Surgical History     Past Surgical History:   Procedure Laterality Date    CHOLECYSTECTOMY      COLON SURGERY  2007    DIVERTICULITIS, REMOVED DIVERTICULA POCKETS    SKIN CANCER EXCISION Left 2019    face    TOTAL KNEE ARTHROPLASTY Right 2019    RIGHT TOTAL KNEE REPLACEMENT performed by Denilson Gastelum MD at [facility] OR       Family History     Family History   Problem Relation Age of Onset    Stroke Mother 80    Alzheimer's Disease Mother 76    Other Father 54        CHOKED AND FELL    Stroke Sister 82    Other Brother         BOTH  FROM ACCIDENTS

## 2024-11-11 NOTE — TELEPHONE ENCOUNTER
Reviewed Status appropriately assessed and documented. All required labs and results reviewed. Treatment approved by provider. Treatment orders and medications verified by 2 Registered Nurses where applicable. Treatment plan was confirmed with patient prior to administration, and educated the need to report any treatment-related symptoms      Ambulated to infusion area, here today for chemotherapy. No concerns at this time. Right single lumen PICC, positive blood return noted. Labs reviewed, Labs within defined limits.  Pt had radiation treatment, OV with Dr. Dumont and meeting the [place] dietician. Chemo administered as ordered. Call light within reach. Tolerated infusion without incident.  RTC 11/12 for RadOnc. Last chemo treatment.     PICC removed per Dr. Jackson, pressure held for 3-5 minutes and then wrapped with gauze and coban.  Advised pt to left wrapped to at least 30 minutes.

## 2024-11-18 NOTE — TELEPHONE ENCOUNTER
LV 6/30/22 WITH DR LANGFORD FOR DM NV 10/11/22 Chief Complaint: Diabetes Mellitus follow up    INTERVAL HX:  Patient seen at bedside with her family at bedside.   Continues on 24hrs TF Casie Frazier at 80cc/hr.   BG over the last 24 hrs have been slightly below to within goal range 100-180mg/dl. No hypoglycemia.     Review of Systems:  unable    Allergies    walnut (Unknown)  metronidazole (Rash)  Lyrica (Unknown)  penicillin (Unknown)  Pineapple (Unknown)  Tagamet (Unknown)  heparin (Unknown)  Pecans (Unknown)  Hazelnut (Unknown)  meropenem (Rash)    Intolerances      MEDICATIONS  (STANDING):  albuterol/ipratropium for Nebulization 3 milliLiter(s) Nebulizer every 6 hours  artificial  tears Solution 1 Drop(s) Both EYES every 6 hours  ascorbic acid 500 milliGRAM(s) Oral daily  Biotene Dry Mouth Oral Rinse 5 milliLiter(s) Swish and Spit every 6 hours  calcium carbonate 1250 mG  + Vitamin D (OsCal 500 + D) 1 Tablet(s) Oral <User Schedule>  ceftolozane/tazobactam IVPB 3000 milliGRAM(s) IV Intermittent every 8 hours  chlorhexidine 0.12% Liquid 15 milliLiter(s) Oral Mucosa every 12 hours  chlorhexidine 4% Liquid 1 Application(s) Topical daily  dextrose 5%. 1000 milliLiter(s) (100 mL/Hr) IV Continuous <Continuous>  dextrose 5%. 1000 milliLiter(s) (50 mL/Hr) IV Continuous <Continuous>  dextrose 50% Injectable 25 Gram(s) IV Push once  dextrose 50% Injectable 12.5 Gram(s) IV Push once  diclofenac sodium 1% Gel 2 Gram(s) Topical every 6 hours  escitalopram 10 milliGRAM(s) Oral <User Schedule>  fentaNYL   Patch  12 MICROgram(s)/Hr 1 Patch Transdermal every 72 hours  fentaNYL   Patch  25 MICROgram(s)/Hr 1 Patch Transdermal every 72 hours  FIRST- Mouthwash  BLM 5 milliLiter(s) Swish and Spit every 8 hours  gabapentin Solution 250 milliGRAM(s) Oral <User Schedule>  glucagon  Injectable 1 milliGRAM(s) IntraMuscular once  hemorrhoidal Ointment 1 Application(s) Rectal at bedtime  influenza  Vaccine (HIGH DOSE) 0.5 milliLiter(s) IntraMuscular once  insulin lispro (ADMELOG) corrective regimen sliding scale   SubCutaneous every 6 hours  lactobacillus acidophilus 1 Tablet(s) Oral <User Schedule>  levothyroxine 125 MICROGram(s) Oral <User Schedule>  lidocaine   4% Patch 4 Patch Transdermal every 24 hours  melatonin 12 milliGRAM(s) Oral <User Schedule>  midodrine 10 milliGRAM(s) Oral every 8 hours  nystatin Powder 1 Application(s) Topical every 8 hours  pantoprazole  Injectable 40 milliGRAM(s) IV Push every 12 hours  predniSONE  Solution 5 milliGRAM(s) Enteral Tube <User Schedule>  QUEtiapine 12.5 milliGRAM(s) Oral <User Schedule>  sodium chloride 1 Gram(s) Oral every 8 hours  sodium chloride 0.65% Nasal 1 Spray(s) Both Nostrils every 6 hours  vancomycin    Solution 125 milliGRAM(s) Oral every 6 hours  witch hazel Pads 1 Application(s) Topical every 12 hours        insulin glargine Injectable (LANTUS)   10 Unit(s) SubCutaneous (11-18-24 @ 06:00)    insulin lispro (ADMELOG) corrective regimen sliding scale   2 Unit(s) SubCutaneous (11-17-24 @ 23:43)   4 Unit(s) SubCutaneous (11-17-24 @ 18:32)    levothyroxine   125 MICROGram(s) Oral (11-18-24 @ 05:55)    predniSONE  Solution   5 milliGRAM(s) Enteral Tube (11-18-24 @ 08:52)        PHYSICAL EXAM:  VITALS: T(C): 36.4 (11-18-24 @ 10:26)  T(F): 97.5 (11-18-24 @ 10:26), Max: 98.3 (11-18-24 @ 04:54)  HR: 105 (11-18-24 @ 11:49) (97 - 124)  BP: 96/52 (11-18-24 @ 10:26) (88/49 - 108/54)  RR:  (18 - 19)  SpO2:  (89% - 99%)  Wt(kg): --  GENERAL: NAD  Respiratory: Respirations unlabored on ventilator  Extremities: Warm, no edema  NEURO: Alert , appropriate     LABS:  POCT Blood Glucose.: 128 mg/dL (11-18-24 @ 11:58)  POCT Blood Glucose.: 91 mg/dL (11-18-24 @ 05:28)  POCT Blood Glucose.: 196 mg/dL (11-17-24 @ 23:25)  POCT Blood Glucose.: 231 mg/dL (11-17-24 @ 18:07)  POCT Blood Glucose.: 146 mg/dL (11-17-24 @ 12:17)  POCT Blood Glucose.: 130 mg/dL (11-17-24 @ 05:25)  POCT Blood Glucose.: 168 mg/dL (11-16-24 @ 23:30)  POCT Blood Glucose.: 207 mg/dL (11-16-24 @ 17:29)  POCT Blood Glucose.: 197 mg/dL (11-16-24 @ 12:00)  POCT Blood Glucose.: 164 mg/dL (11-16-24 @ 06:15)  POCT Blood Glucose.: 198 mg/dL (11-16-24 @ 00:10)  POCT Blood Glucose.: 187 mg/dL (11-15-24 @ 17:17)                          7.6    22.92 )-----------( 106      ( 18 Nov 2024 07:12 )             27.3     11-18    137  |  105  |  31[H]  ----------------------------<  73  4.6   |  11[L]  |  0.71    Ca    8.7      18 Nov 2024 07:12  Phos  3.5     11-18  Mg     2.0     11-18    TPro  5.5[L]  /  Alb  2.2[L]  /  TBili  0.9  /  DBili  x   /  AST  36  /  ALT  18  /  AlkPhos  106  11-18    eGFR: 90 mL/min/1.73m2 (18 Nov 2024 07:12)      Thyroid Function Tests:      A1C with Estimated Average Glucose Result: 5.8 % (10-14-24 @ 05:08)    Estimated Average Glucose: 120 mg/dL (10-14-24 @ 05:08)        Diet, NPO with Tube Feed:   Tube Feeding Modality: Jejunostomy  [place] Peptide 1.5 ([id number])  Total Volume for 24 Hours (mL): 960  Continuous  Until Goal Tube Feed Rate (mL per Hour): 80  Tube Feed Duration (in Hours): 12  Tube Feed Start Time: 06:00  Free Water Flush  Pump   Rate (mL per Hour): 50   Frequency: Every Hour  Free Water Flush Instructions:  50ml free water flushes Q1hr  Alfred(7 Gm Arginine/7 Gm Glut/1.2 Gm HMB     Qty per Day:  2  No Carb Prosource (1pkg = 15gms Protein)     Qty per Day:  1  Banatrol TF     Qty per Day:  1/2 packet per daily (11-17-24 @ 12:06) [Active]

## 2025-01-11 NOTE — TELEPHONE ENCOUNTER
Ok to refill [facility]  Advanced Heart Failure, MCS, Transplant and Pulmonary Hypertension Cardiology  HF Progress Note       Date: 2025   Patient Name: John Ghosh  : 1969  MRN: [id number]   PCP: Kary Rinaldi MD    Reason for consult: CHF   Physician requesting consult: Yara Garcia MD     Chief complaint: chest tightness and shortness of breath.       HPI:    John Ghosh is a 55 year old male with PMHx of chronic systolic HF (most recent LVEF 23% on 24), CKD, peripheral arterial disease, and DM2. At the end of 10/2024, he had his ICD replaced by Dr. Silveira at [facility], during which he received 2 mg Versed and 100 mcg fentanyl. He later developed congestion and other HF symptoms requiring IV diuretics but was then discharged home. He then obtained a cardiopulmonary stress test which showed abnormalities suggestive of advanced heart failure. He underwent a RHC accordingly in 2024 but underwent respiratory decompensation during the procedure that required an ICU stay w/ intubation and CRRT. He had since been adequately recovering at home and following up w/ the AHF team. However, he began experiencing more episodes of orthopnea and eventually developed CP/tightness that warranted a visit to [facility] ED on 25. He was given nitroglycerin, which alleviated his symptoms. He has been transferred to [facility] for further evaluation w/ the AHF team.     Mr. Ghosh describes that he has been having PND and orthopnea and more abdominal distention within the last several months.      A new tremor has also been noted on his right arm for the past several weeks.     Subj: Pt is seen and examined. Pt is sitting on chair and is responsive.     ROS:  CONSTITUTIONAL:  No weight loss, fever, chills, sweats.  HEENT:  No visual loss, blurred vision, double vision. No hearing loss, sneezing, congestion, runny nose or sore throat.  SKIN:  No rash or  itching.  MUSCULOSKELETAL:  No joint redness or swelling. No recent trauma.  CARDIOVASCULAR:  +chest pain/discomfort. No palpitations, PND, or orthopnea  RESPIRATORY:  No shortness of breath, cough or sputum.  GASTROINTESTINAL:  No anorexia, nausea, vomiting or diarrhea. +abdominal distention  GENITOURINARY:  No burning on urination, no decreased urine output.   NEUROLOGICAL:  No headache, dizziness, syncope, paralysis, ataxia, numbness or tingling in the extremities.   PSYCHIATRIC:  No history of depression or anxiety.        Past Medical History:   Diagnosis Date    Atrial tachycardia (CMD)     Chronic kidney disease     Congestive cardiac failure  (CMD)     COVID-19     Diabetes mellitus  (CMD)     Essential (primary) hypertension     NICM (nonischemic cardiomyopathy)  (CMD)     Paroxysmal supraventricular tachycardia (CMD)     PVD (peripheral vascular disease) (CMD)     Retinopathy     Sleep apnea     no CPAP used    SSS (sick sinus syndrome)  (CMD)        Past Surgical History:   Procedure Laterality Date    Ablation - atrial tachycardia  05/08/2024    Atherectomy Right 02/13/2023    right CFA atherectomy    Cardiac catherization      2010 and 2020- no cad    Hernia repair      with mesh    Icd implant      Pta Left 01/16/2020    left popliteal/TP trunk    Pta Left 04/17/2023    left SFA       Family History   Family history unknown: Yes       Social History     Tobacco Use    Smoking status: Former     Types: Cigarettes    Smokeless tobacco: Never   Vaping Use    Vaping status: never used   Substance Use Topics    Alcohol use: Yes    Drug use: Never       Current Facility-Administered Medications   Medication    insulin glargine (LANTUS) injection 12 Units    insulin lispro (ADMELOG,HumaLOG) - Correction Dose    docusate sodium-sennosides (SENOKOT S) 50-8.6 MG 2 tablet    acetaminophen (TYLENOL) tablet 650 mg    Or    acetaminophen (TYLENOL) suppository 650 mg    ondansetron (ZOFRAN ODT) disintegrating tablet  4 mg    Or    ondansetron (ZOFRAN) injection 4 mg    sodium chloride 0.9 % injection 10 mL    sodium chloride 0.9 % injection 2 mL    milrinone (PRIMACOR) 20 mg/100 mL in dextrose 5 % infusion premix    sodium chloride 0.9% infusion    sodium chloride 0.9% infusion    sodium chloride 0.9% infusion    dextrose 5 % infusion    insulin lispro (ADMELOG,HumaLOG) - Scheduled Mealtime Dose    allopurinol (ZYLOPRIM) tablet 300 mg    atorvastatin (LIPITOR) tablet 40 mg    clopidogrel (PLAVIX) tablet 75 mg    [Held by provider] empagliflozin (JARDIANCE) tablet 10 mg    magnesium oxide (MAG-OX) tablet 400 mg    [Held by provider] metoPROLOL succinate (TOPROL-XL) ER tablet 50 mg    pantoprazole (PROTONIX) EC tablet 40 mg    dextrose 50 % injection 25 g    dextrose 50 % injection 12.5 g    glucagon (GLUCAGEN) injection 1 mg    dextrose (GLUTOSE) 40 % gel 15 g    dextrose (GLUTOSE) 40 % gel 30 g    sodium chloride 0.9 % injection 10 mL    sodium chloride 0.9 % injection 2 mL    sodium chloride (NORMAL SALINE) 0.9 % bolus 500 mL    enoxaparin (LOVENOX) injection 40 mg    polyethylene glycol (MIRALAX) packet 17 g    bisacodyl (DULCOLAX) suppository 10 mg    magnesium hydroxide (MILK OF MAGNESIA) 400 MG/5ML suspension 30 mL    melatonin tablet 3 mg    potassium CHLORIDE (KLOR-CON M) elle ER tablet 40 mEq    ipratropium-albuterol (DUONEB) 0.5-2.5 (3) MG/3ML nebulizer solution 3 mL    [Held by provider] torsemide (DEMADEX) tablet 40 mg       ALLERGIES:   Allergen Reactions    Lidocaine ANAPHYLAXIS     Had reaction during right heart cath. Patient was given versed and lidocaine and developed acute respiratory distress. Patient was intubated and later was given versed while intubated. Unknown which medication caused the reaction             Physical exam:  Visit Vitals  BP 94/49   Pulse 97   Temp 97.9 °F (36.6 °C) (PA Catheter)   Resp 15   Ht 6' 3.98\" (1.93 m)   Wt 108.6 kg (239 lb 6.7 oz)   SpO2 99%   BMI 29.16 kg/m²       GENERAL:   NAD, AOx3.   HEENT:  Sclerae were anicteric and oropharynx was clear.    The carotid upstroke was normal and there were no bruits.   CHEST: Lungs bilat CTA.  No rales  CV:  NSR    No m/r/g    The PMI was not enlarged or displaced. No RV heave.    -JVD  -HJR  ABDOMEN: Soft, non-distended with active bowel sounds.     No hepatomegaly. No ascites  EXTREM: Warm, well-perfused and with no cyanosis, clubbing    No peripheral edema.   SKIN:  No ulcerations or rashes.   NEURO: Non-focal.  PSYCH:  Normal affect, normal mood      Hemodynamics:      Last Value 24 Hour Range   CVP (!) 12 mmHg CVP (mmHg)  Min: 6 mmHg  Max: 13 mmHg   PAS/PAD (!) 39/25 PAP (mmHg)  Min: 38/16  Max: 59/29   PCWP   No data recorded   CO 5.6 l/min Cardiac Output (l/min)  Min: 5.3 l/min  Max: 7.5 l/min   CI (!) 2.3 l/min/m2 Cardiac Index (l/min/m2)  Min: 2.2 l/min/m2  Max: 3.1 l/min/m2   SVR (!) 1205 (dyne*sec)/cm5 SVR (dyne*sec)/cm5  Min: 727 (dyne*sec)/cm5  Max: 1205 (dyne*sec)/cm5   SV02   No data recorded       Recent labs:   I independently reviewed the following labs, echocardiograms, imaging, and procedures    Sodium (mmol/L)   Date Value   01/11/2025 136     Potassium (mmol/L)   Date Value   01/11/2025 4.1     Chloride (mmol/L)   Date Value   01/11/2025 106     Glucose (mg/dL)   Date Value   01/11/2025 135 (H)     Calcium (mg/dL)   Date Value   01/11/2025 8.7     Carbon Dioxide (mmol/L)   Date Value   01/11/2025 28     BUN (mg/dL)   Date Value   01/11/2025 30 (H)     Creatinine (mg/dL)   Date Value   01/11/2025 1.30 (H)       WBC (K/mcL)   Date Value   01/11/2025 5.4     RBC (mil/mcL)   Date Value   01/11/2025 3.77 (L)     HCT (%)   Date Value   01/11/2025 34.5 (L)     HGB (g/dL)   Date Value   01/11/2025 10.8 (L)     PLT (K/mcL)   Date Value   01/11/2025 179       TSH (mcUnits/mL)   Date Value   01/08/2025 3.000     NT-proBNP (pg/mL)   Date Value   01/07/2025 2,517 (H)       Encounter Date: 01/07/25   Electrocardiogram 12-Lead   Result Value     Ventricular Rate EKG/Min (BPM) 85    Atrial Rate (BPM) 85    IL-Interval (MSEC) 172    QRS-Interval (MSEC) 116    QT-Interval (MSEC) 416    QTc 495    P Axis (Degrees) 83    R Axis (Degrees) -83    T Axis (Degrees) -32    REPORT TEXT      Sinus rhythm  with frequent  premature ventricular complexes  Left axis deviation  Right bundle branch block  Abnormal ECG  When compared with ECG of  08-JAN-2025 12:33,  premature ventricular complexes  are now  present  Right bundle branch block  is now  present         Imaging/Diagnostics:    No results found for this or any previous visit.    No results found for this or any previous visit.    No results found for this or any previous visit.    Results for orders placed or performed during the hospital encounter of 01/07/25   Cath/PV Case    Narrative    CONCLUSIONS:    1.  Altered mental status, hypotension and clinical shock during right   heart catheterization.  Nearly identical response as previous right heart   catheterization with resolution after the administration of vasopressor   agents to increase blood pressure as well as intravenous milrinone.    2.  Prior to the above only pulmonary pressures were measured which were   moderate to severely elevated.  Cardiac output was increased but may be   falsely elevated due to the underlying pathophysiology detailed above.      RECOMMENDATIONS:     ICU team to assist in management as patient is critically ill  Wean pressors as able but if continues to demonstrate ongoing shock with   rising lactate may need intra-aortic balloon pump  If clinically improves could then reassess need for inotropes and assess   hemodynamics off of inotropes and at bedside  If hemodynamics remain normal given cardiopulmonary stress test findings   and symptoms with recurrent heart failure could assess hemodynamics post   ambulation  Ongoing workup for advanced therapies  A lot of care was taken prior to this procedure given his previous    response which was nearly identical.  I have concerns that he cannot   tolerate fentanyl for which he only received 50 mcg in 25 mcg doses and   demonstrated resultant severe hypotension.  I believe that he shows   limited hemodynamic reserve given his underlying cardiac status.  Additional care was taken to avoid any other agents that may have produced   an allergic response which was also ruled out by members of our allergy   team             Impression and plan:      #Chronic systolic HFrEF  #Hx CS   - Etiology: unclear but combination of mixed nonobstructive ischemic, nonischemic diabetic, idiopathic or familial   - NYHA Stage IV, AHA Stage C   - NICM 2009 (EF35% range)-> up to 38% on nuclear 10/2018 --> more recently ejection fraction has been in the 16 to 25% range  - Recent echocardiogram demonstrated an LV end-diastolic dimension of 6.7 cm  - Risk factors for poor prognosis include 2 admits last year for heart failure, LV ejection fraction less than 25%  - TTE 11/20/24: EF 23%, LV has dilated cavity w/ mildly increased wall thickness. Severe hypokinesis of basal-midinferolateral and inferior walls. RV has dilated cavity w/reduced systolic function. Respirophasic diameter blunted. Small left pericardial effusion identified circumferential to the heart.   - [facility] may 2010- no CAD  - [facility] 7/13/20: no CAD  - S/p IABP removal (11/21/24) and off Milrinone since (11/22/24).  - Cardiomyopathy lab panel was mostly negative for underlying etiology which is detailed above  - Has ICD which may limit cardiac MRI  Current GDMT:  - Diuretic: Holding Torsemide 40 mg PO BID  - BB: Holding Metoprolol 50 mg PO QD  - RAASi: None  - MRA:  None  - SGLT2i: Holding Jardiance 10 mg PO QD  - Hydral/Nitrate: None  - Inotrope: Milrinone 0.25 mcg/kg/min IV  - AA: None  - AC: Plavix 75 mg PO QD, Enoxaparin 40 mg subQ nightly  - Other:  Atorvastatin 40 mg PO QD, Plavix 75 mg PO QD  Device therapy: ICD w/o CRT given narrow QRS  complex.  Advanced therapies:   (12/23/24): Pt consented to VAD/OHT work-up w/ Dr. Nicole.     #Hx possible anaphylaxis during procedure   - Pt was undergoing RHC on 11/14/2024.   - Pt received subcu lidocaine and 1 mg Versed  - Occurred while laying flat. Concern of flash pulm. Edema given precipitous decline initiated by an emetic episode, severe itching, and consequent respiratory distress.    #CKD  - Baseline Cr 1.5-1.7, peak Cr 3.63   - Has had some admissions and creatinines that holden after procedures to 2.2 and then had downtrended  - CREATININE: 1.30   - Previously on CRRT (11/21-11/23).  - On Torsemide  - Renal recs appreciated    #PAD   - (2/13/23): R CFA ATHERECTOMY. HAS RESIDUAL 70% L SFA. (ICD-443.9) ([id number])  PAD: 1/16/20: PTA of L popliteal and TP trunk           2/13/23: R CFA atherectomy..           4/17/23: L SFA PTA   - Currently no symptoms of CLI  - Off Heparin gtt given weak positive PF4.  - On Plavix    #DM TYPE II   - Patient claims that he may have had retinal treatment in the past but his vision is good  - He does have peripheral arterial disease which has been treated detailed above  - No neuropathy  - A1c 7.1 @ recent endocrine visit  - Continue to follow      Plan:  - Pt consented to VAD and OHT work-up on 12/23/24. Continue ongoing work-up.  -continue milrinone 0.25 mcg/kg/min  -monitor BP and consider adding Entresto.   - Holding Torsemide 40 mg PO BID, monitor renal function. Can change to 40 mg PO QD. Monitor hemos with PO diuretic  - Continue GDMT as above.     Charting performed by martín Nick for Dr. Walker.     The documentation recorded by the taviaibjen accurately and completely reflects the service(s) I personally performed and the decisions made by me.     Brandie Walker MD [facility]  Advanced Heart Failure, Cardiac Transplant and Mechanical Circulatory Support     Total critical care time: > 35 minutes  Due to a high probability of clinically significant,  life-threatening deterioration, the patient required my highest level of preparedness to intervene emergently and I have personally spent the critical care time (excluding billable procedure time) directly and personally managing the patient.

## 2025-01-27 RX ORDER — ATORVASTATIN CALCIUM 40 MG/1
40 TABLET, FILM COATED ORAL DAILY
Qty: 90 TABLET | Refills: 0 | Status: SHIPPED | OUTPATIENT
Start: 2025-01-27

## 2025-01-27 RX ORDER — CELECOXIB 200 MG/1
200 CAPSULE ORAL DAILY
Qty: 90 CAPSULE | Refills: 0 | Status: SHIPPED | OUTPATIENT
Start: 2025-01-27

## 2025-02-18 ENCOUNTER — OFFICE VISIT (OUTPATIENT)
Dept: FAMILY MEDICINE CLINIC | Age: 85
End: 2025-02-18

## 2025-02-18 VITALS
HEART RATE: 68 BPM | SYSTOLIC BLOOD PRESSURE: 120 MMHG | OXYGEN SATURATION: 99 % | BODY MASS INDEX: 27.71 KG/M2 | HEIGHT: 62 IN | WEIGHT: 150.6 LBS | DIASTOLIC BLOOD PRESSURE: 74 MMHG

## 2025-02-18 DIAGNOSIS — Z00.00 MEDICARE ANNUAL WELLNESS VISIT, SUBSEQUENT: Primary | ICD-10-CM

## 2025-02-18 DIAGNOSIS — N18.31 CHRONIC RENAL IMPAIRMENT, STAGE 3A (HCC): ICD-10-CM

## 2025-02-18 DIAGNOSIS — R73.03 PREDIABETES: ICD-10-CM

## 2025-02-18 DIAGNOSIS — G25.81 RESTLESS LEG SYNDROME: ICD-10-CM

## 2025-02-18 DIAGNOSIS — Z79.899 LONG TERM USE OF DRUG: ICD-10-CM

## 2025-02-18 DIAGNOSIS — J43.8 OTHER EMPHYSEMA (HCC): ICD-10-CM

## 2025-02-18 DIAGNOSIS — F41.9 ANXIETY: ICD-10-CM

## 2025-02-18 RX ORDER — CLONAZEPAM 1 MG/1
.5-1 TABLET ORAL NIGHTLY PRN
Qty: 90 TABLET | Refills: 1 | Status: SHIPPED | OUTPATIENT
Start: 2025-02-25 | End: 2025-08-24

## 2025-02-18 SDOH — ECONOMIC STABILITY: FOOD INSECURITY: WITHIN THE PAST 12 MONTHS, THE FOOD YOU BOUGHT JUST DIDN'T LAST AND YOU DIDN'T HAVE MONEY TO GET MORE.: NEVER TRUE

## 2025-02-18 SDOH — ECONOMIC STABILITY: FOOD INSECURITY: WITHIN THE PAST 12 MONTHS, YOU WORRIED THAT YOUR FOOD WOULD RUN OUT BEFORE YOU GOT MONEY TO BUY MORE.: NEVER TRUE

## 2025-02-18 ASSESSMENT — PATIENT HEALTH QUESTIONNAIRE - PHQ9
SUM OF ALL RESPONSES TO PHQ9 QUESTIONS 1 & 2: 0
1. LITTLE INTEREST OR PLEASURE IN DOING THINGS: NOT AT ALL
SUM OF ALL RESPONSES TO PHQ QUESTIONS 1-9: 0
SUM OF ALL RESPONSES TO PHQ QUESTIONS 1-9: 0
2. FEELING DOWN, DEPRESSED OR HOPELESS: NOT AT ALL
SUM OF ALL RESPONSES TO PHQ QUESTIONS 1-9: 0
SUM OF ALL RESPONSES TO PHQ QUESTIONS 1-9: 0

## 2025-02-18 NOTE — ASSESSMENT & PLAN NOTE
-Well-controlled on 1 mg Klonopin 0.5-1 tablets nightly as needed.  -Med contract and UDS updated today.  -PDMP/OARRS reviewed.  -Refill sent to pharmacy.  -Follow-up in 3 months.    Orders:    clonazePAM (KLONOPIN) 1 MG tablet; Take 0.5-1 tablets by mouth nightly as needed for Anxiety (Insomnia) for up to 180 days. Max Daily Amount: 1 mg

## 2025-02-18 NOTE — ASSESSMENT & PLAN NOTE
-Uncomplicated  -Continues to smoke  -Declines plan to quit  -Follow-up annually, or sooner as needed

## 2025-02-18 NOTE — ASSESSMENT & PLAN NOTE
Chronic, at goal (stable), lifestyle modifications recommended  -Continue limiting sweets in diet  -Increase hydration, increase physical activity  -Declines blood work  -Follow-up annually  Orders:    POCT microalbumin

## 2025-02-18 NOTE — PROGRESS NOTES
Medicare Annual Wellness Visit    Keely Paulino is here for Medicare AWV (MEDICARE AWV- pt is not fasting for labs )    Assessment & Plan   Assessment & Plan  Medicare annual wellness visit, subsequent   -Personal medical history, surgical history, family history reviewed.  Medications reconciled.  -Care gaps addressed.  Declines screenings, declines vaccinations today.  -Age-appropriate healthcare topics discussed.  -Educated on office policies and procedures.  -Follow-up in 3 months for controlled substances     Anxiety  -Well-controlled on 1 mg Klonopin 0.5-1 tablets nightly as needed.  -Med contract and UDS updated today.  -PDMP/OARRS reviewed.  -Refill sent to pharmacy.  -Follow-up in 3 months.    Orders:    clonazePAM (KLONOPIN) 1 MG tablet; Take 0.5-1 tablets by mouth nightly as needed for Anxiety (Insomnia) for up to 180 days. Max Daily Amount: 1 mg    Restless leg syndrome  -Well-controlled on 1 mg Klonopin 0.5-1 tablets nightly as needed.  -Med contract and UDS updated today.  -PDMP/OARRS reviewed.  -Refill sent to pharmacy.  -Follow-up in 3 months.    Orders:    clonazePAM (KLONOPIN) 1 MG tablet; Take 0.5-1 tablets by mouth nightly as needed for Anxiety (Insomnia) for up to 180 days. Max Daily Amount: 1 mg    Other emphysema (HCC)   -Uncomplicated  -Continues to smoke  -Declines plan to quit  -Follow-up annually, or sooner as needed         Chronic renal impairment, stage 3a (HCC)   -Declines blood work today  -Encouraged hydration  -Avoid NSAIDs  -Follow-up annually         Prediabetes   Chronic, at goal (stable), lifestyle modifications recommended  -Continue limiting sweets in diet  -Increase hydration, increase physical activity  -Declines blood work  -Follow-up annually  Orders:    POCT microalbumin    Long term use of drug       Orders:    POCT Rapid Drug Screen       Return in about 3 months (around 5/18/2025) for Controlled Medications, Fasting Labs.     Subjective   The following acute and/or

## 2025-02-18 NOTE — PATIENT INSTRUCTIONS
To avoid our call center, call our office number [phone], and listen to the options.  After listening to all English options, select the behavioral health option (option 1).  This should get you to our front office.    You may receive a survey regarding the care you received during your visit.  Your input is valuable to us.  We encourage you to complete and return your survey. We hope you will choose us in the future for your healthcare needs.        Learning About Being Active as an Older Adult  Why is being active important as you get older?     Being active is one of the best things you can do for your health. And it's never too late to start. Being active--or getting active, if you aren't already--has definite benefits. It can:  Give you more energy,  Keep your mind sharp.  Improve balance to reduce your risk of falls.  Help you manage chronic illness with fewer medicines.  No matter how old you are, how fit you are, or what health problems you have, there is a form of activity that will work for you. And the more physical activity you can do, the better your overall health will be.  What kinds of activity can help you stay healthy?  Being more active will make your daily activities easier. Physical activity includes planned exercise and things you do in daily life. There are four types of activity:  Aerobic.  Doing aerobic activity makes your heart and lungs strong.  Includes walking, dancing, and gardening.  Aim for at least 2½ hours spread throughout the week.  It improves your energy and can help you sleep better.  Muscle-strengthening.  This type of activity can help maintain muscle and strengthen bones.  Includes climbing stairs, using resistance bands, and lifting or carrying heavy loads.  Aim for at least twice a week.  It can help protect the knees and other joints.  Stretching.  Stretching gives you better range of motion in joints and muscles.  Includes upper arm stretches, calf stretches, and

## 2025-04-10 RX ORDER — CELECOXIB 200 MG/1
200 CAPSULE ORAL DAILY
Qty: 90 CAPSULE | Refills: 3 | Status: SHIPPED | OUTPATIENT
Start: 2025-04-10

## 2025-04-10 RX ORDER — ATORVASTATIN CALCIUM 40 MG/1
40 TABLET, FILM COATED ORAL DAILY
Qty: 90 TABLET | Refills: 3 | Status: SHIPPED | OUTPATIENT
Start: 2025-04-10

## 2025-05-27 ENCOUNTER — OFFICE VISIT (OUTPATIENT)
Dept: FAMILY MEDICINE CLINIC | Age: 85
End: 2025-05-27
Payer: MEDICARE

## 2025-05-27 VITALS
HEIGHT: 62 IN | OXYGEN SATURATION: 96 % | WEIGHT: 147 LBS | SYSTOLIC BLOOD PRESSURE: 128 MMHG | HEART RATE: 68 BPM | BODY MASS INDEX: 27.05 KG/M2 | DIASTOLIC BLOOD PRESSURE: 78 MMHG

## 2025-05-27 DIAGNOSIS — F13.20 BENZODIAZEPINE DEPENDENCE (HCC): Primary | ICD-10-CM

## 2025-05-27 DIAGNOSIS — F41.9 ANXIETY: ICD-10-CM

## 2025-05-27 DIAGNOSIS — G25.81 RESTLESS LEG SYNDROME: ICD-10-CM

## 2025-05-27 LAB
ALCOHOL URINE: NORMAL
AMPHETAMINE SCREEN URINE: NORMAL
BARBITURATE SCREEN URINE: NORMAL
BENZODIAZEPINE SCREEN, URINE: NORMAL
BUPRENORPHINE URINE: NORMAL
COCAINE METABOLITE SCREEN URINE: NORMAL
FENTANYL SCREEN, URINE: NORMAL
GABAPENTIN SCREEN, URINE: NORMAL
MDMA, URINE: NORMAL
METHADONE SCREEN, URINE: NORMAL
METHAMPHETAMINE, URINE: NORMAL
OPIATE SCREEN URINE: NORMAL
OXYCODONE SCREEN URINE: NORMAL
PHENCYCLIDINE SCREEN URINE: NORMAL
PROPOXYPHENE SCREEN, URINE: NORMAL
SYNTHETIC CANNABINOIDS(K2) SCREEN, URINE: NORMAL
THC SCREEN, URINE: NORMAL
TRAMADOL SCREEN URINE: NORMAL
TRICYCLIC ANTIDEPRESSANTS, UR: NORMAL

## 2025-05-27 PROCEDURE — 1090F PRES/ABSN URINE INCON ASSESS: CPT

## 2025-05-27 PROCEDURE — G8399 PT W/DXA RESULTS DOCUMENT: HCPCS

## 2025-05-27 PROCEDURE — 1159F MED LIST DOCD IN RCRD: CPT

## 2025-05-27 PROCEDURE — G8419 CALC BMI OUT NRM PARAM NOF/U: HCPCS

## 2025-05-27 PROCEDURE — 1123F ACP DISCUSS/DSCN MKR DOCD: CPT

## 2025-05-27 PROCEDURE — 4004F PT TOBACCO SCREEN RCVD TLK: CPT

## 2025-05-27 PROCEDURE — 99215 OFFICE O/P EST HI 40 MIN: CPT

## 2025-05-27 PROCEDURE — 80305 DRUG TEST PRSMV DIR OPT OBS: CPT

## 2025-05-27 PROCEDURE — G8427 DOCREV CUR MEDS BY ELIG CLIN: HCPCS

## 2025-05-27 RX ORDER — CLONAZEPAM 1 MG/1
.5-1 TABLET ORAL NIGHTLY PRN
Qty: 90 TABLET | Refills: 1 | Status: CANCELLED | OUTPATIENT
Start: 2025-05-27 | End: 2025-11-23

## 2025-05-27 NOTE — ASSESSMENT & PLAN NOTE
Chronic, not at goal (unstable), Has been using Clonazepam chronically over several years for anxiety and RLS, which is highly inappropriate. I stated to the patient that I do not have an expertise in chronic BZD management and therefore, referral to Latanya Alegre of psychiatry has been ordered for further management.

## 2025-07-17 ENCOUNTER — OFFICE VISIT (OUTPATIENT)
Dept: PSYCHIATRY | Age: 85
End: 2025-07-17
Payer: MEDICARE

## 2025-07-17 VITALS
SYSTOLIC BLOOD PRESSURE: 124 MMHG | DIASTOLIC BLOOD PRESSURE: 76 MMHG | HEIGHT: 62 IN | BODY MASS INDEX: 26.87 KG/M2 | WEIGHT: 146 LBS

## 2025-07-17 DIAGNOSIS — G25.81 RESTLESS LEG SYNDROME: ICD-10-CM

## 2025-07-17 DIAGNOSIS — F41.9 ANXIETY: ICD-10-CM

## 2025-07-17 PROCEDURE — 1159F MED LIST DOCD IN RCRD: CPT | Performed by: NURSE PRACTITIONER

## 2025-07-17 PROCEDURE — 1090F PRES/ABSN URINE INCON ASSESS: CPT | Performed by: NURSE PRACTITIONER

## 2025-07-17 PROCEDURE — G8419 CALC BMI OUT NRM PARAM NOF/U: HCPCS | Performed by: NURSE PRACTITIONER

## 2025-07-17 PROCEDURE — 4004F PT TOBACCO SCREEN RCVD TLK: CPT | Performed by: NURSE PRACTITIONER

## 2025-07-17 PROCEDURE — 1123F ACP DISCUSS/DSCN MKR DOCD: CPT | Performed by: NURSE PRACTITIONER

## 2025-07-17 PROCEDURE — G8427 DOCREV CUR MEDS BY ELIG CLIN: HCPCS | Performed by: NURSE PRACTITIONER

## 2025-07-17 PROCEDURE — G8399 PT W/DXA RESULTS DOCUMENT: HCPCS | Performed by: NURSE PRACTITIONER

## 2025-07-17 PROCEDURE — 99203 OFFICE O/P NEW LOW 30 MIN: CPT | Performed by: NURSE PRACTITIONER

## 2025-07-17 RX ORDER — CLONAZEPAM 1 MG/1
.5-1 TABLET ORAL NIGHTLY PRN
Qty: 30 TABLET | Refills: 3 | Status: SHIPPED | OUTPATIENT
Start: 2025-07-17 | End: 2026-07-12

## 2025-07-17 ASSESSMENT — ANXIETY QUESTIONNAIRES
7. FEELING AFRAID AS IF SOMETHING AWFUL MIGHT HAPPEN: NOT AT ALL
6. BECOMING EASILY ANNOYED OR IRRITABLE: NOT AT ALL
GAD7 TOTAL SCORE: 0
4. TROUBLE RELAXING: NOT AT ALL
2. NOT BEING ABLE TO STOP OR CONTROL WORRYING: NOT AT ALL
3. WORRYING TOO MUCH ABOUT DIFFERENT THINGS: NOT AT ALL
5. BEING SO RESTLESS THAT IT IS HARD TO SIT STILL: NOT AT ALL
1. FEELING NERVOUS, ANXIOUS, OR ON EDGE: NOT AT ALL

## 2025-07-17 ASSESSMENT — PATIENT HEALTH QUESTIONNAIRE - PHQ9
9. THOUGHTS THAT YOU WOULD BE BETTER OFF DEAD, OR OF HURTING YOURSELF: NOT AT ALL
5. POOR APPETITE OR OVEREATING: NOT AT ALL
2. FEELING DOWN, DEPRESSED OR HOPELESS: NOT AT ALL
4. FEELING TIRED OR HAVING LITTLE ENERGY: NOT AT ALL
7. TROUBLE CONCENTRATING ON THINGS, SUCH AS READING THE NEWSPAPER OR WATCHING TELEVISION: NOT AT ALL
10. IF YOU CHECKED OFF ANY PROBLEMS, HOW DIFFICULT HAVE THESE PROBLEMS MADE IT FOR YOU TO DO YOUR WORK, TAKE CARE OF THINGS AT HOME, OR GET ALONG WITH OTHER PEOPLE: NOT DIFFICULT AT ALL
SUM OF ALL RESPONSES TO PHQ QUESTIONS 1-9: 0
SUM OF ALL RESPONSES TO PHQ QUESTIONS 1-9: 0
8. MOVING OR SPEAKING SO SLOWLY THAT OTHER PEOPLE COULD HAVE NOTICED. OR THE OPPOSITE, BEING SO FIGETY OR RESTLESS THAT YOU HAVE BEEN MOVING AROUND A LOT MORE THAN USUAL: NOT AT ALL
SUM OF ALL RESPONSES TO PHQ QUESTIONS 1-9: 0
1. LITTLE INTEREST OR PLEASURE IN DOING THINGS: NOT AT ALL
3. TROUBLE FALLING OR STAYING ASLEEP: NOT AT ALL
6. FEELING BAD ABOUT YOURSELF - OR THAT YOU ARE A FAILURE OR HAVE LET YOURSELF OR YOUR FAMILY DOWN: NOT AT ALL
SUM OF ALL RESPONSES TO PHQ QUESTIONS 1-9: 0

## 2025-07-17 NOTE — PROGRESS NOTES
PSYCHIATRY INITIAL EVALUATION/DIAGNOSTIC ASSESSMENT    Keely Paulino  1940 07/17/25    CC:   Chief Complaint   Patient presents with    New Patient     Referred by Chris Castaneda MD.      Diagnosis Orders   1. Restless leg syndrome  clonazePAM (KLONOPIN) 1 MG tablet      2. Anxiety  clonazePAM (KLONOPIN) 1 MG tablet        Assessment & Plan:     Psychiatric Assessment  The patient presents with a diagnosis of anxiety and insomnia. She has been on a very low-dose benzodiazepine regimen for over 20 years for management of these conditions. Previous attempts at weaning off the medication, as well as trials of alternative pharmacologic treatments, have been unsuccessful.    The patient remains compliant with her medication regimen, as confirmed by OARRS, and consistently attends scheduled appointments. Given the longstanding benefit and stability on her current regimen, we will continue the benzodiazepine therapy while monitoring closely for efficacy and safety.    2. Pharmacology    - Continue Clonazepam 0.5 mg qhs for management of anxiety, insomnia.   - Labs reviewed.    3.   Psychotherapy  - Practice complementary health approaches such as: self-management strategies, relaxation techniques, yoga, and physical exercise as tolerated.    4.   Substance Abuse  - None    5.   Medical   - Dr. Castaneda    6.   RTC:  3 months or earlier if your symptoms fail to improve or go to nearest ER if having active SI/HI. Evaluated medications and assessed for side effects and effectiveness. Assessed patient's educational needs including reviewing plan of care, medications,diagnosis, treatment options, and prognosis. I reviewed the plan of care with the patient and the patient consented to the treatment interventions. Patient acknowledged, verbalized understanding, and agreed with plan of care.  ___________________________________________________________________  HPI: Patient is a 84 y.o. female with h/o Anxiety who p/t office to

## (undated) DEVICE — SUTURE VCRL SZ 1 L18IN ABSRB UD L36MM CT-1 1/2 CIR J841D

## (undated) DEVICE — PAD,NON-ADHERENT,3X8,STERILE,LF,1/PK: Brand: MEDLINE

## (undated) DEVICE — DUAL CUT SAGITTAL BLADE

## (undated) DEVICE — SUTURE ABSORBABLE MONOFILAMENT 1-0 OS8 14 IN STRATAFIX SPRL SXPD2B202

## (undated) DEVICE — Z DISCONTINUED USE 2716304 SUTURE STRATAFIX SPRL SZ 3-0 L12IN ABSRB UD FS-1 L24MM 3/8

## (undated) DEVICE — TOTAL BASIC PK

## (undated) DEVICE — COVER LT HNDL BLU PLAS

## (undated) DEVICE — BANDAGE COMPR W4INXL15FT BGE E SGL LAYERED CLP CLSR

## (undated) DEVICE — COVER,TABLE,77X90,STERILE: Brand: MEDLINE

## (undated) DEVICE — GOWN,REINF,POLY,AURORA,XLNG/XXL,STRL: Brand: MEDLINE

## (undated) DEVICE — GLOVE,SURG,SENSICARE SLT,LF,PF,8.5: Brand: MEDLINE

## (undated) DEVICE — KIT OR ROOM TURNOVER W/STRAP

## (undated) DEVICE — BANDAGE COMPR W6INXL10YD ST M E WHITE/BEIGE

## (undated) DEVICE — GLOVE,SURG,SENSICARE SLT,LF,PF,8: Brand: MEDLINE

## (undated) DEVICE — MATTRESS MAXI AIR TRNSF SGL PT USE DCS 37 45 48 52 75

## (undated) DEVICE — SYRINGE MED 30ML STD CLR PLAS LUERLOCK TIP N CTRL DISP

## (undated) DEVICE — 3M™ STERI-DRAPE™ U-DRAPE 1015: Brand: STERI-DRAPE™

## (undated) DEVICE — BLADE SAW W12.5XL70MM THK1MM RECIP DBL SIDE OFFSET

## (undated) DEVICE — Device: Brand: POWER-FLO®

## (undated) DEVICE — COVER,MAYO STAND,STERILE: Brand: MEDLINE

## (undated) DEVICE — SOLUTION IV IRRIG POUR BRL 0.9% SODIUM CHL 2F7124

## (undated) DEVICE — STERILE PATIENT PROTECTIVE PAD FOR IMP® KNEE POSITIONERS & COHESIVE WRAP (10 / CASE): Brand: DE MAYO KNEE POSITIONER®

## (undated) DEVICE — SPECIMEN COLLECTION 4-PORT MANIFOLD: Brand: NEPTUNE 2

## (undated) DEVICE — SYRINGE IRRIG 60ML SFT PLIABLE BLB EZ TO GRP 1 HND USE W/

## (undated) DEVICE — BASIC SINGLE BASIN 1-LF: Brand: MEDLINE INDUSTRIES, INC.

## (undated) DEVICE — SHEET,DRAPE,53X77,STERILE: Brand: MEDLINE

## (undated) DEVICE — SUTURE STRATAFIX SPRL SZ 2 0 L14IN ABSRB UD MH L36MM 1 2 CIR SXMD2B401

## (undated) DEVICE — CHLORAPREP 26ML ORANGE

## (undated) DEVICE — ELECTRODE PT RET AD L9FT HI MOIST COND ADH HYDRGEL CORDED

## (undated) DEVICE — SOLUTION PREP POVIDONE IOD FOR SKIN MUCOUS MEM PRIOR TO

## (undated) DEVICE — ZIMMER® STERILE DISPOSABLE TOURNIQUET CUFF WITH PLC, DUAL PORT, SINGLE BLADDER, 34 IN. (86 CM)

## (undated) DEVICE — COTTON UNDERCAST PADDING,CRIMPED FINISH: Brand: WEBRIL

## (undated) DEVICE — HANDPIECE SET WITH HIGH FLOW TIP AND SUCTION TUBE: Brand: INTERPULSE

## (undated) DEVICE — DECANTER BAG 9": Brand: MEDLINE INDUSTRIES, INC.

## (undated) DEVICE — KIT STEREOTAXIC POD DISPOSABLE IASSIST

## (undated) DEVICE — STERILE TOTAL KNEE DRAPE PACK: Brand: CARDINAL HEALTH

## (undated) DEVICE — GLOVE SURG SZ 85 L12IN FNGR THK13MIL WHT ISOLEX POLYISOPRENE

## (undated) DEVICE — 3M™ COBAN™ NL STERILE NON-LATEX SELF-ADHERENT WRAP, 2083S, 3 IN X 5 YD (7,5 CM X 4,5 M), 24 ROLLS/CASE: Brand: 3M™ COBAN™

## (undated) DEVICE — SOLUTION IV 1000ML 0.9% SOD CHL FOR IRRIG PLAS CONT

## (undated) DEVICE — HYPODERMIC SAFETY NEEDLE: Brand: MAGELLAN

## (undated) DEVICE — SYSTEM SKIN CLSR 22CM DERMBND PRINEO